# Patient Record
Sex: FEMALE | Race: WHITE | NOT HISPANIC OR LATINO | URBAN - METROPOLITAN AREA
[De-identification: names, ages, dates, MRNs, and addresses within clinical notes are randomized per-mention and may not be internally consistent; named-entity substitution may affect disease eponyms.]

---

## 2023-11-22 PROCEDURE — 96374 THER/PROPH/DIAG INJ IV PUSH: CPT

## 2023-11-22 PROCEDURE — 99285 EMERGENCY DEPT VISIT HI MDM: CPT | Performed by: EMERGENCY MEDICINE

## 2023-11-23 ENCOUNTER — HOSPITAL ENCOUNTER (INPATIENT)
Facility: HOSPITAL | Age: 78
LOS: 9 days | Discharge: HOME | DRG: 054 | End: 2023-12-02
Attending: EMERGENCY MEDICINE | Admitting: INTERNAL MEDICINE
Payer: COMMERCIAL

## 2023-11-23 ENCOUNTER — APPOINTMENT (OUTPATIENT)
Dept: RADIOLOGY | Facility: HOSPITAL | Age: 78
DRG: 054 | End: 2023-11-23
Payer: COMMERCIAL

## 2023-11-23 DIAGNOSIS — J42 CHRONIC BRONCHITIS, UNSPECIFIED CHRONIC BRONCHITIS TYPE (MULTI): ICD-10-CM

## 2023-11-23 DIAGNOSIS — J44.9 CHRONIC OBSTRUCTIVE PULMONARY DISEASE, UNSPECIFIED COPD TYPE (MULTI): ICD-10-CM

## 2023-11-23 DIAGNOSIS — R13.12 OROPHARYNGEAL DYSPHAGIA: Primary | ICD-10-CM

## 2023-11-23 DIAGNOSIS — K21.9 GASTROESOPHAGEAL REFLUX DISEASE, UNSPECIFIED WHETHER ESOPHAGITIS PRESENT: ICD-10-CM

## 2023-11-23 DIAGNOSIS — C79.9 METASTATIC ADENOCARCINOMA (MULTI): ICD-10-CM

## 2023-11-23 DIAGNOSIS — C79.31 METASTATIC CANCER TO BRAIN (MULTI): ICD-10-CM

## 2023-11-23 LAB
ALBUMIN SERPL BCP-MCNC: 3.9 G/DL (ref 3.4–5)
ALP SERPL-CCNC: 64 U/L (ref 33–136)
ALT SERPL W P-5'-P-CCNC: 19 U/L (ref 7–45)
ANION GAP BLDV CALCULATED.4IONS-SCNC: 11 MMOL/L (ref 10–25)
ANION GAP SERPL CALC-SCNC: 14 MMOL/L (ref 10–20)
AST SERPL W P-5'-P-CCNC: 19 U/L (ref 9–39)
BASE EXCESS BLDV CALC-SCNC: 3.4 MMOL/L (ref -2–3)
BASOPHILS # BLD AUTO: 0.01 X10*3/UL (ref 0–0.1)
BASOPHILS NFR BLD AUTO: 0.2 %
BILIRUB SERPL-MCNC: 0.5 MG/DL (ref 0–1.2)
BODY TEMPERATURE: 37 DEGREES CELSIUS
BUN SERPL-MCNC: 20 MG/DL (ref 6–23)
CA-I BLDV-SCNC: 1.19 MMOL/L (ref 1.1–1.33)
CALCIUM SERPL-MCNC: 9.5 MG/DL (ref 8.6–10.6)
CHLORIDE BLDV-SCNC: 103 MMOL/L (ref 98–107)
CHLORIDE SERPL-SCNC: 105 MMOL/L (ref 98–107)
CO2 SERPL-SCNC: 30 MMOL/L (ref 21–32)
CREAT SERPL-MCNC: 0.55 MG/DL (ref 0.5–1.05)
EOSINOPHIL # BLD AUTO: 0 X10*3/UL (ref 0–0.4)
EOSINOPHIL NFR BLD AUTO: 0 %
ERYTHROCYTE [DISTWIDTH] IN BLOOD BY AUTOMATED COUNT: 11.4 % (ref 11.5–14.5)
GFR SERPL CREATININE-BSD FRML MDRD: >90 ML/MIN/1.73M*2
GLUCOSE BLDV-MCNC: 133 MG/DL (ref 74–99)
GLUCOSE SERPL-MCNC: 127 MG/DL (ref 74–99)
HCO3 BLDV-SCNC: 29.6 MMOL/L (ref 22–26)
HCT VFR BLD AUTO: 38.5 % (ref 36–46)
HCT VFR BLD EST: 41 % (ref 36–46)
HGB BLD-MCNC: 12.9 G/DL (ref 12–16)
HGB BLDV-MCNC: 13.7 G/DL (ref 12–16)
IMM GRANULOCYTES # BLD AUTO: 0.02 X10*3/UL (ref 0–0.5)
IMM GRANULOCYTES NFR BLD AUTO: 0.5 % (ref 0–0.9)
INHALED O2 CONCENTRATION: 32 %
LACTATE BLDV-SCNC: 0.8 MMOL/L (ref 0.4–2)
LYMPHOCYTES # BLD AUTO: 0.38 X10*3/UL (ref 0.8–3)
LYMPHOCYTES NFR BLD AUTO: 9.2 %
MCH RBC QN AUTO: 30.7 PG (ref 26–34)
MCHC RBC AUTO-ENTMCNC: 33.5 G/DL (ref 32–36)
MCV RBC AUTO: 92 FL (ref 80–100)
MONOCYTES # BLD AUTO: 0.04 X10*3/UL (ref 0.05–0.8)
MONOCYTES NFR BLD AUTO: 1 %
NEUTROPHILS # BLD AUTO: 3.68 X10*3/UL (ref 1.6–5.5)
NEUTROPHILS NFR BLD AUTO: 89.1 %
NRBC BLD-RTO: 0 /100 WBCS (ref 0–0)
OXYHGB MFR BLDV: 95.1 % (ref 45–75)
PCO2 BLDV: 50 MM HG (ref 41–51)
PH BLDV: 7.38 PH (ref 7.33–7.43)
PLATELET # BLD AUTO: 181 X10*3/UL (ref 150–450)
PO2 BLDV: 83 MM HG (ref 35–45)
POTASSIUM BLDV-SCNC: 3.7 MMOL/L (ref 3.5–5.3)
POTASSIUM SERPL-SCNC: 3.7 MMOL/L (ref 3.5–5.3)
PROT SERPL-MCNC: 6.7 G/DL (ref 6.4–8.2)
RBC # BLD AUTO: 4.2 X10*6/UL (ref 4–5.2)
SAO2 % BLDV: 98 % (ref 45–75)
SODIUM BLDV-SCNC: 140 MMOL/L (ref 136–145)
SODIUM SERPL-SCNC: 145 MMOL/L (ref 136–145)
WBC # BLD AUTO: 4.1 X10*3/UL (ref 4.4–11.3)

## 2023-11-23 PROCEDURE — 72158 MRI LUMBAR SPINE W/O & W/DYE: CPT

## 2023-11-23 PROCEDURE — 72131 CT LUMBAR SPINE W/O DYE: CPT | Performed by: RADIOLOGY

## 2023-11-23 PROCEDURE — A9575 INJ GADOTERATE MEGLUMI 0.1ML: HCPCS | Performed by: EMERGENCY MEDICINE

## 2023-11-23 PROCEDURE — 2500000004 HC RX 250 GENERAL PHARMACY W/ HCPCS (ALT 636 FOR OP/ED): Mod: SE

## 2023-11-23 PROCEDURE — 72158 MRI LUMBAR SPINE W/O & W/DYE: CPT | Performed by: RADIOLOGY

## 2023-11-23 PROCEDURE — 85025 COMPLETE CBC W/AUTO DIFF WBC: CPT

## 2023-11-23 PROCEDURE — 72156 MRI NECK SPINE W/O & W/DYE: CPT

## 2023-11-23 PROCEDURE — 72125 CT NECK SPINE W/O DYE: CPT

## 2023-11-23 PROCEDURE — 99222 1ST HOSP IP/OBS MODERATE 55: CPT

## 2023-11-23 PROCEDURE — 72128 CT CHEST SPINE W/O DYE: CPT | Performed by: RADIOLOGY

## 2023-11-23 PROCEDURE — 82947 ASSAY GLUCOSE BLOOD QUANT: CPT

## 2023-11-23 PROCEDURE — 71260 CT THORAX DX C+: CPT

## 2023-11-23 PROCEDURE — 71260 CT THORAX DX C+: CPT | Performed by: STUDENT IN AN ORGANIZED HEALTH CARE EDUCATION/TRAINING PROGRAM

## 2023-11-23 PROCEDURE — 72141 MRI NECK SPINE W/O DYE: CPT

## 2023-11-23 PROCEDURE — 2550000001 HC RX 255 CONTRASTS: Performed by: EMERGENCY MEDICINE

## 2023-11-23 PROCEDURE — 1170000001 HC PRIVATE ONCOLOGY ROOM DAILY

## 2023-11-23 PROCEDURE — 36415 COLL VENOUS BLD VENIPUNCTURE: CPT

## 2023-11-23 PROCEDURE — 70553 MRI BRAIN STEM W/O & W/DYE: CPT

## 2023-11-23 PROCEDURE — 72125 CT NECK SPINE W/O DYE: CPT | Performed by: RADIOLOGY

## 2023-11-23 PROCEDURE — 72148 MRI LUMBAR SPINE W/O DYE: CPT

## 2023-11-23 PROCEDURE — 72156 MRI NECK SPINE W/O & W/DYE: CPT | Performed by: RADIOLOGY

## 2023-11-23 PROCEDURE — 72157 MRI CHEST SPINE W/O & W/DYE: CPT

## 2023-11-23 PROCEDURE — 72146 MRI CHEST SPINE W/O DYE: CPT

## 2023-11-23 PROCEDURE — 70551 MRI BRAIN STEM W/O DYE: CPT | Mod: 52

## 2023-11-23 PROCEDURE — 74177 CT ABD & PELVIS W/CONTRAST: CPT | Performed by: STUDENT IN AN ORGANIZED HEALTH CARE EDUCATION/TRAINING PROGRAM

## 2023-11-23 PROCEDURE — 72131 CT LUMBAR SPINE W/O DYE: CPT | Mod: RSC

## 2023-11-23 PROCEDURE — 2500000001 HC RX 250 WO HCPCS SELF ADMINISTERED DRUGS (ALT 637 FOR MEDICARE OP)

## 2023-11-23 PROCEDURE — 2550000001 HC RX 255 CONTRASTS: Mod: SE | Performed by: EMERGENCY MEDICINE

## 2023-11-23 PROCEDURE — 72157 MRI CHEST SPINE W/O & W/DYE: CPT | Performed by: RADIOLOGY

## 2023-11-23 PROCEDURE — 72128 CT CHEST SPINE W/O DYE: CPT | Mod: RSC

## 2023-11-23 PROCEDURE — 2500000004 HC RX 250 GENERAL PHARMACY W/ HCPCS (ALT 636 FOR OP/ED)

## 2023-11-23 RX ORDER — LORAZEPAM 1 MG/1
1 TABLET ORAL DAILY PRN
Status: DISCONTINUED | OUTPATIENT
Start: 2023-11-23 | End: 2023-12-02 | Stop reason: HOSPADM

## 2023-11-23 RX ORDER — ENOXAPARIN SODIUM 100 MG/ML
40 INJECTION SUBCUTANEOUS EVERY 24 HOURS
Status: DISCONTINUED | OUTPATIENT
Start: 2023-11-23 | End: 2023-11-28

## 2023-11-23 RX ORDER — PANTOPRAZOLE SODIUM 40 MG/1
40 TABLET, DELAYED RELEASE ORAL EVERY 12 HOURS
Status: DISCONTINUED | OUTPATIENT
Start: 2023-11-23 | End: 2023-11-24

## 2023-11-23 RX ORDER — GABAPENTIN 300 MG/1
300 CAPSULE ORAL 3 TIMES DAILY
Status: ON HOLD | COMMUNITY
End: 2023-11-23 | Stop reason: ALTCHOICE

## 2023-11-23 RX ORDER — LEVOTHYROXINE SODIUM 137 UG/1
137 TABLET ORAL
COMMUNITY
Start: 2023-11-08

## 2023-11-23 RX ORDER — TIZANIDINE 4 MG/1
4 TABLET ORAL EVERY 6 HOURS PRN
Status: DISCONTINUED | OUTPATIENT
Start: 2023-11-23 | End: 2023-11-23

## 2023-11-23 RX ORDER — DOCUSATE SODIUM 100 MG/1
100 CAPSULE, LIQUID FILLED ORAL AS NEEDED
COMMUNITY
Start: 2021-11-30

## 2023-11-23 RX ORDER — AMLODIPINE BESYLATE 5 MG/1
5 TABLET ORAL DAILY
COMMUNITY
Start: 2021-07-14

## 2023-11-23 RX ORDER — NAPROXEN SODIUM 220 MG/1
81 TABLET, FILM COATED ORAL DAILY
COMMUNITY
Start: 2021-11-30

## 2023-11-23 RX ORDER — OXYBUTYNIN CHLORIDE 5 MG/1
5 TABLET ORAL 2 TIMES DAILY
Status: DISCONTINUED | OUTPATIENT
Start: 2023-11-23 | End: 2023-12-02 | Stop reason: HOSPADM

## 2023-11-23 RX ORDER — AMLODIPINE BESYLATE 5 MG/1
5 TABLET ORAL DAILY
Status: DISCONTINUED | OUTPATIENT
Start: 2023-11-23 | End: 2023-11-27

## 2023-11-23 RX ORDER — LORAZEPAM 2 MG/ML
0.5 INJECTION INTRAMUSCULAR DAILY PRN
Status: DISCONTINUED | OUTPATIENT
Start: 2023-11-23 | End: 2023-11-29

## 2023-11-23 RX ORDER — ALBUTEROL SULFATE 0.83 MG/ML
2.5 SOLUTION RESPIRATORY (INHALATION) EVERY 6 HOURS PRN
Status: DISCONTINUED | OUTPATIENT
Start: 2023-11-23 | End: 2023-12-02 | Stop reason: HOSPADM

## 2023-11-23 RX ORDER — FESOTERODINE FUMARATE 4 MG/1
4 TABLET, FILM COATED, EXTENDED RELEASE ORAL DAILY
COMMUNITY
Start: 2020-09-09

## 2023-11-23 RX ORDER — LEVOTHYROXINE SODIUM 137 UG/1
137 TABLET ORAL
Status: DISCONTINUED | OUTPATIENT
Start: 2023-11-24 | End: 2023-11-27

## 2023-11-23 RX ORDER — ERGOCALCIFEROL (VITAMIN D2) 50 MCG
50 CAPSULE ORAL DAILY
Status: ON HOLD | COMMUNITY
Start: 2022-12-23 | End: 2023-11-23 | Stop reason: ALTCHOICE

## 2023-11-23 RX ORDER — LOSARTAN POTASSIUM 50 MG/1
50 TABLET ORAL DAILY
Status: DISCONTINUED | OUTPATIENT
Start: 2023-11-23 | End: 2023-11-27

## 2023-11-23 RX ORDER — HYDROCODONE BITARTRATE AND ACETAMINOPHEN 10; 325 MG/1; MG/1
1 TABLET ORAL EVERY 4 HOURS PRN
COMMUNITY
Start: 2023-11-07

## 2023-11-23 RX ORDER — IPRATROPIUM BROMIDE 21 UG/1
2 SPRAY, METERED NASAL 3 TIMES DAILY PRN
Status: DISCONTINUED | OUTPATIENT
Start: 2023-11-23 | End: 2023-12-02 | Stop reason: HOSPADM

## 2023-11-23 RX ORDER — DOCUSATE SODIUM 100 MG/1
100 CAPSULE, LIQUID FILLED ORAL AS NEEDED
Status: DISCONTINUED | OUTPATIENT
Start: 2023-11-23 | End: 2023-12-02 | Stop reason: HOSPADM

## 2023-11-23 RX ORDER — CYCLOBENZAPRINE HCL 10 MG
10 TABLET ORAL 3 TIMES DAILY PRN
Status: DISCONTINUED | OUTPATIENT
Start: 2023-11-23 | End: 2023-12-02 | Stop reason: HOSPADM

## 2023-11-23 RX ORDER — TIZANIDINE 4 MG/1
4 TABLET ORAL EVERY 6 HOURS PRN
Status: ON HOLD | COMMUNITY
Start: 2021-07-20 | End: 2023-11-23 | Stop reason: ALTCHOICE

## 2023-11-23 RX ORDER — LORAZEPAM 2 MG/ML
1 INJECTION INTRAMUSCULAR ONCE
Status: COMPLETED | OUTPATIENT
Start: 2023-11-23 | End: 2023-11-23

## 2023-11-23 RX ORDER — LEVETIRACETAM 5 MG/ML
500 INJECTION INTRAVASCULAR EVERY 12 HOURS
Status: DISCONTINUED | OUTPATIENT
Start: 2023-11-23 | End: 2023-12-01

## 2023-11-23 RX ORDER — ALBUTEROL SULFATE 90 UG/1
2 AEROSOL, METERED RESPIRATORY (INHALATION) EVERY 6 HOURS PRN
COMMUNITY

## 2023-11-23 RX ORDER — DEXAMETHASONE 4 MG/1
4 TABLET ORAL EVERY 6 HOURS
Status: DISCONTINUED | OUTPATIENT
Start: 2023-11-23 | End: 2023-11-23

## 2023-11-23 RX ORDER — GABAPENTIN 300 MG/1
300 CAPSULE ORAL 3 TIMES DAILY
Status: DISCONTINUED | OUTPATIENT
Start: 2023-11-23 | End: 2023-11-26

## 2023-11-23 RX ORDER — PANTOPRAZOLE SODIUM 40 MG/1
40 TABLET, DELAYED RELEASE ORAL EVERY 12 HOURS PRN
COMMUNITY
Start: 2022-12-23

## 2023-11-23 RX ORDER — POLYETHYLENE GLYCOL 3350 17 G/17G
17 POWDER, FOR SOLUTION ORAL DAILY PRN
Status: DISCONTINUED | OUTPATIENT
Start: 2023-11-23 | End: 2023-11-23

## 2023-11-23 RX ORDER — NAPROXEN SODIUM 220 MG/1
81 TABLET, FILM COATED ORAL DAILY
Status: DISCONTINUED | OUTPATIENT
Start: 2023-11-23 | End: 2023-12-02 | Stop reason: HOSPADM

## 2023-11-23 RX ORDER — ATORVASTATIN CALCIUM 10 MG/1
10 TABLET, FILM COATED ORAL NIGHTLY
Status: DISCONTINUED | OUTPATIENT
Start: 2023-11-23 | End: 2023-12-02 | Stop reason: HOSPADM

## 2023-11-23 RX ORDER — POLYETHYLENE GLYCOL 3350 17 G/17G
17 POWDER, FOR SOLUTION ORAL DAILY
Status: DISCONTINUED | OUTPATIENT
Start: 2023-11-23 | End: 2023-11-23

## 2023-11-23 RX ORDER — DEXAMETHASONE SODIUM PHOSPHATE 4 MG/ML
4 INJECTION, SOLUTION INTRA-ARTICULAR; INTRALESIONAL; INTRAMUSCULAR; INTRAVENOUS; SOFT TISSUE EVERY 6 HOURS
Status: DISCONTINUED | OUTPATIENT
Start: 2023-11-23 | End: 2023-12-01

## 2023-11-23 RX ORDER — LEVETIRACETAM 500 MG/1
500 TABLET ORAL 2 TIMES DAILY
Status: DISCONTINUED | OUTPATIENT
Start: 2023-11-23 | End: 2023-11-23

## 2023-11-23 RX ORDER — IPRATROPIUM BROMIDE 21 UG/1
2 SPRAY, METERED NASAL 3 TIMES DAILY PRN
COMMUNITY
Start: 2023-08-14

## 2023-11-23 RX ORDER — CYCLOBENZAPRINE HCL 10 MG
10 TABLET ORAL 3 TIMES DAILY PRN
Status: ON HOLD | COMMUNITY
End: 2023-11-23 | Stop reason: ALTCHOICE

## 2023-11-23 RX ORDER — LORAZEPAM 0.5 MG/1
2 TABLET ORAL ONCE
Status: COMPLETED | OUTPATIENT
Start: 2023-11-23 | End: 2023-11-23

## 2023-11-23 RX ORDER — ONDANSETRON HYDROCHLORIDE 8 MG/1
8 TABLET, FILM COATED ORAL EVERY 8 HOURS PRN
COMMUNITY

## 2023-11-23 RX ORDER — IRBESARTAN 150 MG/1
150 TABLET ORAL DAILY
COMMUNITY
Start: 2021-07-19

## 2023-11-23 RX ORDER — ALBUTEROL SULFATE 0.83 MG/ML
2.5 SOLUTION RESPIRATORY (INHALATION) EVERY 6 HOURS PRN
Qty: 75 ML | Refills: 11
Start: 2023-11-23 | End: 2024-11-22

## 2023-11-23 RX ORDER — HYDROCODONE BITARTRATE AND ACETAMINOPHEN 5; 325 MG/1; MG/1
2 TABLET ORAL EVERY 4 HOURS PRN
Status: DISCONTINUED | OUTPATIENT
Start: 2023-11-23 | End: 2023-12-02 | Stop reason: HOSPADM

## 2023-11-23 RX ORDER — LORAZEPAM 1 MG/1
1 TABLET ORAL DAILY PRN
COMMUNITY
Start: 2009-08-17 | End: 2024-05-05

## 2023-11-23 RX ORDER — CHOLECALCIFEROL (VITAMIN D3) 25 MCG
50 TABLET ORAL DAILY
Status: DISCONTINUED | OUTPATIENT
Start: 2023-11-23 | End: 2023-12-02 | Stop reason: HOSPADM

## 2023-11-23 RX ORDER — GADOTERATE MEGLUMINE 376.9 MG/ML
12 INJECTION INTRAVENOUS
Status: COMPLETED | OUTPATIENT
Start: 2023-11-23 | End: 2023-11-23

## 2023-11-23 RX ORDER — CHOLECALCIFEROL (VITAMIN D3) 25 MCG
2000 TABLET ORAL DAILY
COMMUNITY

## 2023-11-23 RX ORDER — ATORVASTATIN CALCIUM 10 MG/1
10 TABLET, FILM COATED ORAL DAILY
COMMUNITY
Start: 2021-07-19

## 2023-11-23 RX ADMIN — DEXAMETHASONE 4 MG: 2 TABLET ORAL at 12:21

## 2023-11-23 RX ADMIN — DEXAMETHASONE SODIUM PHOSPHATE 4 MG: 4 INJECTION, SOLUTION INTRAMUSCULAR; INTRAVENOUS at 17:20

## 2023-11-23 RX ADMIN — IOHEXOL 90 ML: 350 INJECTION, SOLUTION INTRAVENOUS at 07:58

## 2023-11-23 RX ADMIN — LEVETIRACETAM 500 MG: 5 INJECTION INTRAVENOUS at 21:54

## 2023-11-23 RX ADMIN — LORAZEPAM 1 MG: 2 INJECTION INTRAMUSCULAR; INTRAVENOUS at 03:20

## 2023-11-23 RX ADMIN — LORAZEPAM 2 MG: 0.5 TABLET ORAL at 12:53

## 2023-11-23 RX ADMIN — GADOTERATE MEGLUMINE 12 ML: 376.9 INJECTION INTRAVENOUS at 14:56

## 2023-11-23 RX ADMIN — DEXAMETHASONE SODIUM PHOSPHATE 4 MG: 4 INJECTION, SOLUTION INTRAMUSCULAR; INTRAVENOUS at 22:04

## 2023-11-23 RX ADMIN — LEVETIRACETAM 500 MG: 500 TABLET, FILM COATED ORAL at 12:21

## 2023-11-23 SDOH — SOCIAL STABILITY: SOCIAL INSECURITY: WERE YOU ABLE TO COMPLETE ALL THE BEHAVIORAL HEALTH SCREENINGS?: NO

## 2023-11-23 ASSESSMENT — COGNITIVE AND FUNCTIONAL STATUS - GENERAL
MOVING TO AND FROM BED TO CHAIR: TOTAL
DAILY ACTIVITIY SCORE: 6
PERSONAL GROOMING: TOTAL
HELP NEEDED FOR BATHING: TOTAL
DRESSING REGULAR LOWER BODY CLOTHING: TOTAL
WALKING IN HOSPITAL ROOM: TOTAL
TOILETING: TOTAL
MOVING FROM LYING ON BACK TO SITTING ON SIDE OF FLAT BED WITH BEDRAILS: TOTAL
EATING MEALS: TOTAL
PATIENT BASELINE BEDBOUND: NO
CLIMB 3 TO 5 STEPS WITH RAILING: TOTAL
TURNING FROM BACK TO SIDE WHILE IN FLAT BAD: TOTAL
DRESSING REGULAR UPPER BODY CLOTHING: TOTAL
STANDING UP FROM CHAIR USING ARMS: TOTAL
MOBILITY SCORE: 6

## 2023-11-23 ASSESSMENT — LIFESTYLE VARIABLES
HOW OFTEN DO YOU HAVE 6 OR MORE DRINKS ON ONE OCCASION: NEVER
EVER FELT BAD OR GUILTY ABOUT YOUR DRINKING: NO
HOW MANY STANDARD DRINKS CONTAINING ALCOHOL DO YOU HAVE ON A TYPICAL DAY: PATIENT DOES NOT DRINK
HAVE PEOPLE ANNOYED YOU BY CRITICIZING YOUR DRINKING: NO
AUDIT-C TOTAL SCORE: 0
HOW OFTEN DO YOU HAVE A DRINK CONTAINING ALCOHOL: NEVER
REASON UNABLE TO ASSESS: NO
SKIP TO QUESTIONS 9-10: 1
EVER HAD A DRINK FIRST THING IN THE MORNING TO STEADY YOUR NERVES TO GET RID OF A HANGOVER: NO
HAVE YOU EVER FELT YOU SHOULD CUT DOWN ON YOUR DRINKING: NO
AUDIT-C TOTAL SCORE: 0

## 2023-11-23 ASSESSMENT — ENCOUNTER SYMPTOMS
WEAKNESS: 1
ABDOMINAL DISTENTION: 0
FATIGUE: 0
NAUSEA: 0
ACTIVITY CHANGE: 1
FEVER: 0
CONFUSION: 1
CHEST TIGHTNESS: 0
SHORTNESS OF BREATH: 0
DIARRHEA: 0
COUGH: 0
FACIAL SWELLING: 0
DIFFICULTY URINATING: 0
CONFUSION: 0
ARTHRALGIAS: 0
CHILLS: 0
COLOR CHANGE: 0
ABDOMINAL PAIN: 0
HEADACHES: 0

## 2023-11-23 ASSESSMENT — PAIN SCALES - GENERAL
PAINLEVEL_OUTOF10: 0 - NO PAIN
PAINLEVEL_OUTOF10: 0 - NO PAIN
PAINLEVEL_OUTOF10: 8
PAINLEVEL_OUTOF10: 0 - NO PAIN

## 2023-11-23 ASSESSMENT — ACTIVITIES OF DAILY LIVING (ADL)
ADEQUATE_TO_COMPLETE_ADL: YES
JUDGMENT_ADEQUATE_SAFELY_COMPLETE_DAILY_ACTIVITIES: NO
PATIENT'S MEMORY ADEQUATE TO SAFELY COMPLETE DAILY ACTIVITIES?: NO
BATHING: NEEDS ASSISTANCE
TOILETING: NEEDS ASSISTANCE
HEARING - LEFT EAR: FUNCTIONAL
WALKS IN HOME: NEEDS ASSISTANCE
HEARING - RIGHT EAR: FUNCTIONAL
FEEDING YOURSELF: NEEDS ASSISTANCE
DRESSING YOURSELF: NEEDS ASSISTANCE
GROOMING: NEEDS ASSISTANCE
ADEQUATE_TO_COMPLETE_ADL: YES

## 2023-11-23 ASSESSMENT — COLUMBIA-SUICIDE SEVERITY RATING SCALE - C-SSRS
2. HAVE YOU ACTUALLY HAD ANY THOUGHTS OF KILLING YOURSELF?: NO
1. IN THE PAST MONTH, HAVE YOU WISHED YOU WERE DEAD OR WISHED YOU COULD GO TO SLEEP AND NOT WAKE UP?: NO
6. HAVE YOU EVER DONE ANYTHING, STARTED TO DO ANYTHING, OR PREPARED TO DO ANYTHING TO END YOUR LIFE?: NO

## 2023-11-23 ASSESSMENT — PAIN DESCRIPTION - LOCATION: LOCATION: BACK

## 2023-11-23 ASSESSMENT — PAIN - FUNCTIONAL ASSESSMENT
PAIN_FUNCTIONAL_ASSESSMENT: 0-10

## 2023-11-23 ASSESSMENT — PAIN DESCRIPTION - PAIN TYPE: TYPE: CHRONIC PAIN

## 2023-11-23 NOTE — PROGRESS NOTES
Patient was handed off to me from the previous team. For full history, physical, and prior ED course, please see previous provider note prior to patient handoff. This is an addendum to the record.    HPI/prior hospital course:   In brief, patient is a 78-year-old female with past medical history of lung adenocarcinoma who presented with bilateral weakness, gait difficulties and speech difficulties for 10 days.  Outside imaging concerning for multiple brain metastases with vasogenic edema.  Patient transferred for neurosurgical evaluation.  Patient handed off pending results of MRIs of brain and C/T/L-spine.  Plan at time of handoff was admission to medicine with oncology consult if no neurosurgical intervention required.    Hospital Course/MDM:  MRI brain's with confirmed evidence of metastatic disease in the brain no evidence of spinal cord metastases.  Patient unable to tolerate MRI with contrast due to anxiety.  Benito scan with contrast ordered with plan for spot doses of benzodiazepines to facilitate scan but pending at time of admission.  Case discussed with neurosurgery who recommended dexamethasone 4 mg every 6, Keppra 500 mg twice daily and medicine admission irrespective of results of MRI with contrast.  Case was discussed with medicine and patient admitted to medicine for facilitation of further workup.  Neurosurgery felt strongly that patient needed inpatient monitoring of neurological symptoms of months and needed expedited care that could only be facilitated with admission.    Disposition:  Admitted to medicine    Patient seen and discussed with Dr. Destinee Montoya MD, PhD  Emergency Medicine PGY2

## 2023-11-23 NOTE — SIGNIFICANT EVENT
Transfer from [facility].  78-year-old female with history of lung adenocarcinoma presenting with weakness and gait instability.  CT head found multiple frontal lobe masses with edema, given Dex.  Accepted by neurosurgery for ED to ED transfer for further evaluation.

## 2023-11-23 NOTE — CONSULTS
Consults    Date of Service:  11/23/2023 Attending Provider:  Jonathan Rodriguez MD     Reason for Consultation:  Valorie Montelongo is being seen today for a consult requested by Jonathan Rodriguez MD for brain mets.      Assessment/Plan   Assessment:  77 y/o F w h/o HTN HLD NSTEMI 2/2 pneumothorax (11/25/21), lung adenocarinoma s/p L VATS, RAYRAY and LLL wedge resection (11/18/21), COPD (on 2L), oral cancer s/p resection and rads, GERD, papillary thyroid cancer s/p thyroidectomy 7/2021, hypothyroidism OAB, chronic pain syndrome, anxiety p/w weakness and gait instability, CTH LF mass, 11/23 MRI neuroaxis EF 1.5x1.5cm, LF 1x1cm, and 2x2cm lesions, R parietal 1x1cm lesion    Plan:  Recommend med onc admission  Please obtain volumetric MRI brain with contrast  Recommend discussion at tumor board  Recommend dex 4q6 and keppra 500BID  Please document RCRI, prognosis   Due to cardiorespiratory status, complex medical/oncologic history, and multiple lesions <3cm recommend gamma knife and no acute neurosurgical intervention.     Plan was discussed with chief resident and attending Dr. Cleveland, Dr. Olivares. Plan not finalized until note signed by attending    Subjective   History of Present Illness:  77 y/o F w h/o HTN HLD NSTEMI 2/2 pneumothorax (11/25/21), lung adenocarinoma s/p L VATS, RAYRAY and LLL wedge resection (11/18/21), COPD (on 2L), oral cancer s/p resection and rads, GERD, papillary thyroid cancer s/p thyroidectomy 7/2021, hypothyroidism OAB, chronic pain syndrome, anxiety p/w weakness and gait instability, CTH LF mass, 11/23 MRI neuroaxis EF 1.5x1.5cm, LF 1x1cm, and 2x2cm lesions, R parietal 1x1cm lesion.    Review of Systems   Constitutional:  Negative for fever.   HENT:  Negative for facial swelling.    Eyes:  Negative for visual disturbance.   Respiratory:  Negative for cough.    Cardiovascular:  Negative for leg swelling.   Gastrointestinal:  Negative for abdominal distention.   Genitourinary:  Negative for difficulty  urinating.   Musculoskeletal:  Positive for gait problem.   Neurological:  Negative for headaches.   Psychiatric/Behavioral:  Positive for confusion.         Objective   Vitals:  Vitals:    11/23/23 0034   BP: 155/86   Pulse: 85   Resp: 20   Temp: 36.9 °C (98.5 °F)   SpO2: 98%       Exam:  Constitutional: No acute distress  Resp: breathing comfortably  Cardio: well perfused  GI: nondistended  MSK: full range of motion  Neuro: Awake, Ox3,  R D4, B/T 5, HG/IO 4, R drift, RLE 5/5 clonus  L 5/5, no clonus  Psych: appropriate  Skin: no obvious lesions    Medical History  No past medical history on file.    Surgical History  Past Surgical History:   Procedure Laterality Date    CT HEAD ANGIO W AND WO IV CONTRAST  4/12/2019    CT HEAD ANGIO W AND WO IV CONTRAST 4/12/2019 [facility]    OTHER SURGICAL HISTORY  07/21/2021    Hysterectomy    OTHER SURGICAL HISTORY  11/02/2021    Cholecystectomy    OTHER SURGICAL HISTORY  11/02/2021    Cataract surgery    OTHER SURGICAL HISTORY  12/03/2021    Lung surgery    OTHER SURGICAL HISTORY  12/09/2021    Pleural biopsy    OTHER SURGICAL HISTORY  12/09/2021    Lung wedge resection    OTHER SURGICAL HISTORY  09/28/2021    Thyroidectomy total    OTHER SURGICAL HISTORY  09/29/2021    Percutaneous endoscopic gastrostomy tube removal    OTHER SURGICAL HISTORY  09/29/2021    Percutaneous endoscopic gastrostomy tube insertion        Medications  No current outpatient medications     Diagnostic Results:    Lab Results   Component Value Date    WBC 10.5 11/30/2021    HGB 9.8 (L) 11/30/2021    HCT 30.5 (L) 11/30/2021    MCV 96 11/30/2021     11/30/2021     Lab Results   Component Value Date    CREATININE 0.79 11/30/2021    BUN 10 11/30/2021     11/30/2021    K 3.8 11/30/2021     11/30/2021    CO2 26 11/30/2021     Lab Results   Component Value Date    INR 1.1 11/26/2021    INR 1.1 11/20/2021    INR 1.0 11/19/2021    PROTIME 12.8 11/26/2021    PROTIME 13.0 11/20/2021     PROTIME 12.1 11/19/2021     Miguelito Dozier MD

## 2023-11-23 NOTE — ED TRIAGE NOTES
Presented to [facility] ED from home for new onset B/L LE weakness. CT showed 2.5 CM mass. Transferred to [facility] for neuro eval

## 2023-11-23 NOTE — PROGRESS NOTES
Pharmacy Medication History Review    Valorie Montelongo is a 78 y.o. female admitted for Metastatic adenocarcinoma (CMS/HCC). Pharmacy reviewed the patient's fsykc-hb-rbbfiofil medications and allergies for accuracy.    The list below reflects the updated PTA list. Comments regarding how patient may be taking medications differently can be found in the Admit Orders Activity  Prior to Admission Medications   Prescriptions Last Dose Informant Patient Reported? Taking?   HYDROcodone-acetaminophen (Norco)  mg tablet   Yes Yes   Sig: Take 1 tablet by mouth every 4 hours if needed for moderate pain (4 - 6).   LORazepam (Ativan) 1 mg tablet   Yes Yes   Sig: Take 1 tablet (1 mg) by mouth once daily as needed for anxiety.   albuterol 90 mcg/actuation inhaler   Yes Yes   Sig: Inhale 2 puffs every 6 hours if needed for wheezing or shortness of breath.   amLODIPine (Norvasc) 5 mg tablet not taking  Yes Yes   Sig: Take 1 tablet (5 mg) by mouth once daily.   aspirin 81 mg chewable tablet not taking  Yes Yes   Sig: Chew 1 tablet (81 mg) once daily.   atorvastatin (Lipitor) 10 mg tablet   Yes Yes   Sig: Take 1 tablet (10 mg) by mouth once daily.   cholecalciferol (Vitamin D-3) 25 MCG (1000 UT) tablet   Yes Yes   Sig: Take 2 tablets (2,000 Units) by mouth once daily.   docusate sodium (Colace) 100 mg capsule   Yes Yes   Sig: Take 1 capsule (100 mg) by mouth if needed for constipation.   fesoterodine (Toviaz) 4 mg tablet extended release 24 hr   Yes Yes   Sig: Take 1 tablet (4 mg) by mouth once daily.   ipratropium (Atrovent) 21 mcg (0.03 %) nasal spray   Yes Yes   Sig: Administer 2 sprays into each nostril 3 times a day as needed for rhinitis.   irbesartan (Avapro) 150 mg tablet   Yes Yes   Sig: Take 1 tablet (150 mg) by mouth once daily.   levothyroxine (Synthroid, Levoxyl) 137 mcg tablet   Yes Yes   Sig: Take 1 tablet (137 mcg) by mouth once daily in the morning. Take before meals.   ondansetron (Zofran) 8 mg tablet   Yes Yes    Sig: Take 1 tablet (8 mg) by mouth every 8 hours if needed for nausea or vomiting.   pantoprazole (Protonix) 40 mg EC tablet   Yes Yes   Sig: Take 1 tablet (40 mg) by mouth every 12 hours if needed (acid reflux).   vit A/vit C/vit E/zinc/copper (ICAPS AREDS ORAL)   Yes Yes   Sig: Take 1 capsule by mouth once daily.      Facility-Administered Medications: None        The list below reflects the updated allergy list. Please review each documented allergy for additional clarification and justification.  Allergies  Reviewed by Felicia Fields PharmD on 11/23/2023        Severity Reactions Comments    Fluoxetine Not Specified Insomnia     Levofloxacin Low Itching, Rash             Patient declines M2B at discharge. Pharmacy has been updated to [facility].    Sources used: Pharmacy dispense history, OARRs, patient interview (unable to arouse- called spouse, José Manuel, who had medications available), and [facility] note from 11/8    Below are additional concerns with the patient's PTA list.  -spouse mentions that pain medications have been on backorder therefore hasn't had for a while. Last dispensed 10/18 for 25 day supply  -Takes Atrovent nasal spray as needed  -Was told to stop taking ibuprofen 800 mg but still has prescription at home    Felicia Fields PharmD, [facility]  Transitions of Care Pharmacist  [facility]  Please reach out via Secure Chat for questions, or if no response call [employer] or vocera [facility]

## 2023-11-23 NOTE — ED PROVIDER NOTES
HPI   Chief Complaint   Patient presents with    Extremity Weakness       Patient is a 78-year-old female with extensive history of lung adenocarcinoma presenting as a transfer from outside hospital to be evaluated by neurosurgery.  Patient joanne Grayson presented for evaluation of weakness, gait difficulty and intermittent speech difficulties x 10 days.  Patient had imaging done at outside hospital, including a CT of the brain which demonstrates multiple masses with vasogenic edema.  Neurosurgery was consulted who recommended transfer for intervention and management.  Patient arrived hemodynamically stable.  Received IV Decadron prior to transport.  Patient has an NIH of 0 here.  Denies fever, chills, chest pain or shortness of breath, nausea, vomiting or diarrhea.  No abdominal pain.  Reports that she is not currently on treatment for lung cancer and has not been on treatment for several months.                          [place] Coma Scale Score: 14                  Patient History   History reviewed. No pertinent past medical history.  Past Surgical History:   Procedure Laterality Date    CT HEAD ANGIO W AND WO IV CONTRAST  4/12/2019    CT HEAD ANGIO W AND WO IV CONTRAST 4/12/2019 [facility]    OTHER SURGICAL HISTORY  07/21/2021    Hysterectomy    OTHER SURGICAL HISTORY  11/02/2021    Cholecystectomy    OTHER SURGICAL HISTORY  11/02/2021    Cataract surgery    OTHER SURGICAL HISTORY  12/03/2021    Lung surgery    OTHER SURGICAL HISTORY  12/09/2021    Pleural biopsy    OTHER SURGICAL HISTORY  12/09/2021    Lung wedge resection    OTHER SURGICAL HISTORY  09/28/2021    Thyroidectomy total    OTHER SURGICAL HISTORY  09/29/2021    Percutaneous endoscopic gastrostomy tube removal    OTHER SURGICAL HISTORY  09/29/2021    Percutaneous endoscopic gastrostomy tube insertion     No family history on file.  Social History     Tobacco Use    Smoking status: Not on file    Smokeless tobacco: Not on file   Substance  Use Topics    Alcohol use: Not on file    Drug use: Not on file       Physical Exam   ED Triage Vitals [11/23/23 0034]   Temp Heart Rate Resp BP   36.9 °C (98.5 °F) 85 20 155/86      SpO2 Temp Source Heart Rate Source Patient Position   98 % Oral Monitor Lying      BP Location FiO2 (%)     Left arm --       Physical Exam  Vitals and nursing note reviewed.   Constitutional:       General: She is not in acute distress.     Appearance: Normal appearance. She is not toxic-appearing.   HENT:      Head: Normocephalic.      Mouth/Throat:      Mouth: Mucous membranes are moist.   Eyes:      General: No visual field deficit.     Conjunctiva/sclera: Conjunctivae normal.      Pupils: Pupils are equal, round, and reactive to light.   Cardiovascular:      Rate and Rhythm: Normal rate and regular rhythm.      Pulses:           Radial pulses are 2+ on the right side and 2+ on the left side.   Pulmonary:      Effort: Pulmonary effort is normal. No respiratory distress.      Breath sounds: Normal breath sounds.   Abdominal:      General: Abdomen is flat.      Palpations: Abdomen is soft.      Tenderness: There is no abdominal tenderness. There is no guarding or rebound.   Musculoskeletal:      Cervical back: Normal range of motion and neck supple.      Right lower leg: No edema.      Left lower leg: No edema.   Skin:     General: Skin is warm.   Neurological:      Mental Status: She is alert and oriented to person, place, and time.      GCS: GCS eye subscore is 4. GCS verbal subscore is 5. GCS motor subscore is 6.      Cranial Nerves: Dysarthria present. No cranial nerve deficit or facial asymmetry.      Sensory: No sensory deficit.      Motor: Weakness and tremor present.      Coordination: Romberg sign negative. Finger-Nose-Finger Test normal. Impaired rapid alternating movements.   Psychiatric:         Mood and Affect: Mood normal.         ED Course & MDM   ED Course as of 11/26/23 1604   Thu Nov 23, 2023   1031 MR brain w and wo  IV contrast [RG]   1031 MR brain wo IV contrast [RG]      ED Course User Index  [RG] Gino Felipe MD         Diagnoses as of 11/26/23 1604   Metastatic adenocarcinoma (CMS/HCC)       Medical Decision Making  78-year-old female presenting to the emergency department with new onset weakness to be evaluated by neurosurgery.  On physical exam, patient is hemodynamically stable and in no acute distress.  Neurosurgery consulted.  Patient has an NIH of 0 on my evaluation.  Neurosurgery recommended additional imaging including CTs of the spine in addition to MRI of the head, CT and L-spine.  Given patient has concerning signs for new mets to the brain and possibly spinal cord, patient warrants admission to  medical oncology to discuss initiating treatment with neurosurgery following.  At time of signout, patient was pending MRI results and final disposition.  Please refer to incoming resident note for further hospital course.  Patient signed out in stable condition.        Procedure  Procedures     Eliza Fermin DO  Resident  11/26/23 1602

## 2023-11-23 NOTE — CONSULTS
Speech-Language Pathology                 Therapy Communication Note    Patient Name: Valorie Montelongo  MRN: [id number]  Today's Date: 11/23/2023     Discipline: Speech Language Pathology    Missed Visit Reason:  Pt in the ED at the time of the order. Pt in process of being transported for procedure and then to pt room.    Missed Time: Attempt    Comment: SLP will re-attempt as soon as able.

## 2023-11-23 NOTE — H&P
History Of Present Illness  Valorie Montelongo is a 79 y/o F w h/o metastatic lung cancer (s/p VATS RAYRAY and LLL resection at  11/18/21 but now follows with oncology at [facility]), h/o papillary thyroid cancer (s/p thyroidectomy 7/2021), oral cancer (s/p resection and XRT), HTN, HLD, NSTEMI 2/2 pneumothorax (11/25/21), COPD (on 2L), GERD, and anxiety who presents as a transfer from [facility] for neurosurgery evaluation as CT head at OSH showed new metastatic brain lesions. Interview conducted with patient as well as  at bed side. Per  patient first noticed a neuro cognitive decline about 10 days prior to admission. He noticed a change in her gait as well as her mixing up words as well as writing. He conducted her outpatient PCP as well as oncologist who recommended ED evaluation due to Thanksgiving holiday. Patient denies any recent falls or trauma to the head. She also denies any headaches, blurry vision, diplopia, changes to her senses, F/C, shortness of breath, chest pain, abdominal pain, N/V/D.      ED course:   T-36.9  HR- 75  RR- 30  BP-127/73  SPO2- 99% on RA     Labs: Glucose- 127, Na-145, WBC-4.1, Hgb-12.9, plat- 181     CT whole spine without IV contrast: No CT evidence of metastatic disease in the spine, degernative changes and scoliosis noted     MRI whole spine without contrast: no lesions noted within the cervical, thoracic or lumbar spine, no significant spinal canal or neural foraminal stenosis     MRI brain without IV contrast: numerus cerebral lesions scattered throughout the b/l cerebral hemispheres with significant sorrouning vasogenic edema, components of hemorrhage - however exam is somewhat limited due to non-contrast examination     MRI brain with contrast attempted however patient unable to tolerate due to anxiety. Will plan to re-attempt 11/23 PM with ativan pre-medication     Neurosurgery consulted in the ED: recommend dexamethasone 4mg q6 hours and keppra 500mg BID  and recommend gamma knife radiation and no acute surgical intervention due to complex medical / oncological history and multiple lesions <3cm     Past Medical History  metastatic lung cancer (s/p VATS RAYRAY and LLL resection at  11/18/21 but now follows with oncology at [facility]), h/o papillary thyroid cancer (s/p thyroidectomy 7/2021), oral cancer (s/p resection and XRT), HTN, HLD, NSTEMI 2/2 pneumothorax (11/25/21), COPD (on 2L), GERD, and anxiety     Surgical History  She has a past surgical history that includes Other surgical history (07/21/2021); Other surgical history (11/02/2021); Other surgical history (11/02/2021); Other surgical history (12/03/2021); Other surgical history (12/09/2021); Other surgical history (12/09/2021); Other surgical history (09/28/2021); Other surgical history (09/29/2021); Other surgical history (09/29/2021); and CT angio head w and wo IV contrast (4/12/2019).    Oncology History    No history exists.     metastatic lung cancer (s/p VATS RAYRAY and LLL resection at  11/18/21 but now follows with oncology at [facility]), h/o papillary thyroid cancer (s/p thyroidectomy 7/2021), oral cancer (s/p resection and XRT)    Patient follows with [facility], primary oncologist Dr. David Mathias -- emailed on admission about findings and for further oncologic history and records as community records via EPIC do not provide details     Social History  Former smoker, quite 6/2021   Denies ETOH or illicit drug use now or ever     Allergies  Fluoxetine and Levofloxacin    Review of Systems   Constitutional:  Positive for activity change. Negative for chills and fatigue.   HENT:  Negative for congestion.    Respiratory:  Negative for cough, chest tightness and shortness of breath.    Cardiovascular:  Negative for chest pain.   Gastrointestinal:  Negative for abdominal pain, diarrhea and nausea.   Musculoskeletal:  Negative for arthralgias.   Skin:  Negative for color change.   Neurological:  Positive for weakness.         Gait changes   Psychiatric/Behavioral:  Negative for confusion.         Physical Exam     Last Recorded Vitals  Blood pressure 154/73, pulse 64, temperature 36.9 °C (98.5 °F), temperature source Axillary, resp. rate 23, SpO2 99 %.    Relevant Results        Scheduled medications  amLODIPine, 5 mg, oral, Daily  aspirin, 81 mg, oral, Daily  atorvastatin, 10 mg, oral, Nightly  cholecalciferol, 50 mcg, oral, Daily  dexAMETHasone, 4 mg, oral, q6h  [Held by provider] enoxaparin, 40 mg, subcutaneous, q24h  gabapentin, 300 mg, oral, TID  levETIRAcetam, 500 mg, oral, BID  [START ON 11/24/2023] levothyroxine, 137 mcg, oral, Daily before breakfast  losartan, 50 mg, oral, Daily  oxybutynin, 5 mg, oral, BID  pantoprazole, 40 mg, oral, q12h  polyethylene glycol, 17 g, oral, Daily      CT chest abdomen pelvis w IV contrast    Result Date: 11/23/2023  Interpreted By:  Lance Jose,  and Jesse Lucas STUDY: CT CHEST ABDOMEN PELVIS W IV CONTRAST;  11/23/2023 8:02 am   INDICATION: Signs/Symptoms:concern for mets with new acute weakness. History of papillary thyroid cancer involving trachea status post debulking surgery 7/2021 with positive margins and biopsy confirmed adenocarcinoma of the left lung in 2021 status post left upper lobe wedge resection November 2021.   COMPARISON: PET-CT dated 11/16/2021, CT chest 11/19/2021, MRI brain 11/23/2023   ACCESSION NUMBER(S): [id number]   ORDERING CLINICIAN: DASIA GALARZA   TECHNIQUE: Contiguous axial images of the chest, abdomen, and pelvis were obtained after the intravenous administration of 90 mL Omnipaque 350 contrast. Coronal and sagittal reformatted images were reconstructed from the axial data.   FINDINGS: CT CHEST:   MEDIASTINUM AND LYMPH NODES: Newly enlarged AP window lymph node measures 1.1 cm in short axis diameter concerning for metastatic involvement. There are also right interlobar lymph nodes measuring 1.1 cm x 0.9 cm better newly increased in size from 11/19/2021  concerning for metastatic involvement. No pneumomediastinum.  There is a prominent left cervical lymph node measuring 0.7 cm in short axis diameter.   VESSELS: Normal caliber aorta without dissection. Moderate aortic atherosclerosis.   HEART: Normal size.  Mild coronary artery calcifications. No significant pericardial effusion.   LUNG, AIRWAYS, PLEURA: There is debris within the subglottic trachea and large amount of layering debris in the left mainstem bronchus extending into the left upper lobe bronchi and proximal left lower lobe bronchus. Redemonstration of advanced emphysema. Postsurgical changes of left upper and lower lobe wedge resection are again noted. There several dozen bilateral pulmonary nodules measuring up to 1.6 cm in the left upper lobe. Some of these are new from 11/16/2021 and 08/03/2021 such as a 0.5 cm nodule in the left lower lobe (axial image 166/342, series 205) and 0.4 cm nodule in the medial right lower lobe (axial image 173/342, series 205). However, most of these nodules were present on 11/16/2021 and intervally larger, such as a 1.3 cm left upper lobe nodule (axial image 83/342, series 205), 1.6 cm nodule in the left upper lobe (axial image 103/342, series 205) 1 cm and 0.7 cm nodule in the right lower lobe (axial image 172,179/342, series 205); other pre-existing larger nodules are annotated on series 205. No consolidation, pulmonary edema, pleural effusion or pneumothorax. Within the apical segment of the right upper lobe is the 0.7 cm ground-glass nodule unchanged from 11/19/2021 likely representing a low grade adenocarcinoma variant such as atypical adenomatous hyperplasia or adenocarcinoma in situ.   CHEST WALL SOFT TISSUES: No discernible abnormality.   OSSEOUS STRUCTURES: No acute osseous abnormality. No suspicious osseous lesions. There is a severe dextroconvex thoracic and levoconvex upper lumbar scoliosis.   OTHER: There is a 2.8 cm x 2.5 cm x 1.5 cm enhancing infiltrative  mass in the surgical bed of the left thyroid lobe that results in gabriella, transmural tracheal wall invasion and intraluminal extension causing mild narrowing of the trachea. There is also mass effect upon the adjacent esophagus with loss of fat plane, concerning for esophageal wall involvement.     CT ABDOMEN/PELVIS:   ABDOMINAL WALL: No significant abnormality.   LIVER: No significant parenchymal abnormality.   BILE DUCTS: Prominent intrahepatic and extrahepatic bile ducts are likely secondary to post-cholecystectomy status.   GALLBLADDER: Gallbladder is surgically absent.   PANCREAS: No significant abnormality.   SPLEEN: No significant abnormality.   ADRENALS: There is stable nodular thickening of the (left > right) adrenal glands.   KIDNEYS, URETERS, BLADDER: The left kidney is moderately atrophic with lobulated contour due to multifocal scarring. There is a 1.5 cm staghorn calculus in the left renal pelvis. Homogeneously hypodense lesions in the left kidney measuring up to 2.3 cm in the superior pole are favored to represent simple cysts.   REPRODUCTIVE ORGANS: The uterus is surgically absent. The left ovary is asymmetrically larger compared to the right which is similar to 11/16/2021.   VESSELS: There are moderate atherosclerotic changes of the abdominal aorta and its branches.   RETROPERITONEUM/LYMPH NODES: No enlarged lymph nodes. No acute retroperitoneal abnormality.   BOWEL/MESENTERY/PERITONEUM: No inflammatory bowel wall thickening or dilatation. The appendix is not definitively visualized. However, there is no pericecal fluid or stranding.   No ascites, free air, or fluid collection.     MUSCULOSKELETAL: No acute osseous abnormality. Marked S-shaped scoliotic curvature of the thoracolumbar spine. Multilevel degenerative changes of the thoracolumbar spine.       1. Interval increased soft tissue thickening in the left thyroid surgical bed (i.e., a 2.8 cm x 2.5 cm x 1.5 cm enhancing lesion with transmural  tracheal wall invasion causing mild narrowing of the tracheal lumen), probable involvement of the cervical esophageal wall, and adjacent prominent left cervical lymph node. Findings are consistent with locoregional progression of known thyroid cancer. This is most likely de-differentiated thyroid carcinoma given its intense metabolic activity on PET CT 11/16/2021. 2. Dozens of bilateral pulmonary nodules, a few which are new from 11/16/2021 (annotated on series 205); however, the majority were previously present (11/16/2021, 08/03/2021) and have increased in size (annotated on series 205). Differentiation between metastatic lung adenocarcinoma and metastatic thyroid carcinoma cannot be made on anatomic imaging alone, and both malignancies demonstrates similar intense hypermetabolic activity on previous PET CT, in which it was suggested the thyroid cancer was likely de-differentiated, making iodine scan less sensitive for detection. Therefore, a biopsy of both a pre-existing but larger pulmonary nodule and a new pulmonary nodule could be considered to evaluate for dual-source pulmonary metastases. 3. New enlargement of an aortopulmonary window lymph node and right interlobar lymph nodes suspicious for metastatic disease. 4. Probable low-grade adenocarcinoma variant in the right lung apex (0.7 cm), such as adenocarcinoma in situ or atypical adenomatous hyperplasia, stable in size from prior study. 5. Mucous debris in the trachea, left main stem bronchus and left upper and lower lobe bronchi. 6. Similar moderately atrophic left kidney with extensive scarring and interval enlargement of 1.5 cm staghorn calculus in the left renal pelvis.       I personally reviewed the images/study and I agree with the findings as stated above by resident physician, Lance Molina MD. This study was interpreted at [facility], [place], [place].   MACRO: None.   Signed by: Lance Jose 11/23/2023 10:15  AM Dictation workstation:   [id number]    CT cervical spine wo IV contrast    Result Date: 11/23/2023  Interpreted By:  Jeanine Vargas and Benza Andrew STUDY: CT CERVICAL SPINE WO IV CONTRAST; CT THORACIC SPINE WO IV CONTRAST; CT LUMBAR SPINE WO IV CONTRAST;  11/23/2023 8:02 am   INDICATION: Signs/Symptoms:concern for mets with new acute weakness.   COMPARISON: MRI cervical spine, thoracic spine, and lumbar spine dated 11/23/2023   ACCESSION NUMBER(S): [id number]; [id number]; [id number]   ORDERING CLINICIAN: DASIA GALARZA   TECHNIQUE: Axial noncontrast images of the cervical, thoracic and lumbar spine with coronal and sagittal reconstructed images.   FINDINGS: CT CERVICAL SPINE:   PREVERTEBRAL SOFT TISSUES: Within normal limits.   CRANIOCERVICAL JUNCTION: Intact.   ALIGNMENT: No traumatic malalignment or traumatic facet widening.   VERTEBRAE: No acute fracture. Vertebral body heights are maintained.   SPINAL CANAL/INTERVERTEBRAL DISCS: No high-grade spinal canal stenosis. Varying degrees of degenerative disc disease, notable for moderate disc height loss at C5-C6.   NEURAL FORAMINA: Multilevel uncovertebral joint and facet arthropathy notably contribute to moderate bilateral neural foraminal stenosis at C5-C6 and mild left neural foraminal stenosis at C4-C5.   OTHER: None.     CT THORACIC SPINE:   PARASPINAL SOFT TISSUES: No significant abnormality.   ALIGNMENT: There is marked S shaped curvature of the thoracolumbar spine. No traumatic malalignment or traumatic facet widening.   VERTEBRAE: No acute fracture. Vertebral body heights are maintained.   SPINAL CANAL/INTERVERTEBRAL DISCS: No high-grade spinal canal stenosis. Varying degrees of degenerative disc disease, most significant in the midthoracic spine.   VISUALIZED CHEST: Several pulmonary nodules are visualized. Please see concurrently obtained and separately dictated CT chest abdomen and pelvis report for further characterization.     CT LUMBAR  SPINE:   PARASPINAL SOFT TISSUES: No significant abnormality.   ALIGNMENT: There is marked S shaped curvature of the thoracolumbar spine. No traumatic malalignment or traumatic facet widening.   VERTEBRAE: No acute fracture. Vertebral body heights are maintained.   SPINAL CANAL/INTERVERTEBRAL DISCS: No high-grade spinal canal stenosis. Varying degrees of degenerative disc disease, most significant at L1-L2, L2-L3, and L3-L4.   NEURAL FORAMINA:  Varying degrees of neural foraminal stenosis, most significant at L2-L3, L3-L4, and L4-L5.   VISUALIZED ABDOMEN: Please see concurrently obtained and separately dictated CT chest abdomen and pelvis report for further characterization.       1. No CT evidence of metastatic disease in the spine. 2. Several pulmonary nodules visualized. Please see concurrently obtained and separately dictated CT chest abdomen and pelvis report for further characterization. 3. Degenerative changes and scoliosis of the spine as described above.     I personally reviewed the images/study and I agree with the findings as stated above by resident physician, Lance Molina MD. This study was interpreted at [place], [place].   MACRO: None   Signed by: Jeanine Vargas 11/23/2023 8:37 AM Dictation workstation:   [id number]    CT thoracic spine wo IV contrast    Result Date: 11/23/2023  Interpreted By:  Jeanine Vargas and Benza Andrew STUDY: CT CERVICAL SPINE WO IV CONTRAST; CT THORACIC SPINE WO IV CONTRAST; CT LUMBAR SPINE WO IV CONTRAST;  11/23/2023 8:02 am   INDICATION: Signs/Symptoms:concern for mets with new acute weakness.   COMPARISON: MRI cervical spine, thoracic spine, and lumbar spine dated 11/23/2023   ACCESSION NUMBER(S): [id number]; [id number]; [id number]   ORDERING CLINICIAN: DASIA GALARZA   TECHNIQUE: Axial noncontrast images of the cervical, thoracic and lumbar spine with coronal and sagittal reconstructed images.   FINDINGS: CT CERVICAL  SPINE:   PREVERTEBRAL SOFT TISSUES: Within normal limits.   CRANIOCERVICAL JUNCTION: Intact.   ALIGNMENT: No traumatic malalignment or traumatic facet widening.   VERTEBRAE: No acute fracture. Vertebral body heights are maintained.   SPINAL CANAL/INTERVERTEBRAL DISCS: No high-grade spinal canal stenosis. Varying degrees of degenerative disc disease, notable for moderate disc height loss at C5-C6.   NEURAL FORAMINA: Multilevel uncovertebral joint and facet arthropathy notably contribute to moderate bilateral neural foraminal stenosis at C5-C6 and mild left neural foraminal stenosis at C4-C5.   OTHER: None.     CT THORACIC SPINE:   PARASPINAL SOFT TISSUES: No significant abnormality.   ALIGNMENT: There is marked S shaped curvature of the thoracolumbar spine. No traumatic malalignment or traumatic facet widening.   VERTEBRAE: No acute fracture. Vertebral body heights are maintained.   SPINAL CANAL/INTERVERTEBRAL DISCS: No high-grade spinal canal stenosis. Varying degrees of degenerative disc disease, most significant in the midthoracic spine.   VISUALIZED CHEST: Several pulmonary nodules are visualized. Please see concurrently obtained and separately dictated CT chest abdomen and pelvis report for further characterization.     CT LUMBAR SPINE:   PARASPINAL SOFT TISSUES: No significant abnormality.   ALIGNMENT: There is marked S shaped curvature of the thoracolumbar spine. No traumatic malalignment or traumatic facet widening.   VERTEBRAE: No acute fracture. Vertebral body heights are maintained.   SPINAL CANAL/INTERVERTEBRAL DISCS: No high-grade spinal canal stenosis. Varying degrees of degenerative disc disease, most significant at L1-L2, L2-L3, and L3-L4.   NEURAL FORAMINA:  Varying degrees of neural foraminal stenosis, most significant at L2-L3, L3-L4, and L4-L5.   VISUALIZED ABDOMEN: Please see concurrently obtained and separately dictated CT chest abdomen and pelvis report for further characterization.       1. No  CT evidence of metastatic disease in the spine. 2. Several pulmonary nodules visualized. Please see concurrently obtained and separately dictated CT chest abdomen and pelvis report for further characterization. 3. Degenerative changes and scoliosis of the spine as described above.     I personally reviewed the images/study and I agree with the findings as stated above by resident physician, Lance Molina MD. This study was interpreted at [facility], [place], [place].   MACRO: None   Signed by: Jeanine Vargas 11/23/2023 8:37 AM Dictation workstation:   [id number]    CT lumbar spine wo IV contrast    Result Date: 11/23/2023  Interpreted By:  Jeanine Vargas,  and Jesse Lucas STUDY: CT CERVICAL SPINE WO IV CONTRAST; CT THORACIC SPINE WO IV CONTRAST; CT LUMBAR SPINE WO IV CONTRAST;  11/23/2023 8:02 am   INDICATION: Signs/Symptoms:concern for mets with new acute weakness.   COMPARISON: MRI cervical spine, thoracic spine, and lumbar spine dated 11/23/2023   ACCESSION NUMBER(S): [id number]; [id number]; [id number]   ORDERING CLINICIAN: DASIA GALARZA   TECHNIQUE: Axial noncontrast images of the cervical, thoracic and lumbar spine with coronal and sagittal reconstructed images.   FINDINGS: CT CERVICAL SPINE:   PREVERTEBRAL SOFT TISSUES: Within normal limits.   CRANIOCERVICAL JUNCTION: Intact.   ALIGNMENT: No traumatic malalignment or traumatic facet widening.   VERTEBRAE: No acute fracture. Vertebral body heights are maintained.   SPINAL CANAL/INTERVERTEBRAL DISCS: No high-grade spinal canal stenosis. Varying degrees of degenerative disc disease, notable for moderate disc height loss at C5-C6.   NEURAL FORAMINA: Multilevel uncovertebral joint and facet arthropathy notably contribute to moderate bilateral neural foraminal stenosis at C5-C6 and mild left neural foraminal stenosis at C4-C5.   OTHER: None.     CT THORACIC SPINE:   PARASPINAL SOFT TISSUES: No significant abnormality.    ALIGNMENT: There is marked S shaped curvature of the thoracolumbar spine. No traumatic malalignment or traumatic facet widening.   VERTEBRAE: No acute fracture. Vertebral body heights are maintained.   SPINAL CANAL/INTERVERTEBRAL DISCS: No high-grade spinal canal stenosis. Varying degrees of degenerative disc disease, most significant in the midthoracic spine.   VISUALIZED CHEST: Several pulmonary nodules are visualized. Please see concurrently obtained and separately dictated CT chest abdomen and pelvis report for further characterization.     CT LUMBAR SPINE:   PARASPINAL SOFT TISSUES: No significant abnormality.   ALIGNMENT: There is marked S shaped curvature of the thoracolumbar spine. No traumatic malalignment or traumatic facet widening.   VERTEBRAE: No acute fracture. Vertebral body heights are maintained.   SPINAL CANAL/INTERVERTEBRAL DISCS: No high-grade spinal canal stenosis. Varying degrees of degenerative disc disease, most significant at L1-L2, L2-L3, and L3-L4.   NEURAL FORAMINA:  Varying degrees of neural foraminal stenosis, most significant at L2-L3, L3-L4, and L4-L5.   VISUALIZED ABDOMEN: Please see concurrently obtained and separately dictated CT chest abdomen and pelvis report for further characterization.       1. No CT evidence of metastatic disease in the spine. 2. Several pulmonary nodules visualized. Please see concurrently obtained and separately dictated CT chest abdomen and pelvis report for further characterization. 3. Degenerative changes and scoliosis of the spine as described above.     I personally reviewed the images/study and I agree with the findings as stated above by resident physician, Lance Molina MD. This study was interpreted at [place], [place].   MACRO: None   Signed by: Jeanine Vargas 11/23/2023 8:37 AM Dictation workstation:   [id number]    MR lumbar spine wo IV contrast    Result Date: 11/23/2023  Interpreted By:  Franco  Gregorio Sun STUDY: MR BRAIN WO IV CONTRAST; MR CERVICAL SPINE WO IV CONTRAST; MR LUMBAR SPINE WO IV CONTRAST; MR THORACIC SPINE WO IV CONTRAST;  11/23/2023 5:14 am   INDICATION: Signs/Symptoms:new mass with neuro changes; Signs/Symptoms:concern for mets.   Per EMR: History of lung adenocarcinoma presented as transfer from outside hospital to be evaluated by Neurosurgery. Originally presented for weakness and gait difficulty CT brain and outside hospital demonstrated mass with vasogenic edema.   COMPARISON: CT angio chest, 11/19/2021, PET-CT 11/16/2021, CT head 04/12/2019   ACCESSION NUMBER(S): [id number]; [id number]; [id number]; [id number]   ORDERING CLINICIAN: DASIA GALARZA   TECHNIQUE: Axial T2, FLAIR, DWI, gradient echo T2 and sagittal and coronal T1 weighted images of brain were acquired. No IV contrast was given. Sagittal T1, T2, STIR, axial T1 and T2 weighted images of the cervical, thoracic, and lumbar spine were acquired.   FINDINGS: Please note this examination is moderately limited due to motion artifact.   MRI BRAIN:   CSF Spaces: There is sulcal effacement and mild effacement of the anterior horn left lateral ventricle from significant vasogenic edema surrounding dominant mass lesion within the d left frontal lobe   Parenchyma: Punctate areas of diffusion restriction are noted at the gray-white junction within the right occipital lobe, in multiple areas adjacent to the central sulcus which most likely represent restricting punctate metastatic lesions. There is a rim of diffusion restriction surrounding the dominant lesion within the left frontal lobe as well. Lastly, there is a punctate focus of diffusion restriction within the right basal ganglia which also corresponds to most likely metastatic lesion. No diffusion restriction to suggest infarction.   Examination of metastatic lesions is limited without IV contrast material. However, numerous mass lesions are noted throughout the  bilateral cerebral parenchyma which appear isointense to gray matter on FLAIR imaging, and hyperintense on T2 weighted imaging. A few of the largest lesions are as follows: *Left frontal lobe dominant mass (FLAIR image 19) measures up to 2.2 x 2.2 cm with significant surrounding vasogenic edema, sulcal effacement, and foci of hemosiderin deposition likely indicating hemorrhagic component. *Superior right parietal lobe (FLAIR image 8) measures up to 1.5 x 1.1 cm with significant surrounding vasogenic edema. *Right occipital lobe (FLAIR image 16) measuring up to 1.1 x 1.0 cm *Superior left frontal lobe (FLAIR image 5) with significant surrounding vasogenic edema, sulcal effacement measuring up to 1.1 x 0.9 cm with foci of hemosiderin deposition likely indicating hemorrhagic component.   Multiple additional punctate sites of disease are noted but are limited for evaluation without contrast material. There is no evidence of herniation.   Paranasal Sinuses and Mastoids: Visualized paranasal sinuses and mastoid air cells are unremarkable.     CERVICAL SPINE:   Alignment: Within normal limits.   Vertebrae/Intervertebral Discs: The vertebral bodies demonstrate expected height. The marrow signal is within normal limits. No marrow replacing lesions are identified. There is diffuse loss of normal disc signal.   Cord: Normal in caliber and signal.   C1-C2: The cervicomedullary junction appears unremarkable. There is no central canal stenosis.   C2-C3: There is no posterior disc contour abnormality. There is mild left neural foraminal stenosis. No spinal canal stenosis.   C3-C4: There is no posterior disc contour abnormality. There is mild left neural foraminal stenosis. No spinal canal stenosis.   C4-C5: There is no posterior disc contour abnormality. There is mild left neural foraminal stenosis. No spinal canal stenosis.   C5-C6: There is mild posterior disc bulge which flattens the ventral thecal sac without significant  spinal canal stenosis. No significant neural foraminal stenosis.   C6-C7: No significant spinal canal or neural foraminal stenosis.   C7-T1: No significant spinal canal or neural foraminal stenosis.   The prevertebral and posterior paraspinous soft tissues are within normal limits.     THORACIC SPINE:   Alignment: Significant scoliotic curve is noted. Otherwise no malalignment is within normal limits.   Vertebrae/Intervertebral Discs: The vertebral body heights are intact.  Marrow signal is within normal limits. No marrow replacing lesions are identified. There is mild loss of disc height and there is diffuse loss of normal disc signal.   Cord: Normal in signal.   T1-2: There is no spinal canal or neural foraminal stenosis.   T2-3: There is no spinal canal or neural foraminal stenosis.   T3-4: There is no spinal canal or neural foraminal stenosis.   T4-5: There no spinal canal or neural foraminal stenosis.   T5-6: There is no spinal canal or neural foraminal stenosis.   T6-7: There is no spinal canal or neural foraminal stenosis.   T7-8: There is no spinal canal or neural foraminal stenosis.   T8-9: There is no spinal canal or neural foraminal stenosis.   T9-10: There is no spinal canal or neural foraminal stenosis.   T10-11: There is no spinal canal or neural foraminal stenosis.   T11-12: There is no spinal canal or neural foraminal stenosis.   Paraspinous Soft Tissues: Multiple, incompletely characterized pulmonary nodules are seen     LUMBAR SPINE:   Alignment: Levo scoliotic curve of the lumbar spine is noted.   Vertebrae/Intervertebral Discs: The vertebral bodies demonstrate expected height.The marrow signal is within normal limits. There is diffuse loss of normal intervertebral disc signal.   Conus: The conus terminates at T12.   T12-L1: Evaluation of the neural foramina are significantly limited due to scoliotic curvature. However, there is mild right neural foraminal stenosis without significant spinal canal  or left neural foraminal stenosis.   L1-2:  There is mild left neural foraminal stenosis without significant spinal canal or right neural foraminal stenosis.   L2-3: Mild posterior disc bulge abuts the ventral thecal sac without significant spinal canal stenosis. However, there is mild left neural foraminal stenosis without significant right neural foraminal stenosis.   L3-4: Posterior projecting disc bulge abuts the ventral thecal sac without significant spinal canal stenosis. There is mild ligamentum flavum hypertrophy and facet arthropathy at this level contributing to mild right and moderate left neural foraminal stenosis.   L4-5: Posterior disc bulge abuts the ventral thecal sac. There is ligamentum flavum hypertrophy at this level with facet arthropathy resulting in mild spinal canal stenosis. Additionally, there is moderate to severe neural foraminal stenosis on the left without significant neural foraminal stenosis on the right.   L5-S1: Diffuse posterior disc bulge flattens the ventral thecal sac along with ligamentum flavum hypertrophy and facet arthropathy contribute to mild spinal canal stenosis. There is mild neural foraminal stenosis on the left without significant neural foraminal stenosis on the right. There is no significant central canal or neural foraminal stenosis.   Paraspinous muscle atrophy detected.       MR BRAIN: 1. Numerous cerebral lesions scattered throughout the bilateral cerebral hemispheres with significant surrounding vasogenic edema, components of hemorrhage, and components of diffusion restriction consistent with metastatic disease. However, evaluation is somewhat limited due to noncontrast examination. Recommend repeat examination with contrast for further characterization. 2. No evidence of diffusion restriction to suggest infarction.     MRI CERVICAL, THORACIC, AND LUMBAR SPINE: 1. No marrow replacing lesions are noted within the cervical, thoracic, or lumbar spine. 2. Scoliotic  curve within the thoracolumbar spine results in multilevel discogenic degenerative change most significant at L4-L5 where degenerative change contributes to mild-to-moderate spinal canal stenosis and moderate to severe left neural foramina stenosis. Additional sites of degenerative change are described above in detail. 3. No significant spinal canal or neural foraminal stenosis noted within the cervical or thoracic spine. If persistent concern of metastatic disease to the spine, contrast-enhanced examination is advised 4. Numerous pulmonary nodules not well interrogated on this examination. Correlation any known outside imaging advised   I personally reviewed the images/study and I agree with the findings as stated above by resident physician, Dr. Derick Moon. The study was interpreted at [facility] in [facility].   Signed by: Dino Gamez 11/23/2023 6:25 AM Dictation workstation:   [id number]    MR thoracic spine wo IV contrast    Result Date: 11/23/2023  Interpreted By:  Dino Gamez and Liller Gregory STUDY: MR BRAIN WO IV CONTRAST; MR CERVICAL SPINE WO IV CONTRAST; MR LUMBAR SPINE WO IV CONTRAST; MR THORACIC SPINE WO IV CONTRAST;  11/23/2023 5:14 am   INDICATION: Signs/Symptoms:new mass with neuro changes; Signs/Symptoms:concern for mets.   Per EMR: History of lung adenocarcinoma presented as transfer from outside hospital to be evaluated by Neurosurgery. Originally presented for weakness and gait difficulty CT brain and outside hospital demonstrated mass with vasogenic edema.   COMPARISON: CT angio chest, 11/19/2021, PET-CT 11/16/2021, CT head 04/12/2019   ACCESSION NUMBER(S): [id number]; [id number]; [id number]; [id number]   ORDERING CLINICIAN: DASIA GALARZA   TECHNIQUE: Axial T2, FLAIR, DWI, gradient echo T2 and sagittal and coronal T1 weighted images of brain were acquired. No IV contrast was given. Sagittal T1, T2, STIR, axial T1 and T2 weighted  images of the cervical, thoracic, and lumbar spine were acquired.   FINDINGS: Please note this examination is moderately limited due to motion artifact.   MRI BRAIN:   CSF Spaces: There is sulcal effacement and mild effacement of the anterior horn left lateral ventricle from significant vasogenic edema surrounding dominant mass lesion within the d left frontal lobe   Parenchyma: Punctate areas of diffusion restriction are noted at the gray-white junction within the right occipital lobe, in multiple areas adjacent to the central sulcus which most likely represent restricting punctate metastatic lesions. There is a rim of diffusion restriction surrounding the dominant lesion within the left frontal lobe as well. Lastly, there is a punctate focus of diffusion restriction within the right basal ganglia which also corresponds to most likely metastatic lesion. No diffusion restriction to suggest infarction.   Examination of metastatic lesions is limited without IV contrast material. However, numerous mass lesions are noted throughout the bilateral cerebral parenchyma which appear isointense to gray matter on FLAIR imaging, and hyperintense on T2 weighted imaging. A few of the largest lesions are as follows: *Left frontal lobe dominant mass (FLAIR image 19) measures up to 2.2 x 2.2 cm with significant surrounding vasogenic edema, sulcal effacement, and foci of hemosiderin deposition likely indicating hemorrhagic component. *Superior right parietal lobe (FLAIR image 8) measures up to 1.5 x 1.1 cm with significant surrounding vasogenic edema. *Right occipital lobe (FLAIR image 16) measuring up to 1.1 x 1.0 cm *Superior left frontal lobe (FLAIR image 5) with significant surrounding vasogenic edema, sulcal effacement measuring up to 1.1 x 0.9 cm with foci of hemosiderin deposition likely indicating hemorrhagic component.   Multiple additional punctate sites of disease are noted but are limited for evaluation without contrast  material. There is no evidence of herniation.   Paranasal Sinuses and Mastoids: Visualized paranasal sinuses and mastoid air cells are unremarkable.     CERVICAL SPINE:   Alignment: Within normal limits.   Vertebrae/Intervertebral Discs: The vertebral bodies demonstrate expected height. The marrow signal is within normal limits. No marrow replacing lesions are identified. There is diffuse loss of normal disc signal.   Cord: Normal in caliber and signal.   C1-C2: The cervicomedullary junction appears unremarkable. There is no central canal stenosis.   C2-C3: There is no posterior disc contour abnormality. There is mild left neural foraminal stenosis. No spinal canal stenosis.   C3-C4: There is no posterior disc contour abnormality. There is mild left neural foraminal stenosis. No spinal canal stenosis.   C4-C5: There is no posterior disc contour abnormality. There is mild left neural foraminal stenosis. No spinal canal stenosis.   C5-C6: There is mild posterior disc bulge which flattens the ventral thecal sac without significant spinal canal stenosis. No significant neural foraminal stenosis.   C6-C7: No significant spinal canal or neural foraminal stenosis.   C7-T1: No significant spinal canal or neural foraminal stenosis.   The prevertebral and posterior paraspinous soft tissues are within normal limits.     THORACIC SPINE:   Alignment: Significant scoliotic curve is noted. Otherwise no malalignment is within normal limits.   Vertebrae/Intervertebral Discs: The vertebral body heights are intact.  Marrow signal is within normal limits. No marrow replacing lesions are identified. There is mild loss of disc height and there is diffuse loss of normal disc signal.   Cord: Normal in signal.   T1-2: There is no spinal canal or neural foraminal stenosis.   T2-3: There is no spinal canal or neural foraminal stenosis.   T3-4: There is no spinal canal or neural foraminal stenosis.   T4-5: There no spinal canal or neural  foraminal stenosis.   T5-6: There is no spinal canal or neural foraminal stenosis.   T6-7: There is no spinal canal or neural foraminal stenosis.   T7-8: There is no spinal canal or neural foraminal stenosis.   T8-9: There is no spinal canal or neural foraminal stenosis.   T9-10: There is no spinal canal or neural foraminal stenosis.   T10-11: There is no spinal canal or neural foraminal stenosis.   T11-12: There is no spinal canal or neural foraminal stenosis.   Paraspinous Soft Tissues: Multiple, incompletely characterized pulmonary nodules are seen     LUMBAR SPINE:   Alignment: Levo scoliotic curve of the lumbar spine is noted.   Vertebrae/Intervertebral Discs: The vertebral bodies demonstrate expected height.The marrow signal is within normal limits. There is diffuse loss of normal intervertebral disc signal.   Conus: The conus terminates at T12.   T12-L1: Evaluation of the neural foramina are significantly limited due to scoliotic curvature. However, there is mild right neural foraminal stenosis without significant spinal canal or left neural foraminal stenosis.   L1-2:  There is mild left neural foraminal stenosis without significant spinal canal or right neural foraminal stenosis.   L2-3: Mild posterior disc bulge abuts the ventral thecal sac without significant spinal canal stenosis. However, there is mild left neural foraminal stenosis without significant right neural foraminal stenosis.   L3-4: Posterior projecting disc bulge abuts the ventral thecal sac without significant spinal canal stenosis. There is mild ligamentum flavum hypertrophy and facet arthropathy at this level contributing to mild right and moderate left neural foraminal stenosis.   L4-5: Posterior disc bulge abuts the ventral thecal sac. There is ligamentum flavum hypertrophy at this level with facet arthropathy resulting in mild spinal canal stenosis. Additionally, there is moderate to severe neural foraminal stenosis on the left without  significant neural foraminal stenosis on the right.   L5-S1: Diffuse posterior disc bulge flattens the ventral thecal sac along with ligamentum flavum hypertrophy and facet arthropathy contribute to mild spinal canal stenosis. There is mild neural foraminal stenosis on the left without significant neural foraminal stenosis on the right. There is no significant central canal or neural foraminal stenosis.   Paraspinous muscle atrophy detected.       MR BRAIN: 1. Numerous cerebral lesions scattered throughout the bilateral cerebral hemispheres with significant surrounding vasogenic edema, components of hemorrhage, and components of diffusion restriction consistent with metastatic disease. However, evaluation is somewhat limited due to noncontrast examination. Recommend repeat examination with contrast for further characterization. 2. No evidence of diffusion restriction to suggest infarction.     MRI CERVICAL, THORACIC, AND LUMBAR SPINE: 1. No marrow replacing lesions are noted within the cervical, thoracic, or lumbar spine. 2. Scoliotic curve within the thoracolumbar spine results in multilevel discogenic degenerative change most significant at L4-L5 where degenerative change contributes to mild-to-moderate spinal canal stenosis and moderate to severe left neural foramina stenosis. Additional sites of degenerative change are described above in detail. 3. No significant spinal canal or neural foraminal stenosis noted within the cervical or thoracic spine. If persistent concern of metastatic disease to the spine, contrast-enhanced examination is advised 4. Numerous pulmonary nodules not well interrogated on this examination. Correlation any known outside imaging advised   I personally reviewed the images/study and I agree with the findings as stated above by resident physician, Dr. Derick Moon. The study was interpreted at [facility] in [facility].   Signed by: Dino Gamez  11/23/2023 6:25 AM Dictation workstation:   [id number]    MR cervical spine wo IV contrast    Result Date: 11/23/2023  Interpreted By:  Dino Gamez and Liller Gregory STUDY: MR BRAIN WO IV CONTRAST; MR CERVICAL SPINE WO IV CONTRAST; MR LUMBAR SPINE WO IV CONTRAST; MR THORACIC SPINE WO IV CONTRAST;  11/23/2023 5:14 am   INDICATION: Signs/Symptoms:new mass with neuro changes; Signs/Symptoms:concern for mets.   Per EMR: History of lung adenocarcinoma presented as transfer from outside hospital to be evaluated by Neurosurgery. Originally presented for weakness and gait difficulty CT brain and outside hospital demonstrated mass with vasogenic edema.   COMPARISON: CT angio chest, 11/19/2021, PET-CT 11/16/2021, CT head 04/12/2019   ACCESSION NUMBER(S): [id number]; [id number]; [id number]; [id number]   ORDERING CLINICIAN: DASIA GALARZA   TECHNIQUE: Axial T2, FLAIR, DWI, gradient echo T2 and sagittal and coronal T1 weighted images of brain were acquired. No IV contrast was given. Sagittal T1, T2, STIR, axial T1 and T2 weighted images of the cervical, thoracic, and lumbar spine were acquired.   FINDINGS: Please note this examination is moderately limited due to motion artifact.   MRI BRAIN:   CSF Spaces: There is sulcal effacement and mild effacement of the anterior horn left lateral ventricle from significant vasogenic edema surrounding dominant mass lesion within the d left frontal lobe   Parenchyma: Punctate areas of diffusion restriction are noted at the gray-white junction within the right occipital lobe, in multiple areas adjacent to the central sulcus which most likely represent restricting punctate metastatic lesions. There is a rim of diffusion restriction surrounding the dominant lesion within the left frontal lobe as well. Lastly, there is a punctate focus of diffusion restriction within the right basal ganglia which also corresponds to most likely metastatic lesion. No diffusion restriction to  suggest infarction.   Examination of metastatic lesions is limited without IV contrast material. However, numerous mass lesions are noted throughout the bilateral cerebral parenchyma which appear isointense to gray matter on FLAIR imaging, and hyperintense on T2 weighted imaging. A few of the largest lesions are as follows: *Left frontal lobe dominant mass (FLAIR image 19) measures up to 2.2 x 2.2 cm with significant surrounding vasogenic edema, sulcal effacement, and foci of hemosiderin deposition likely indicating hemorrhagic component. *Superior right parietal lobe (FLAIR image 8) measures up to 1.5 x 1.1 cm with significant surrounding vasogenic edema. *Right occipital lobe (FLAIR image 16) measuring up to 1.1 x 1.0 cm *Superior left frontal lobe (FLAIR image 5) with significant surrounding vasogenic edema, sulcal effacement measuring up to 1.1 x 0.9 cm with foci of hemosiderin deposition likely indicating hemorrhagic component.   Multiple additional punctate sites of disease are noted but are limited for evaluation without contrast material. There is no evidence of herniation.   Paranasal Sinuses and Mastoids: Visualized paranasal sinuses and mastoid air cells are unremarkable.     CERVICAL SPINE:   Alignment: Within normal limits.   Vertebrae/Intervertebral Discs: The vertebral bodies demonstrate expected height. The marrow signal is within normal limits. No marrow replacing lesions are identified. There is diffuse loss of normal disc signal.   Cord: Normal in caliber and signal.   C1-C2: The cervicomedullary junction appears unremarkable. There is no central canal stenosis.   C2-C3: There is no posterior disc contour abnormality. There is mild left neural foraminal stenosis. No spinal canal stenosis.   C3-C4: There is no posterior disc contour abnormality. There is mild left neural foraminal stenosis. No spinal canal stenosis.   C4-C5: There is no posterior disc contour abnormality. There is mild left neural  foraminal stenosis. No spinal canal stenosis.   C5-C6: There is mild posterior disc bulge which flattens the ventral thecal sac without significant spinal canal stenosis. No significant neural foraminal stenosis.   C6-C7: No significant spinal canal or neural foraminal stenosis.   C7-T1: No significant spinal canal or neural foraminal stenosis.   The prevertebral and posterior paraspinous soft tissues are within normal limits.     THORACIC SPINE:   Alignment: Significant scoliotic curve is noted. Otherwise no malalignment is within normal limits.   Vertebrae/Intervertebral Discs: The vertebral body heights are intact.  Marrow signal is within normal limits. No marrow replacing lesions are identified. There is mild loss of disc height and there is diffuse loss of normal disc signal.   Cord: Normal in signal.   T1-2: There is no spinal canal or neural foraminal stenosis.   T2-3: There is no spinal canal or neural foraminal stenosis.   T3-4: There is no spinal canal or neural foraminal stenosis.   T4-5: There no spinal canal or neural foraminal stenosis.   T5-6: There is no spinal canal or neural foraminal stenosis.   T6-7: There is no spinal canal or neural foraminal stenosis.   T7-8: There is no spinal canal or neural foraminal stenosis.   T8-9: There is no spinal canal or neural foraminal stenosis.   T9-10: There is no spinal canal or neural foraminal stenosis.   T10-11: There is no spinal canal or neural foraminal stenosis.   T11-12: There is no spinal canal or neural foraminal stenosis.   Paraspinous Soft Tissues: Multiple, incompletely characterized pulmonary nodules are seen     LUMBAR SPINE:   Alignment: Levo scoliotic curve of the lumbar spine is noted.   Vertebrae/Intervertebral Discs: The vertebral bodies demonstrate expected height.The marrow signal is within normal limits. There is diffuse loss of normal intervertebral disc signal.   Conus: The conus terminates at T12.   T12-L1: Evaluation of the neural  foramina are significantly limited due to scoliotic curvature. However, there is mild right neural foraminal stenosis without significant spinal canal or left neural foraminal stenosis.   L1-2:  There is mild left neural foraminal stenosis without significant spinal canal or right neural foraminal stenosis.   L2-3: Mild posterior disc bulge abuts the ventral thecal sac without significant spinal canal stenosis. However, there is mild left neural foraminal stenosis without significant right neural foraminal stenosis.   L3-4: Posterior projecting disc bulge abuts the ventral thecal sac without significant spinal canal stenosis. There is mild ligamentum flavum hypertrophy and facet arthropathy at this level contributing to mild right and moderate left neural foraminal stenosis.   L4-5: Posterior disc bulge abuts the ventral thecal sac. There is ligamentum flavum hypertrophy at this level with facet arthropathy resulting in mild spinal canal stenosis. Additionally, there is moderate to severe neural foraminal stenosis on the left without significant neural foraminal stenosis on the right.   L5-S1: Diffuse posterior disc bulge flattens the ventral thecal sac along with ligamentum flavum hypertrophy and facet arthropathy contribute to mild spinal canal stenosis. There is mild neural foraminal stenosis on the left without significant neural foraminal stenosis on the right. There is no significant central canal or neural foraminal stenosis.   Paraspinous muscle atrophy detected.       MR BRAIN: 1. Numerous cerebral lesions scattered throughout the bilateral cerebral hemispheres with significant surrounding vasogenic edema, components of hemorrhage, and components of diffusion restriction consistent with metastatic disease. However, evaluation is somewhat limited due to noncontrast examination. Recommend repeat examination with contrast for further characterization. 2. No evidence of diffusion restriction to suggest  infarction.     MRI CERVICAL, THORACIC, AND LUMBAR SPINE: 1. No marrow replacing lesions are noted within the cervical, thoracic, or lumbar spine. 2. Scoliotic curve within the thoracolumbar spine results in multilevel discogenic degenerative change most significant at L4-L5 where degenerative change contributes to mild-to-moderate spinal canal stenosis and moderate to severe left neural foramina stenosis. Additional sites of degenerative change are described above in detail. 3. No significant spinal canal or neural foraminal stenosis noted within the cervical or thoracic spine. If persistent concern of metastatic disease to the spine, contrast-enhanced examination is advised 4. Numerous pulmonary nodules not well interrogated on this examination. Correlation any known outside imaging advised   I personally reviewed the images/study and I agree with the findings as stated above by resident physician, Dr. Derick Moon. The study was interpreted at [facility] in [facility].   Signed by: Dino Gamez 11/23/2023 6:25 AM Dictation workstation:   [id number]    MR brain wo IV contrast    Result Date: 11/23/2023  Interpreted By:  Dino Gamez and Liller Gregory STUDY: MR BRAIN WO IV CONTRAST; MR CERVICAL SPINE WO IV CONTRAST; MR LUMBAR SPINE WO IV CONTRAST; MR THORACIC SPINE WO IV CONTRAST;  11/23/2023 5:14 am   INDICATION: Signs/Symptoms:new mass with neuro changes; Signs/Symptoms:concern for mets.   Per EMR: History of lung adenocarcinoma presented as transfer from outside hospital to be evaluated by Neurosurgery. Originally presented for weakness and gait difficulty CT brain and outside hospital demonstrated mass with vasogenic edema.   COMPARISON: CT angio chest, 11/19/2021, PET-CT 11/16/2021, CT head 04/12/2019   ACCESSION NUMBER(S): [id number]; [id number]; [id number]; [id number]   ORDERING CLINICIAN: DASIA GALARZA   TECHNIQUE: Axial T2, FLAIR, DWI, gradient  echo T2 and sagittal and coronal T1 weighted images of brain were acquired. No IV contrast was given. Sagittal T1, T2, STIR, axial T1 and T2 weighted images of the cervical, thoracic, and lumbar spine were acquired.   FINDINGS: Please note this examination is moderately limited due to motion artifact.   MRI BRAIN:   CSF Spaces: There is sulcal effacement and mild effacement of the anterior horn left lateral ventricle from significant vasogenic edema surrounding dominant mass lesion within the d left frontal lobe   Parenchyma: Punctate areas of diffusion restriction are noted at the gray-white junction within the right occipital lobe, in multiple areas adjacent to the central sulcus which most likely represent restricting punctate metastatic lesions. There is a rim of diffusion restriction surrounding the dominant lesion within the left frontal lobe as well. Lastly, there is a punctate focus of diffusion restriction within the right basal ganglia which also corresponds to most likely metastatic lesion. No diffusion restriction to suggest infarction.   Examination of metastatic lesions is limited without IV contrast material. However, numerous mass lesions are noted throughout the bilateral cerebral parenchyma which appear isointense to gray matter on FLAIR imaging, and hyperintense on T2 weighted imaging. A few of the largest lesions are as follows: *Left frontal lobe dominant mass (FLAIR image 19) measures up to 2.2 x 2.2 cm with significant surrounding vasogenic edema, sulcal effacement, and foci of hemosiderin deposition likely indicating hemorrhagic component. *Superior right parietal lobe (FLAIR image 8) measures up to 1.5 x 1.1 cm with significant surrounding vasogenic edema. *Right occipital lobe (FLAIR image 16) measuring up to 1.1 x 1.0 cm *Superior left frontal lobe (FLAIR image 5) with significant surrounding vasogenic edema, sulcal effacement measuring up to 1.1 x 0.9 cm with foci of hemosiderin  deposition likely indicating hemorrhagic component.   Multiple additional punctate sites of disease are noted but are limited for evaluation without contrast material. There is no evidence of herniation.   Paranasal Sinuses and Mastoids: Visualized paranasal sinuses and mastoid air cells are unremarkable.     CERVICAL SPINE:   Alignment: Within normal limits.   Vertebrae/Intervertebral Discs: The vertebral bodies demonstrate expected height. The marrow signal is within normal limits. No marrow replacing lesions are identified. There is diffuse loss of normal disc signal.   Cord: Normal in caliber and signal.   C1-C2: The cervicomedullary junction appears unremarkable. There is no central canal stenosis.   C2-C3: There is no posterior disc contour abnormality. There is mild left neural foraminal stenosis. No spinal canal stenosis.   C3-C4: There is no posterior disc contour abnormality. There is mild left neural foraminal stenosis. No spinal canal stenosis.   C4-C5: There is no posterior disc contour abnormality. There is mild left neural foraminal stenosis. No spinal canal stenosis.   C5-C6: There is mild posterior disc bulge which flattens the ventral thecal sac without significant spinal canal stenosis. No significant neural foraminal stenosis.   C6-C7: No significant spinal canal or neural foraminal stenosis.   C7-T1: No significant spinal canal or neural foraminal stenosis.   The prevertebral and posterior paraspinous soft tissues are within normal limits.     THORACIC SPINE:   Alignment: Significant scoliotic curve is noted. Otherwise no malalignment is within normal limits.   Vertebrae/Intervertebral Discs: The vertebral body heights are intact.  Marrow signal is within normal limits. No marrow replacing lesions are identified. There is mild loss of disc height and there is diffuse loss of normal disc signal.   Cord: Normal in signal.   T1-2: There is no spinal canal or neural foraminal stenosis.   T2-3: There  is no spinal canal or neural foraminal stenosis.   T3-4: There is no spinal canal or neural foraminal stenosis.   T4-5: There no spinal canal or neural foraminal stenosis.   T5-6: There is no spinal canal or neural foraminal stenosis.   T6-7: There is no spinal canal or neural foraminal stenosis.   T7-8: There is no spinal canal or neural foraminal stenosis.   T8-9: There is no spinal canal or neural foraminal stenosis.   T9-10: There is no spinal canal or neural foraminal stenosis.   T10-11: There is no spinal canal or neural foraminal stenosis.   T11-12: There is no spinal canal or neural foraminal stenosis.   Paraspinous Soft Tissues: Multiple, incompletely characterized pulmonary nodules are seen     LUMBAR SPINE:   Alignment: Levo scoliotic curve of the lumbar spine is noted.   Vertebrae/Intervertebral Discs: The vertebral bodies demonstrate expected height.The marrow signal is within normal limits. There is diffuse loss of normal intervertebral disc signal.   Conus: The conus terminates at T12.   T12-L1: Evaluation of the neural foramina are significantly limited due to scoliotic curvature. However, there is mild right neural foraminal stenosis without significant spinal canal or left neural foraminal stenosis.   L1-2:  There is mild left neural foraminal stenosis without significant spinal canal or right neural foraminal stenosis.   L2-3: Mild posterior disc bulge abuts the ventral thecal sac without significant spinal canal stenosis. However, there is mild left neural foraminal stenosis without significant right neural foraminal stenosis.   L3-4: Posterior projecting disc bulge abuts the ventral thecal sac without significant spinal canal stenosis. There is mild ligamentum flavum hypertrophy and facet arthropathy at this level contributing to mild right and moderate left neural foraminal stenosis.   L4-5: Posterior disc bulge abuts the ventral thecal sac. There is ligamentum flavum hypertrophy at this level  with facet arthropathy resulting in mild spinal canal stenosis. Additionally, there is moderate to severe neural foraminal stenosis on the left without significant neural foraminal stenosis on the right.   L5-S1: Diffuse posterior disc bulge flattens the ventral thecal sac along with ligamentum flavum hypertrophy and facet arthropathy contribute to mild spinal canal stenosis. There is mild neural foraminal stenosis on the left without significant neural foraminal stenosis on the right. There is no significant central canal or neural foraminal stenosis.   Paraspinous muscle atrophy detected.       MR BRAIN: 1. Numerous cerebral lesions scattered throughout the bilateral cerebral hemispheres with significant surrounding vasogenic edema, components of hemorrhage, and components of diffusion restriction consistent with metastatic disease. However, evaluation is somewhat limited due to noncontrast examination. Recommend repeat examination with contrast for further characterization. 2. No evidence of diffusion restriction to suggest infarction.     MRI CERVICAL, THORACIC, AND LUMBAR SPINE: 1. No marrow replacing lesions are noted within the cervical, thoracic, or lumbar spine. 2. Scoliotic curve within the thoracolumbar spine results in multilevel discogenic degenerative change most significant at L4-L5 where degenerative change contributes to mild-to-moderate spinal canal stenosis and moderate to severe left neural foramina stenosis. Additional sites of degenerative change are described above in detail. 3. No significant spinal canal or neural foraminal stenosis noted within the cervical or thoracic spine. If persistent concern of metastatic disease to the spine, contrast-enhanced examination is advised 4. Numerous pulmonary nodules not well interrogated on this examination. Correlation any known outside imaging advised   I personally reviewed the images/study and I agree with the findings as stated above by resident  physician, Dr. Derick Moon. The study was interpreted at [facility] in [facility].   Signed by: Dino Gamez 11/23/2023 6:25 AM Dictation workstation:   [id number]     Continuous medications     PRN medications  PRN medications: alteplase, cyclobenzaprine, docusate sodium, HYDROcodone-acetaminophen, ipratropium, LORazepam, oxygen, tiZANidine       Assessment/Plan   Principal Problem:    Metastatic adenocarcinoma (CMS/HCC)      Valorie Montelongo is a 79 y/o F w h/o metastatic lung cancer (s/p VATS RAYRAY and LLL resection at  11/18/21 but now follows with oncology at [facility]), h/o papillary thyroid cancer (s/p thyroidectomy 7/2021), oral cancer (s/p resection and XRT), HTN, HLD, NSTEMI (11/25/21), COPD (on 2L), GERD, and anxiety who presents as a transfer from [facility] for neurosurgery evaluation as CT head at OSH showed new metastatic brain lesions. Patient has been experiencing b/l LE weakness, gait changes and speech difficulties that started 10 days prior to admission and have progressively gotten worse. MRI brain without contrast (11/23) showed multiple brain metastases with surrounding vasogenic edema. MRI whole spine (11/23) negative for spinal metastasis. Neurosurgery consulted and following and rec no acute surgical intervention and favor gamma knife radiation. Also rec starting dexamethasone 4mg q6 hours and keppra 500mg BID as well as MRI brain with contrast. MRI brain with contrast ordered and pending, however, delayed as patient was unable to tolerate initially and will need to be pre-medicated. SLP consulted on admission, recs pending. Plan rad onc consult AM of 11/24. Patient admitted for further management.     # New brain metastases   # B/l weakness   - Patient with h/o lung cancer and dev progressive b/l LE weakness, speech and gait changes. OSH CT head imaging concerning for brain mets, patient transferred to [facility] for further  evaluation   - CT whole spine without contrast (11/23): No CT evidence of metastatic disease in the spine, degernative changes and scoliosis noted   - MRI whole spine without contrast (11/23): no lesions noted within the cervical, thoracic or lumbar spine, no significant spinal canal or neural foraminal stenosis   - MRI brain without contrast (11/23): numerus cerebral lesions scattered throughout the b/l cerebral hemispheres with significant sorrouning vasogenic edema, components of hemorrhage - however exam is somewhat limited due to non-contrast examination   - NSGY consulted and rec no acute surgical intervention and favor gamma knife radiation, also rec starting dexamethasone 4mg q6 hours and keppra 500mg BID, and obtaining MRI brain with contrast   - MRI brain with contrast ordered and pending (11/23), patient initially unable to tolerate due to anxiety so will re-attempt with ativan premedication   - start dexamethasone 4mg q6 hours per neurosurgery recommendations   - start Keppra 500mg BID per neurosurgery recommendations   - Q4 neuro checks   - SLP consulted on admission I/s/o new brain mets and occasional reported dysphagia, recs pending   - PT/OT consulted, recs pending   - Plan rad onc consult AM of 11/24     # Lung cancer   - follows with Dr. David Mathias at [facility] - emailed about admission and above radiographic findings 11/23   - s/p VATs RARYAY and LLL resection at  on 11/18/2021   - oncological history limited as community records have limited oncologic information - per patient and , has never received chemotherapy   - CT chest with contrast (11/23): increased soft tissue thickening in the left thyoid surgical bed with probable involvement of the left cervical lymph node, dozens of bilateral pulmonary nodules of which a few are new from previous CT (11/2021), new enlargement of aortopulmonary window lymph nose and right interlobar lymph nodes suspicious for metastatic disease     # H/o papillary  thyroid cancer   # Acquired hypothyroidism   - s/p s/p thyroidectomy 7/2021  - TSH level on admission pending   - Continue with home levothyroxine 137 mcg daily before breakfast     # HTN  - continue with home amlodipine 5mg daily, home Irbesartan substituted for losartan 50mg daily while inpatient     # CAD with H /o NSTEMI  - NSTEMI 11/25/2021   - continue with home atorvastatin 10mg nightly and daily ASA    # COPD  - former smoker, baseline oxygen continuous 2-3L at home   - continue with home Ipratropium 21mcg 2 sprays TID PRN shortness of breath   - continue with home albuterol 90mcg q6 hours PRN wheezing     # Chronic back pain   - CT spine and MRI without contrast (11/23): without evidence of spinal metastasis   - continue with home gabapentin 300mg TID, Norco 5-325mg q4 hours PRN moderate pain, flexeril 10mg TID     # Overreactive bladder   - Home Toviaz substituted for oxybutynin 5mg BID while inpatient     # Anxiety   - continue with home ativan PRN anxiety     # DVT prophy / misc  - hold prophy Lovenox with MRI brain without contrast suspicious components of hemorrhage   - PPI support with dexamethasone     #Dispo   - FULL CODE, confirmed on admission   - Primary oncologist: Dr. Mathias - notified of admission 11/23   - DC pending MRI brain with contrast, NSGY recs, SLP recs, PT/OT recs   - : [phone]         I spent 60 minutes in the professional and overall care of this patient.      Triny Mao PA-C

## 2023-11-24 ENCOUNTER — APPOINTMENT (OUTPATIENT)
Dept: RADIOLOGY | Facility: HOSPITAL | Age: 78
DRG: 054 | End: 2023-11-24
Payer: COMMERCIAL

## 2023-11-24 ENCOUNTER — DOCUMENTATION (OUTPATIENT)
Dept: RADIATION ONCOLOGY | Facility: HOSPITAL | Age: 78
End: 2023-11-24

## 2023-11-24 ENCOUNTER — ANESTHESIA EVENT (OUTPATIENT)
Dept: RADIOLOGY | Facility: HOSPITAL | Age: 78
DRG: 054 | End: 2023-11-24
Payer: COMMERCIAL

## 2023-11-24 ENCOUNTER — ANESTHESIA (OUTPATIENT)
Dept: RADIOLOGY | Facility: HOSPITAL | Age: 78
DRG: 054 | End: 2023-11-24
Payer: COMMERCIAL

## 2023-11-24 PROBLEM — C34.90 PULMONARY CANCER (MULTI): Status: ACTIVE | Noted: 2023-11-24

## 2023-11-24 PROBLEM — F41.9 ANXIETY: Status: ACTIVE | Noted: 2023-11-24

## 2023-11-24 PROBLEM — J44.9 CHRONIC OBSTRUCTIVE PULMONARY DISEASE (MULTI): Status: ACTIVE | Noted: 2023-11-24

## 2023-11-24 PROBLEM — Z99.81 HISTORY OF HOME OXYGEN THERAPY: Status: ACTIVE | Noted: 2023-11-24

## 2023-11-24 PROBLEM — I10 HTN (HYPERTENSION): Status: ACTIVE | Noted: 2023-11-24

## 2023-11-24 PROBLEM — I21.9 MI (MYOCARDIAL INFARCTION) (MULTI): Status: ACTIVE | Noted: 2023-11-24

## 2023-11-24 PROBLEM — E78.5 HYPERLIPIDEMIA: Status: ACTIVE | Noted: 2023-11-24

## 2023-11-24 PROBLEM — K21.9 GASTROESOPHAGEAL REFLUX DISEASE: Status: ACTIVE | Noted: 2023-11-24

## 2023-11-24 LAB
ALBUMIN SERPL BCP-MCNC: 3.6 G/DL (ref 3.4–5)
ALP SERPL-CCNC: 63 U/L (ref 33–136)
ALT SERPL W P-5'-P-CCNC: 18 U/L (ref 7–45)
ANION GAP SERPL CALC-SCNC: 14 MMOL/L (ref 10–20)
AST SERPL W P-5'-P-CCNC: 21 U/L (ref 9–39)
BASOPHILS # BLD AUTO: 0 X10*3/UL (ref 0–0.1)
BASOPHILS NFR BLD AUTO: 0 %
BILIRUB SERPL-MCNC: 0.5 MG/DL (ref 0–1.2)
BUN SERPL-MCNC: 26 MG/DL (ref 6–23)
CALCIUM SERPL-MCNC: 9 MG/DL (ref 8.6–10.6)
CHLORIDE SERPL-SCNC: 105 MMOL/L (ref 98–107)
CO2 SERPL-SCNC: 29 MMOL/L (ref 21–32)
CREAT SERPL-MCNC: 0.53 MG/DL (ref 0.5–1.05)
EOSINOPHIL # BLD AUTO: 0 X10*3/UL (ref 0–0.4)
EOSINOPHIL NFR BLD AUTO: 0 %
ERYTHROCYTE [DISTWIDTH] IN BLOOD BY AUTOMATED COUNT: 11.6 % (ref 11.5–14.5)
GFR SERPL CREATININE-BSD FRML MDRD: >90 ML/MIN/1.73M*2
GLUCOSE SERPL-MCNC: 106 MG/DL (ref 74–99)
HCT VFR BLD AUTO: 38.6 % (ref 36–46)
HGB BLD-MCNC: 12.5 G/DL (ref 12–16)
IMM GRANULOCYTES # BLD AUTO: 0.02 X10*3/UL (ref 0–0.5)
IMM GRANULOCYTES NFR BLD AUTO: 0.3 % (ref 0–0.9)
LYMPHOCYTES # BLD AUTO: 0.55 X10*3/UL (ref 0.8–3)
LYMPHOCYTES NFR BLD AUTO: 9.6 %
MCH RBC QN AUTO: 29.9 PG (ref 26–34)
MCHC RBC AUTO-ENTMCNC: 32.4 G/DL (ref 32–36)
MCV RBC AUTO: 92 FL (ref 80–100)
MONOCYTES # BLD AUTO: 0.36 X10*3/UL (ref 0.05–0.8)
MONOCYTES NFR BLD AUTO: 6.3 %
NEUTROPHILS # BLD AUTO: 4.79 X10*3/UL (ref 1.6–5.5)
NEUTROPHILS NFR BLD AUTO: 83.8 %
NRBC BLD-RTO: 0 /100 WBCS (ref 0–0)
PLATELET # BLD AUTO: 179 X10*3/UL (ref 150–450)
POTASSIUM SERPL-SCNC: 3.9 MMOL/L (ref 3.5–5.3)
PROT SERPL-MCNC: 5.4 G/DL (ref 6.4–8.2)
RBC # BLD AUTO: 4.18 X10*6/UL (ref 4–5.2)
SODIUM SERPL-SCNC: 144 MMOL/L (ref 136–145)
TSH SERPL-ACNC: 0.02 MIU/L (ref 0.44–3.98)
WBC # BLD AUTO: 5.7 X10*3/UL (ref 4.4–11.3)

## 2023-11-24 PROCEDURE — 70553 MRI BRAIN STEM W/O & W/DYE: CPT | Performed by: RADIOLOGY

## 2023-11-24 PROCEDURE — 2500000001 HC RX 250 WO HCPCS SELF ADMINISTERED DRUGS (ALT 637 FOR MEDICARE OP)

## 2023-11-24 PROCEDURE — 2550000001 HC RX 255 CONTRASTS

## 2023-11-24 PROCEDURE — 3700000002 HC GENERAL ANESTHESIA TIME - EACH INCREMENTAL 1 MINUTE

## 2023-11-24 PROCEDURE — 99100 ANES PT EXTEME AGE<1 YR&>70: CPT | Performed by: STUDENT IN AN ORGANIZED HEALTH CARE EDUCATION/TRAINING PROGRAM

## 2023-11-24 PROCEDURE — 1170000001 HC PRIVATE ONCOLOGY ROOM DAILY

## 2023-11-24 PROCEDURE — 70553 MRI BRAIN STEM W/O & W/DYE: CPT

## 2023-11-24 PROCEDURE — 84443 ASSAY THYROID STIM HORMONE: CPT | Performed by: NURSE PRACTITIONER

## 2023-11-24 PROCEDURE — 85025 COMPLETE CBC W/AUTO DIFF WBC: CPT

## 2023-11-24 PROCEDURE — 97162 PT EVAL MOD COMPLEX 30 MIN: CPT | Mod: GP

## 2023-11-24 PROCEDURE — 80053 COMPREHEN METABOLIC PANEL: CPT

## 2023-11-24 PROCEDURE — A9575 INJ GADOTERATE MEGLUMI 0.1ML: HCPCS

## 2023-11-24 PROCEDURE — 99222 1ST HOSP IP/OBS MODERATE 55: CPT | Performed by: PHYSICIAN ASSISTANT

## 2023-11-24 PROCEDURE — 2500000004 HC RX 250 GENERAL PHARMACY W/ HCPCS (ALT 636 FOR OP/ED): Performed by: ANESTHESIOLOGIST ASSISTANT

## 2023-11-24 PROCEDURE — 3700000001 HC GENERAL ANESTHESIA TIME - INITIAL BASE CHARGE

## 2023-11-24 PROCEDURE — 2500000004 HC RX 250 GENERAL PHARMACY W/ HCPCS (ALT 636 FOR OP/ED)

## 2023-11-24 PROCEDURE — 36415 COLL VENOUS BLD VENIPUNCTURE: CPT

## 2023-11-24 PROCEDURE — A70553 CHG MRI BRAIN COMBO: Performed by: ANESTHESIOLOGIST ASSISTANT

## 2023-11-24 PROCEDURE — A70553 CHG MRI BRAIN COMBO: Performed by: STUDENT IN AN ORGANIZED HEALTH CARE EDUCATION/TRAINING PROGRAM

## 2023-11-24 PROCEDURE — B030Y0Z MAGNETIC RESONANCE IMAGING (MRI) OF BRAIN USING OTHER CONTRAST, UNENHANCED AND ENHANCED: ICD-10-PCS | Performed by: PHYSICIAN ASSISTANT

## 2023-11-24 PROCEDURE — 96372 THER/PROPH/DIAG INJ SC/IM: CPT

## 2023-11-24 PROCEDURE — 92610 EVALUATE SWALLOWING FUNCTION: CPT | Mod: GN | Performed by: SPEECH-LANGUAGE PATHOLOGIST

## 2023-11-24 PROCEDURE — 97166 OT EVAL MOD COMPLEX 45 MIN: CPT | Mod: GO

## 2023-11-24 RX ORDER — PANTOPRAZOLE SODIUM 40 MG/10ML
40 INJECTION, POWDER, LYOPHILIZED, FOR SOLUTION INTRAVENOUS
Status: DISCONTINUED | OUTPATIENT
Start: 2023-11-25 | End: 2023-12-02 | Stop reason: HOSPADM

## 2023-11-24 RX ORDER — MIDAZOLAM HYDROCHLORIDE 1 MG/ML
INJECTION INTRAMUSCULAR; INTRAVENOUS
Status: DISPENSED
Start: 2023-11-24 | End: 2023-11-25

## 2023-11-24 RX ORDER — ONDANSETRON HYDROCHLORIDE 2 MG/ML
4 INJECTION, SOLUTION INTRAVENOUS ONCE AS NEEDED
OUTPATIENT
Start: 2023-11-24

## 2023-11-24 RX ORDER — ACETAMINOPHEN 325 MG/1
650 TABLET ORAL EVERY 4 HOURS PRN
OUTPATIENT
Start: 2023-11-24

## 2023-11-24 RX ORDER — HYDROMORPHONE HYDROCHLORIDE 1 MG/ML
0.2 INJECTION, SOLUTION INTRAMUSCULAR; INTRAVENOUS; SUBCUTANEOUS EVERY 5 MIN PRN
OUTPATIENT
Start: 2023-11-24

## 2023-11-24 RX ORDER — PANTOPRAZOLE SODIUM 40 MG/1
40 TABLET, DELAYED RELEASE ORAL
Status: DISCONTINUED | OUTPATIENT
Start: 2023-11-25 | End: 2023-11-24

## 2023-11-24 RX ORDER — PROPOFOL 10 MG/ML
INJECTION, EMULSION INTRAVENOUS
Status: COMPLETED
Start: 2023-11-24 | End: 2023-11-24

## 2023-11-24 RX ORDER — OXYCODONE HYDROCHLORIDE 5 MG/1
5 TABLET ORAL EVERY 4 HOURS PRN
OUTPATIENT
Start: 2023-11-24

## 2023-11-24 RX ORDER — PROPOFOL 10 MG/ML
INJECTION, EMULSION INTRAVENOUS CONTINUOUS PRN
Status: DISCONTINUED | OUTPATIENT
Start: 2023-11-24 | End: 2023-11-24

## 2023-11-24 RX ORDER — GADOTERATE MEGLUMINE 376.9 MG/ML
12 INJECTION INTRAVENOUS
Status: COMPLETED | OUTPATIENT
Start: 2023-11-24 | End: 2023-11-24

## 2023-11-24 RX ORDER — ALBUTEROL SULFATE 0.83 MG/ML
2.5 SOLUTION RESPIRATORY (INHALATION) ONCE AS NEEDED
OUTPATIENT
Start: 2023-11-24

## 2023-11-24 RX ORDER — SODIUM CHLORIDE, SODIUM LACTATE, POTASSIUM CHLORIDE, CALCIUM CHLORIDE 600; 310; 30; 20 MG/100ML; MG/100ML; MG/100ML; MG/100ML
100 INJECTION, SOLUTION INTRAVENOUS CONTINUOUS
OUTPATIENT
Start: 2023-11-24

## 2023-11-24 RX ORDER — MIDAZOLAM HYDROCHLORIDE 1 MG/ML
INJECTION, SOLUTION INTRAMUSCULAR; INTRAVENOUS AS NEEDED
Status: DISCONTINUED | OUTPATIENT
Start: 2023-11-24 | End: 2023-11-24

## 2023-11-24 RX ADMIN — ATORVASTATIN CALCIUM 10 MG: 10 TABLET, FILM COATED ORAL at 20:47

## 2023-11-24 RX ADMIN — LEVETIRACETAM 500 MG: 5 INJECTION INTRAVENOUS at 08:40

## 2023-11-24 RX ADMIN — LORAZEPAM 0.5 MG: 2 INJECTION INTRAMUSCULAR; INTRAVENOUS at 20:47

## 2023-11-24 RX ADMIN — LEVETIRACETAM 500 MG: 5 INJECTION INTRAVENOUS at 20:46

## 2023-11-24 RX ADMIN — DEXAMETHASONE SODIUM PHOSPHATE 4 MG: 4 INJECTION, SOLUTION INTRAMUSCULAR; INTRAVENOUS at 11:43

## 2023-11-24 RX ADMIN — Medication 50 MCG: at 08:18

## 2023-11-24 RX ADMIN — AMLODIPINE BESYLATE 5 MG: 5 TABLET ORAL at 08:18

## 2023-11-24 RX ADMIN — ENOXAPARIN SODIUM 40 MG: 100 INJECTION SUBCUTANEOUS at 11:45

## 2023-11-24 RX ADMIN — ASPIRIN 81 MG CHEWABLE TABLET 81 MG: 81 TABLET CHEWABLE at 08:18

## 2023-11-24 RX ADMIN — LOSARTAN POTASSIUM 50 MG: 50 TABLET, FILM COATED ORAL at 08:18

## 2023-11-24 RX ADMIN — OXYBUTYNIN CHLORIDE 5 MG: 5 TABLET ORAL at 20:47

## 2023-11-24 RX ADMIN — GABAPENTIN 300 MG: 300 CAPSULE ORAL at 20:47

## 2023-11-24 RX ADMIN — MIDAZOLAM 1 MG: 1 INJECTION INTRAMUSCULAR; INTRAVENOUS at 13:02

## 2023-11-24 RX ADMIN — OXYBUTYNIN CHLORIDE 5 MG: 5 TABLET ORAL at 08:18

## 2023-11-24 RX ADMIN — MIDAZOLAM 1 MG: 1 INJECTION INTRAMUSCULAR; INTRAVENOUS at 13:15

## 2023-11-24 RX ADMIN — GABAPENTIN 300 MG: 300 CAPSULE ORAL at 08:18

## 2023-11-24 RX ADMIN — DEXAMETHASONE SODIUM PHOSPHATE 4 MG: 4 INJECTION, SOLUTION INTRAMUSCULAR; INTRAVENOUS at 20:47

## 2023-11-24 RX ADMIN — LEVOTHYROXINE SODIUM 137 MCG: 137 TABLET ORAL at 06:00

## 2023-11-24 RX ADMIN — GADOTERATE MEGLUMINE 12 ML: 376.9 INJECTION INTRAVENOUS at 13:22

## 2023-11-24 RX ADMIN — PROPOFOL 40 MCG/KG/MIN: 10 INJECTION, EMULSION INTRAVENOUS at 13:02

## 2023-11-24 RX ADMIN — DEXAMETHASONE SODIUM PHOSPHATE 4 MG: 4 INJECTION, SOLUTION INTRAMUSCULAR; INTRAVENOUS at 06:00

## 2023-11-24 ASSESSMENT — COGNITIVE AND FUNCTIONAL STATUS - GENERAL
DRESSING REGULAR LOWER BODY CLOTHING: A LOT
HELP NEEDED FOR BATHING: A LOT
WALKING IN HOSPITAL ROOM: A LOT
TURNING FROM BACK TO SIDE WHILE IN FLAT BAD: A LOT
TOILETING: A LOT
MOVING TO AND FROM BED TO CHAIR: A LOT
EATING MEALS: A LITTLE
CLIMB 3 TO 5 STEPS WITH RAILING: TOTAL
MOVING FROM LYING ON BACK TO SITTING ON SIDE OF FLAT BED WITH BEDRAILS: A LITTLE
DRESSING REGULAR UPPER BODY CLOTHING: A LOT
MOBILITY SCORE: 12
DAILY ACTIVITIY SCORE: 13
STANDING UP FROM CHAIR USING ARMS: A LOT
PERSONAL GROOMING: A LOT

## 2023-11-24 ASSESSMENT — ACTIVITIES OF DAILY LIVING (ADL)
ADL_ASSISTANCE: NEEDS ASSISTANCE
ADL_ASSISTANCE: NEEDS ASSISTANCE
BATHING_ASSISTANCE: MODERATE

## 2023-11-24 ASSESSMENT — PAIN - FUNCTIONAL ASSESSMENT
PAIN_FUNCTIONAL_ASSESSMENT: 0-10

## 2023-11-24 ASSESSMENT — PAIN SCALES - GENERAL
PAINLEVEL_OUTOF10: 0 - NO PAIN
PAIN_LEVEL: 0
PAINLEVEL_OUTOF10: 0 - NO PAIN
PAINLEVEL_OUTOF10: 0 - NO PAIN

## 2023-11-24 NOTE — PROGRESS NOTES
Speech-Language Pathology    Inpatient Speech-Language Pathology Clinical Swallow Evaluation    Patient Name: Valorie Montelongo  MRN: [id number]  Today's Date: 11/24/2023   Time Calculation  Start Time: 1506  Stop Time: 1535  Time Calculation (min): 29 min       History:   Valorie Montelongo is a 77 y/o F w h/o metastatic lung cancer (s/p VATS RAYRAY and LLL resection at  11/18/21 but now follows with oncology at [facility]), h/o papillary thyroid cancer (s/p thyroidectomy 7/2021), oral cancer (s/p resection and XRT), HTN, HLD, NSTEMI 2/2 pneumothorax (11/25/21), COPD (on 2L), GERD, and anxiety who presents as a transfer from [facility] for neurosurgery evaluation as CT head at OSH showed new metastatic brain lesions.     Recommendations:  Solid Diet Recommendations : NPO  Liquid Diet Recommendations: NPO    - Frequent, aggressive oral care is strongly recommended to improve infection control as well as reduce dental plaque and bacteria on oropharyngeal surfaces which may increase the risk nosocomial infections, including pneumonia.  - OK for small amounts of ice chips/sips of water (one at a time, 10x/hour) for pleasure/swallow stimulation, but only after aggressive oral care to avoid colonization of bacteria within oral cavity.      Assessment:  Clinical swallow evaluation completed. Pt alert and oriented x3, however knows she's in the hospital, thinks she's at [facility]. Oral musculature functional, edentulous status. Adequate manipulation of ice chips. Initially tolerated small single sips of water, however with large consecutive sips x3 strong post prandial cough occurred. Suspect pharyngeal dysphagia therefore deferred further PO trials. Pt will need an instrumental exam prior to any PO recommendation.  Discussed results and recommendations with RN and MD.       Plan:  SLP Plan: Skilled SLP  SLP Frequency: 1x per week  Duration: 3 weeks  Discussed POC: Patient, Caregiver/family, Nursing,  Physician  Patient/Caregiver Agreeable: Yes  SLP - OK to Discharge: Yes    Goals:   Pt. will tolerate least restrictive diet without overt s/s of aspiration with 100% accuracy.        Pain:  Pain Assessment: 0-10  Pain Score: 0 - No pain       Oral/Motor Assessment:  Dentition: Edentulous  Oral Motor: Within Functional Limits      Inpatient:  Education Documentation  Extensive education provided to pt and  re: current swallow function, recommendations/results and dysphagia POC.

## 2023-11-24 NOTE — PROGRESS NOTES
Physical Therapy    Physical Therapy Evaluation    Patient Name: Valorie Montelongo  MRN: [id number]  Today's Date: 11/24/2023   In Time: 0926  Out Time: 0942    Assessment/Plan   PT Assessment  PT Assessment Results: Decreased strength, Decreased range of motion, Decreased endurance, Decreased mobility, Impaired balance, Decreased cognition, Decreased safety awareness  Rehab Prognosis: Good  End of Session Communication: Bedside nurse  Assessment Comment: Patient with fair tolerance to PT evaluation. Patient demonstrates impairments with functional mobility, strength, and endurance at this time. Patient currently not safe to return home, recommend moderate intensity of therapy at discharge.  End of Session Patient Position: Alarm on, Bed, 3 rail up ( present)  IP OR SWING BED PT PLAN  Inpatient or Swing Bed: Inpatient  PT Plan  PT Plan: Skilled PT  PT Frequency: 3 times per week  PT Discharge Recommendations: Moderate intensity level of continued care  PT - OK to Discharge: Yes      Subjective   General Visit Information:  General  Reason for Referral:  (weakness/ gait instability and  Metastatic adenocarcinoma (CMS/HCC))  Past Medical History Relevant to Rehab: 8 y.o. female with h/o HTN HLD NSTEMI 2/2 pneumothorax (11/25/21), lung adenocarinoma s/p L VATS, RAYRAY and LLL wedge resection (11/18/21), COPD (on 2L), oral cancer s/p resection and rads, GERD, papillary thyroid cancer s/p thyroidectomy 7/2021, hypothyroidism OAB, chronic pain syndrome, anxiety  Family/Caregiver Present: Yes ( present)  Co-Treatment: OT  Co-Treatment Reason: cotx done for safety d/t increased weakness/ gait instability over last 10 daays and medical dx of brain mets  Prior to Session Communication: Bedside nurse  Patient Position Received: Bed, 3 rail up, Alarm on (IVs intact, 3L NC)  Home Living:  Home Living  Type of Home: House  Lives With: Spouse  Home Adaptive Equipment: Walker rolling or standard  Home Layout: One level  Home  Access: Level entry  Prior Level of Function:  Prior Function Per Pt/Caregiver Report  Level of [place]: Needs assistance with homemaking, Needs assistance with functional transfers  Receives Help From: Family  ADL Assistance: Needs assistance  Ambulatory Assistance: Needs assistance  Prior Function Comments: Per , pt requires assistance with mobility using a rolling walker at baseline  Precautions:  Precautions  Medical Precautions: Seizure precautions, Fall precautions    Objective   Pain:  Pain Assessment  Pain Assessment: 0-10  Pain Score: 0 - No pain  Cognition:  Cognition  Overall Cognitive Status: Impaired (Pt with decreased awareness to situation, diffuclty with home set up/PLOF.  present to assist with PLOF questions)  Orientation Level:  (oriented to self and date; disoriented to situation)  Processing Speed: Delayed    General Assessments:    Strength  Strength Comments: grossly 3/5 throughout BLEs    Static Sitting Balance  Static Sitting-Level of Assistance: Minimum assistance      Functional Assessments:    Bed Mobility  Bed Mobility: Yes  Bed Mobility 1  Bed Mobility 1: Side lying left to sit  Level of Assistance 1: Moderate assistance  Bed Mobility Comments 1: verbal and manual cues for ininitiation of task  Bed Mobility 2  Bed Mobility  2: Sitting to supine  Level of Assistance 2: Moderate assistance  Bed Mobility Comments 2: verbal/manual cues for task initiation    Transfers  Transfer: Yes  Transfer 1  Transfer From 1: Stand to, Sit to  Transfer to 1: Stand, Sit  Technique 1: Sit to stand, Stand to sit  Transfer Device 1: Walker  Transfer Level of Assistance 1: Moderate assistance (x2)  Trials/Comments 1: Pt performed STS x 2 with mod assist x1 and verbal/tactile cues for task initiation and proper technique. pt reporting dizziness/weakness requiring return to sit    Ambulation/Gait Training  Ambulation/Gait Training Performed: No    Stairs  Stairs: No      Outcome  Measures:  [facility] Basic Mobility  Turning from your back to your side while in a flat bed without using bedrails: A little  Moving from lying on your back to sitting on the side of a flat bed without using bedrails: A lot  Moving to and from bed to chair (including a wheelchair): A lot  Standing up from a chair using your arms (e.g. wheelchair or bedside chair): A lot  To walk in hospital room: A lot  Climbing 3-5 steps with railing: Total  Basic Mobility - Total Score: 12    Encounter Problems       Encounter Problems (Active)       Mobility       STG - Patient will ambulate 20 ft with LRAD and mod assist       Start:  11/24/23    Expected End:  12/08/23               Transfers       STG - Transfer from bed to chair with LRAD and mod assist       Start:  11/24/23    Expected End:  12/08/23            STG - Patient will perform bed mobility with CGA       Start:  11/24/23    Expected End:  12/08/23                   Education Documentation  Mobility Training, taught by Funmilayo Kamara PT at 11/24/2023 10:20 AM.  Learner: Family, Patient  Readiness: Acceptance  Method: Explanation  Response: Verbalizes Understanding    Education Comments  No comments found.

## 2023-11-24 NOTE — PROGRESS NOTES
"Valorie Montelongo is a 78 y.o. female on day 1 of admission presenting with Metastatic adenocarcinoma (CMS/HCC).    Subjective   She is anxious for MRI today because she is nervous about the anesthesia, also is hungry and wants to know when she can eat. We discussed NPO and SLP thai. Her  José Manuel is at bedside.        Objective     Physical Exam  Vitals reviewed.   Constitutional:       Appearance: Normal appearance.   HENT:      Head: Normocephalic and atraumatic.      Nose: Nose normal.      Mouth/Throat:      Mouth: Mucous membranes are moist.      Pharynx: Oropharynx is clear.      Comments: dentures  Eyes:      Extraocular Movements: Extraocular movements intact.      Pupils: Pupils are equal, round, and reactive to light.   Cardiovascular:      Rate and Rhythm: Normal rate and regular rhythm.      Pulses: Normal pulses.      Heart sounds: Normal heart sounds.   Pulmonary:      Effort: Pulmonary effort is normal.      Breath sounds: Normal breath sounds.   Abdominal:      General: Bowel sounds are normal.      Palpations: Abdomen is soft.   Musculoskeletal:         General: Normal range of motion.   Skin:     General: Skin is warm.   Neurological:      General: No focal deficit present.      Mental Status: She is alert and oriented to person, place, and time. Mental status is at baseline.   Psychiatric:         Mood and Affect: Mood normal.         Behavior: Behavior normal.       Last Recorded Vitals  Blood pressure 178/87, pulse 74, temperature 36.9 °C (98.4 °F), temperature source Tympanic, resp. rate 22, height 1.626 m (5' 4\"), weight 62.6 kg (138 lb), SpO2 97 %.  Intake/Output last 3 Shifts:  I/O last 3 completed shifts:  In: - (0 mL/kg)   Out: 50 (0.8 mL/kg) [Urine:50 (0 mL/kg/hr)]  Weight: 62.6 kg        Assessment/Plan   Principal Problem:    Metastatic adenocarcinoma (CMS/HCC)    Valorie Montelongo is a 77 y/o F w h/o metastatic lung cancer (s/p VATS RAYRAY and LLL resection at  11/18/21 but now follows " with oncology at [facility]), h/o papillary thyroid cancer (s/p thyroidectomy 7/2021), oral cancer (s/p resection and XRT), HTN, HLD, NSTEMI (11/25/21), COPD (on 2-3L at baseline), GERD, and anxiety who presented as a transfer from [facility] for neurosurgery evaluation as CT head at OSH showed new metastatic brain lesions. Patient has been experiencing b/l LE weakness, gait changes and speech difficulties x 10 days, and have progressively gotten worse. MRI brain without contrast (11/23) showed multiple brain metastases with surrounding vasogenic edema. MRI whole spine (11/23) without evidence of spinal metastasis or cord compression. NSGY consulted on admit, rec no acute surgical intervention and rec to consult Rad Onc. Started on dexamethasone 4mg q6h + keppra 500mg BID per NSGY, also rec'd MRI brain with and without contrast which is pending 11/24 (pt requires anesthesia). SLP consulted and recs pending in setting of dysphagia x months. Rad Onc consulted 11/24 and recs pending.      # New brain metastases / bilateral weakness   - hx of metastatic lung cancer and metastatic papillary thyroid cancer; experiencing progressive b/l LE weakness, speech and gait changes  - OSH 11/22/23 CT head shows multiple masses and vasogenic edema- patient transferred to [facility] for further evaluation   - CT whole spine without contrast (11/23/23): No CT evidence of metastatic disease in the spine, degernative changes and scoliosis noted   - MRI whole spine without contrast (11/23/23): no lesions noted within the cervical, thoracic or lumbar spine, no significant spinal canal or neural foraminal stenosis   - MRI brain without contrast (11/23/23): numerus cerebral lesions scattered throughout the b/l cerebral hemispheres with significant sorrouning vasogenic edema, components of hemorrhage - however exam is somewhat limited due to non-contrast examination   - MRI brain with and without contrast under anesthesia (unable  to tolerate previous 2 attempts) 11/24 pending   - NSGY consulted  on admit - rec no acute surgical intervention and Rad Onc consult, start dex + keppra + MRI brain w/wo   - (11/23- current) continue dexamethasone 4mg q6h + PPI support per neurosurgery recs - currently IV, plan to transition to PO pending SLP eval   - (11/23- current) continue Keppra 500mg BID per neurosurgery recs- currently IV, plan to transition to PO pending SLP eval   - Q4 neuro checks   - PT/OT consulted- recs pending   - Rad Onc consulted 11/24 - waiting on MRI brain w/wo for recs      # lung adenocarcinoma    - follows with Dr. David Mathias at [facility] - emailed about admission and above radiographic findings 11/23   - s/p VATs RAYRAY and LLL resection at  on 11/18/2021   - oncological history limited as community records have limited oncologic information - per patient and , pt has never received chemotherapy   - CT chest with contrast (11/23): increased soft tissue thickening in the left thyoid surgical bed with probable involvement of the left cervical lymph node, dozens of bilateral pulmonary nodules of which a few are new from previous CT (11/2021), new enlargement of aortopulmonary window lymph nose and right interlobar lymph nodes suspicious for metastatic disease      # H/o papillary thyroid cancer / oral cancer / acquired hypothyroidism   - s/p s/p thyroidectomy 7/2021  - TSH level pending 11/24  - Continue with home levothyroxine 137 mcg daily before breakfast     # progressive dysphagia   - SLP consulted on admission I/s/o new brain mets and occasional reported dysphagia- recs pending   - available meds transitioned to IV, can transition back to PO once SLP recs complete     # HTN  - continue with home amlodipine 5mg daily, home Irbesartan substituted for losartan 50mg daily while inpatient      # CAD with H /o NSTEMI  - NSTEMI 11/25/2021   - continue with home atorvastatin 10mg nightly and daily ASA     # COPD  - former smoker,  baseline oxygen continuous 2-3L at home   - continue with home Ipratropium 21mcg 2 sprays TID PRN shortness of breath   - continue with home albuterol 90mcg q6 hours PRN wheezing      # chronic back pain   - CT spine and MRI without contrast (11/23/23): without evidence of spinal metastasis or cord compression   - continue home gabapentin 300mg TID, Norco 5-325mg q4 hours PRN moderate pain, flexeril 10mg TID      # Overreactive bladder   - Home Toviaz substituted for oxybutynin 5mg BID while inpatient      # Anxiety   - continue with home ativan PRN anxiety      # DVT prophy / misc  - hold prophy Lovenox with MRI brain without contrast suspicious components of hemorrhage  - PPI support with dexamethasone      #Dispo   - FULL CODE, confirmed on admission   - Primary oncologist: Dr. Mathias - notified of admission 11/23   - DC pending MRI brain with contrast, NSGY and Rad Onc recs, SLP recs, PT/OT recs   -  José Manuel: [phone] - updated at bedside 11/24       I spent > 60 minutes in the professional and overall care of this patient.      Georgina Copeland, APRN-CNP

## 2023-11-24 NOTE — PROGRESS NOTES
"Valorie Montelongo is a 78 y.o. female with h/o HTN HLD NSTEMI 2/2 pneumothorax (11/25/21), lung adenocarinoma s/p L VATS, RAYRAY and LLL wedge resection (11/18/21), COPD (on 2L), oral cancer s/p resection and rads, GERD, papillary thyroid cancer s/p thyroidectomy 7/2021, hypothyroidism OAB, chronic pain syndrome, anxiety p/w weakness and gait instability    Subjective   No events overnight     Objective     Physical Exam  AOx3  R 4, drift, clonus  L 5  SILT    Last Recorded Vitals  Blood pressure 169/77, pulse 80, temperature 36.3 °C (97.4 °F), temperature source Temporal, resp. rate 18, height 1.626 m (5' 4\"), weight 62.6 kg (138 lb), SpO2 97 %.  Intake/Output last 3 Shifts:  No intake/output data recorded.    Relevant Results  MR brain w and wo IV contrast 11/23/2023    Narrative  FINDINGS:  The postcontrast images are markedly motion degraded and essentially  nondiagnostic. The multiple parenchymal lesions demonstrated on the  examination from earlier on the same date are again noted but are  inadequately evaluated due to motion degradation. There is again  extensive associated vasogenic edema as well as mass effect and  midline shift, unchanged from the previous exam.    Impression  Markedly motion degraded and essentially nondiagnostic postcontrast  images of the brain.      Assessment/Plan   Principal Problem:    Metastatic adenocarcinoma (CMS/HCC)    Valorie Montelongo is a 78 y.o. female with h/o HTN HLD NSTEMI 2/2 pneumothorax (11/25/21), lung adenocarinoma s/p L VATS, RAYRAY and LLL wedge resection (11/18/21), COPD (on 2L), oral cancer s/p resection and rads, GERD, papillary thyroid cancer s/p thyroidectomy 7/2021, hypothyroidism OAB, chronic pain syndrome, anxiety p/w weakness and gait instability, CTH LF mass, 11/23 MRI neuroaxis EF 1.5x1.5cm, LF 1x1cm, and 2x2cm lesions, R parietal 1x1cm lesion     Stokes primary  Dex 4q6, PPI, SSI   Keppra 500 BID seizure prophylaxis   MRI brain w/wo with out of OR anesthesia, will " follow for imaging  Will arrange gamma knife referral and tumor board   Please document prognosis   SCDs, LVX             Miguelito Dozier MD

## 2023-11-24 NOTE — ANESTHESIA PREPROCEDURE EVALUATION
Patient: Valorie Montelongo    Principal Problem:    Metastatic adenocarcinoma (CMS/HCC)     Valorie Montelongo is a 79 y/o F w h/o metastatic lung cancer (s/p VATS RAYRAY and LLL resection at  11/18/21 but now follows with oncology at [facility]), h/o papillary thyroid cancer (s/p thyroidectomy 7/2021), oral cancer (s/p resection and XRT), HTN, HLD, NSTEMI (11/25/21), COPD (on 2-3L at baseline), GERD, and anxiety who presented as a transfer from [facility] for neurosurgery evaluation as CT head at OSH showed new metastatic brain lesions. Patient has been experiencing b/l LE weakness, gait changes and speech difficulties x 10 days, and have progressively gotten worse. MRI brain without contrast (11/23) showed multiple brain metastases with surrounding vasogenic edema. MRI whole spine (11/23) without evidence of spinal metastasis or cord compression. NSGY consulted on admit, rec no acute surgical intervention and rec to consult Rad Onc. Started on dexamethasone 4mg q6h + keppra 500mg BID per NSGY, also rec'd MRI brain with and without contrast which is pending 11/24 (pt requires anesthesia). SLP consulted and recs pending in setting of dysphagia x months. Rad Onc consulted 11/24 and recs pending.        Procedure Information       Date/Time: 11/24/23 1115    Procedure: MR BRAIN W AND WO CONTRAST    Location: [facility]            Relevant Problems   Anesthesia (within normal limits)      Cardiovascular   (+) HTN (hypertension)   (+) Hyperlipidemia   (+) MI (myocardial infarction) (CMS/HCC)      Endocrine  Thyroid ca s/p thyroidectomy      GI   (+) Gastroesophageal reflux disease      Neuro/Psych  Mets to the brain   (+) Anxiety      Pulmonary  S/p VATS and RAYRAY and LLL resection 11/18/21   (+) Chronic obstructive pulmonary disease (CMS/HCC)   (+) History of home oxygen therapy   (+) Pulmonary cancer (CMS/HCC)      Eyes, Ears, Nose, and Throat  Oral ca s/p resection and XRT       Clinical  information reviewed:   Tobacco  Allergies  Meds  Problems  Med Hx  Surg Hx   Fam Hx  Soc   Hx        NPO Detail:  Midnight  Vitals:    11/24/23 0857   BP: 178/87   Pulse: 74   Resp: 22   Temp: 36.9 °C (98.4 °F)   SpO2: 97%          Physical Exam    Airway  Mallampati: II  TM distance: >3 FB     Cardiovascular - normal exam     Dental   (+) upper dentures     Pulmonary - normal exam     Abdominal      Other findings: Upper full dentures, no teeth on the bottom          Anesthesia Plan    ASA 3     MAC     Anesthetic plan and risks discussed with patient.    Plan discussed with CAA and attending.

## 2023-11-24 NOTE — CARE PLAN
Problem: Skin  Goal: Decreased wound size/increased tissue granulation at next dressing change  Outcome: Progressing  Flowsheets (Taken 11/24/2023 1539)  Decreased wound size/increased tissue granulation at next dressing change:   Promote sleep for wound healing   Protective dressings over bony prominences  Goal: Participates in plan/prevention/treatment measures  Outcome: Progressing  Flowsheets (Taken 11/24/2023 1539)  Participates in plan/prevention/treatment measures:   Discuss with provider PT/OT consult   Elevate heels  Goal: Prevent/manage excess moisture  Outcome: Progressing  Flowsheets (Taken 11/24/2023 1539)  Prevent/manage excess moisture:   Monitor for/manage infection if present   Cleanse incontinence/protect with barrier cream  Goal: Prevent/minimize sheer/friction injuries  Outcome: Progressing  Flowsheets (Taken 11/24/2023 1539)  Prevent/minimize sheer/friction injuries:   Turn/reposition every 2 hours/use positioning/transfer devices   Use pull sheet  Goal: Promote/optimize nutrition  Outcome: Progressing  Flowsheets (Taken 11/24/2023 1539)  Promote/optimize nutrition:   Monitor/record intake including meals   Offer water/supplements/favorite foods  Goal: Promote skin healing  Outcome: Progressing  Flowsheets (Taken 11/24/2023 1539)  Promote skin healing:   Assess skin/pad under line(s)/device(s)   Protective dressings over bony prominences   Turn/reposition every 2 hours/use positioning/transfer devices     Problem: Fall/Injury  Goal: Not fall by end of shift  Outcome: Progressing  Goal: Be free from injury by end of the shift  Outcome: Progressing  Goal: Verbalize understanding of personal risk factors for fall in the hospital  Outcome: Progressing  Goal: Verbalize understanding of risk factor reduction measures to prevent injury from fall in the home  Outcome: Progressing  Goal: Use assistive devices by end of the shift  Outcome: Progressing  Goal: Pace activities to prevent fatigue by end of  the shift  Outcome: Progressing

## 2023-11-24 NOTE — PROGRESS NOTES
Occupational Therapy    Evaluation    Patient Name: Valorie Montelongo  MRN: [id number]  Today's Date: 11/24/2023  Time Calculation  Start Time: 0924  Stop Time: 0943  Time Calculation (min): 19 min    Assessment  IP OT Assessment  OT Assessment: difficulty with I/ADLs, fxnl mob, safety  Prognosis: Good  Evaluation/Treatment Tolerance: Patient limited by fatigue  Medical Staff Made Aware: Yes  End of Session Communication: Bedside nurse  End of Session Patient Position: Bed, 3 rail up, Alarm off, caregiver present  Plan:  Treatment Interventions: ADL retraining, Functional transfer training, UE strengthening/ROM, Endurance training, Equipment evaluation/education, Patient/family training, Fine motor coordination activities  OT Frequency: 3 times per week  OT Discharge Recommendations: Moderate intensity level of continued care  OT Recommended Transfer Status: Moderate assist, Assist of 2  OT - OK to Discharge: Yes    Subjective   Current Problem:  1. Metastatic adenocarcinoma (CMS/HCC)  Tumor Board Request      2. Chronic bronchitis, unspecified chronic bronchitis type (CMS/HCC)  albuterol 2.5 mg /3 mL (0.083 %) nebulizer solution        General:  General  Reason for Referral: anxiety p/w weakness and gait instability, CTH LF mass, 11/23 MRI neuroaxis EF 1.5x1.5cm, LF 1x1cm, and 2x2cm lesions, R parietal 1x1cm lesion  Past Medical History Relevant to Rehab: HTN HLD NSTEMI 2/2 pneumothorax (11/25/21), lung adenocarinoma s/p L VATS, RAYRAY and LLL wedge resection (11/18/21), COPD (on 2L), oral cancer s/p resection and rads, GERD, papillary thyroid cancer s/p thyroidectomy 7/2021, hypothyroidism OAB, chronic pain syndrome, anxiety  Family/Caregiver Present: Yes  Caregiver Feedback:  present  Co-Treatment: PT  Co-Treatment Reason: 2/2 maximize pt safety  Prior to Session Communication: Bedside nurse  Patient Position Received: Bed, 2 rail up  Precautions:  Medical Precautions: Fall precautions, Seizure precautions  Vital  Signs:     Pain:  Pain Assessment  Pain Assessment: 0-10  Pain Score: 0 - No pain    Objective   Cognition:  Overall Cognitive Status: Impaired  Arousal/Alertness: Delayed responses to stimuli  Following Commands: Follows one step commands with repetition  Safety Judgment: Decreased awareness of need for assistance  Problem Solving: Assistance required to identify errors made  Memory: Exceptions to WFL  Long-Term Memory: Impaired  Short-Term Memory: Impaired  Working Memory: Impaired  Problem Solving: Exceptions to [facility]  Complex Functional Tasks: Impaired  Simple Functional Tasks: Impaired  Verbal Reasoning Skills: Impaired  Abstract Reasoning: Exceptions to WFL  Abstract Thinking Not Consistent: Moderate  Divergent Thinking: Impaired  Safety/Judgement: Exceptions to WF  Complex Functional Tasks: Moderate  Routine Tasks: Moderate  Unable to Self-Monitor and Self-Correct Consistently: Moderate  Insight: Moderate  Task Initiation: Delayed initiation  Planning: Reduced planning skills  Organization: Moderately disorganized  Processing Speed: Delayed           Home Living:  Type of Home: House  Lives With: Spouse  Home Adaptive Equipment:  (WhW)  Home Layout: One level  Home Access: Level entry  Bathroom Shower/Tub: Tub/shower unit   Prior Function:  Level of [place]: Needs assistance with ADLs, Needs assistance with homemaking, Needs assistance with functional transfers  Receives Help From:  (spouse)  ADL Assistance: Needs assistance  Bath:  (A sponge bathing)  Homemaking Assistance: Needs assistance  Ambulatory Assistance: Needs assistance  Hand Dominance: Right  IADL History:  IADL Comments: A driving and IADLs , +dog, retired  ADL:  Eating Assistance:  (min A anticipated)  Grooming Assistance:  (mod A anticipated standing)  Bathing Assistance: Moderate  UE Dressing Assistance: Moderate  LE Dressing Assistance: Maximal  Toileting Assistance with Device:  (mod A anticipated whW)  Functional Deficit: Setup,  Steadying, Verbal cueing, Supervision/safety, Increased time to complete  Activity Tolerance:  Endurance:  (fair)  Bed Mobility/Transfers: Bed Mobility  Bed Mobility: Yes  Bed Mobility 1  Bed Mobility 1:  (sup/sit mod A)    Transfers  Transfer: Yes  Transfer 1  Transfer From 1:  (sit/stand 2x, mod A x2 WhW extended rest break required)    IADL's:   IADL Comments: A driving and IADLs , +dog, retired  Vision: Vision - Basic Assessment  Current Vision: Wears glasses only for reading  Sensation:  Light Touch: No apparent deficits  Strength:  Strength Comments: RUE shoulder flex 3-/5, distal 3+/5, LUE 3+/5       Coordination:  Movements are Fluid and Coordinated: Yes   Hand Function:  Hand Function  Gross Grasp: Functional  Extremities: RUE   RUE : Within Functional Limits and LUE   LUE: Within Functional Limits      Outcome Measures: [facility] Daily Activity  Putting on and taking off regular lower body clothing: A lot  Bathing (including washing, rinsing, drying): A lot  Putting on and taking off regular upper body clothing: A lot  Toileting, which includes using toilet, bedpan or urinal: A lot  Taking care of personal grooming such as brushing teeth: A lot  Eating Meals: A little  Daily Activity - Total Score: 13         and OT Adult Other Outcome Measures  4AT: +  Education Documentation  Body Mechanics, taught by Vale Lopez OT at 11/24/2023 10:48 AM.  Learner: Patient  Readiness: Acceptance  Method: Explanation  Response: Verbalizes Understanding, Needs Reinforcement    Precautions, taught by Vale Lopez OT at 11/24/2023 10:48 AM.  Learner: Patient  Readiness: Acceptance  Method: Explanation  Response: Verbalizes Understanding, Needs Reinforcement    ADL Training, taught by Vale Lopez OT at 11/24/2023 10:48 AM.  Learner: Patient  Readiness: Acceptance  Method: Explanation  Response: Verbalizes Understanding, Needs Reinforcement    Education Comments  No comments found.      Goals:   Encounter  Problems       Encounter Problems (Active)       ADLs       Patient will perform UB and LB bathing  with minimal assist  level of assistance and AE. (Progressing)       Start:  11/24/23    Expected End:  12/15/23            Patient with complete upper body dressing with stand by assist level of assistance donning and doffing all UE clothes with no adaptive equipment  (Progressing)       Start:  11/24/23    Expected End:  12/15/23            Patient with complete lower body dressing with minimal assist  level of assistance donning and doffing all LE clothes  with PRN adaptive equipment  (Progressing)       Start:  11/24/23    Expected End:  12/15/23            Patient will feed self with independent level of assistance and verbal cues using PRN adaptive equipment. (Progressing)       Start:  11/24/23    Expected End:  12/15/23            Patient will complete daily grooming tasks  with stand by assist level of assistance and PRN adaptive equipment. (Progressing)       Start:  11/24/23    Expected End:  12/15/23            Patient will complete toileting including hygiene clothing management/hygiene with minimal assist  level of assistance and LRD. (Progressing)       Start:  11/24/23    Expected End:  12/15/23               COGNITION/SAFETY       Patient will score WFL on standardized cognitive assessment with min cues and within reasonable time frame (Progressing)       Start:  11/24/23    Expected End:  12/15/23               EXERCISE/STRENGTHENING       Patient will complete BUE exercises for 3 sets and 10 reps in order to improve strength and activity for ADL performance.  (Progressing)       Start:  11/24/23    Expected End:  12/15/23               MOBILITY       Patient will perform Functional mobility min Household distances/Community Distances with minimal assist  level of assistance and least restrictive device in order to improve safety and functional mobility. (Progressing)       Start:  11/24/23     Expected End:  12/15/23                 TRANSFERS       Patient will perform bed mobility contact guard assist level of assistance and bed rails in order to improve safety and independence with mobility (Progressing)       Start:  11/24/23    Expected End:  12/15/23            Patient will complete functional transfers with least restrictive device with minimal assist  level of assistance. (Progressing)       Start:  11/24/23    Expected End:  12/15/23              Vale CHAMBERS, OTR/L

## 2023-11-24 NOTE — ANESTHESIA POSTPROCEDURE EVALUATION
Patient: Valorie Montelongo    Procedure Summary       Date: 11/24/23 Room / Location: [facility]    Anesthesia Start: 1300 Anesthesia Stop: 1345    Procedure: MR BRAIN W AND WO CONTRAST Diagnosis: (patient unable to tolerate 2 attempts of MRI brain with pre-medication)    Scheduled Providers:  Responsible Provider: Catalina Gomez MD MPH    Anesthesia Type: MAC ASA Status: 3            Anesthesia Type: MAC    Vitals Value Taken Time   /69 11/24/23 1345   Temp 36 11/24/23 1345   Pulse 58 11/24/23 1345   Resp 16 11/24/23 1345   SpO2 100 11/24/23 1345       Anesthesia Post Evaluation    Patient location during evaluation: PACU  Patient participation: complete - patient participated  Level of consciousness: awake  Pain score: 0  Pain management: adequate  Airway patency: patent  Cardiovascular status: acceptable  Respiratory status: acceptable  Hydration status: acceptable  Postoperative Nausea and Vomiting: none  Comments: Patient comfortable      No notable events documented.

## 2023-11-24 NOTE — CONSULTS
Radiation Oncology Inpatient Consult    Patient Name:  Valorie Montelongo  MRN:  [id number]  :  1945    Referring Provider: Alley Quiles  Primary Care Provider: Kanu De Luna MD  Care Team: Patient Care Team:  Kanu De Luna MD as PCP - General    Date of Service: 2023     SUBJECTIVE  History of Present Illness:  This is a 78-year-old female with multiple chronic comorbidities including a history of locally advanced papillary thyroid cancer, left lung adenocarcinoma, and oral cancer.  She presents to [facility] emergency department on  with a 10-day history of progressive weakness, confusion, with changes in gait and speech.  Noncontrast MRI reported findings of multiple metastatic appearing lesions.  The patient was transferred to [facility] for further management.  She has been provided with dexamethasone.  The patient has been seen by neurosurgical services, and radiation oncology has been asked to assist with potential care coordination.    Initial attempts at MRI brain with contrast were unsuccessful.  Repeat studies were obtained today with anesthesia.  Approximately 13 enhancing lesions are seen within the cerebral hemispheres, the largest measuring approximately 2.3 x 2.2 cm involving the left frontal lobe, with associated vasogenic edema and mass effect.  There is no significant midline shift.    Today, the patient continues to complain of changes in cognition, specifically her memory.  She is also experiencing right-sided weakness, primarily in her right leg.  Her significant other is at bedside and able to provide some collateral.  He endorses a gradual decline over 10 days.  Prior to this, the patient is described as being ambulatory and capable of all self-care.       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  Her treatment center was  mentioned to be in [place], [place].  They are unable to recall details regarding her treatment schedule.  She presented in 2021 with papillary thyroid cancer invading her trachea s/p resection with positive margins.  She was additionally found to have stage I adenocarcinoma of the left lung which was treated with wedge resection.  She has since been taking an oral medication for her thyroid cancer which was recently discontinued.  They also state that the patient was recently found to have a new suspicious left lung lesion, concerning for recurrent lung cancer.  She currently receives her oncologic care at [facility].      Prior Radiotherapy:  yes  Treated for salivary gland cancer over 20 years ago with surgery and adjuvant radiotherapy, the details of which are unknown.    Current Systemic Treatment:  No    Presence of Pacemaker or ICD:  No    Past Medical History:  Salivary gland cancer, thyroid cancer, lung cancer,  COPD with chronic respiratory failure, tobacco abuse, PNA w/ sepsis, PTX, HTN, anxiety, GERD, HLD, MARIBEL     Past Surgical History:     Salivary gland resection, VATs, bronchoscopy, wedge resection of LLL and RAYRAY, cataract, cholecystectomy, hysterectomy, thyroidectomy, oral surgery for carcinoma, PEG    Family History:    mother with breast cancer, brother with liver cancer    Social History:   long time smoker, recently quit, lives with her significant other of many years    Allergies:    Allergies   Allergen Reactions    Fluoxetine Insomnia    Levofloxacin Itching and Rash        Medications:    Current Facility-Administered Medications:     albuterol 2.5 mg /3 mL (0.083 %) nebulizer solution 2.5 mg, 2.5 mg, nebulization, q6h PRN, Triny Mao PA-C    alteplase (Cathflo Activase) injection 2 mg, 2 mg, intra-catheter, PRN, Triny Mao PA-C    amLODIPine (Norvasc) tablet 5 mg, 5 mg, oral, Daily, Triny Mao PA-C, 5 mg at 11/24/23 0818    aspirin chewable tablet 81 mg, 81 mg,  oral, Daily, Triny Mao PA-C, 81 mg at 11/24/23 0818    atorvastatin (Lipitor) tablet 10 mg, 10 mg, oral, Nightly, Triny Mao PA-C    cholecalciferol (Vitamin D-3) tablet 50 mcg, 50 mcg, oral, Daily, Triny Mao PA-C, 50 mcg at 11/24/23 0818    cyclobenzaprine (Flexeril) tablet 10 mg, 10 mg, oral, TID PRN, Triny Mao PA-C    dexAMETHasone (Decadron) injection 4 mg, 4 mg, intravenous, q6h, Triny Mao PA-C, 4 mg at 11/24/23 1143    docusate sodium (Colace) capsule 100 mg, 100 mg, oral, PRN, Triny Mao PA-C    [Held by provider] enoxaparin (Lovenox) syringe 40 mg, 40 mg, subcutaneous, q24h, Triny Mao PA-C, 40 mg at 11/24/23 1145    gabapentin (Neurontin) capsule 300 mg, 300 mg, oral, TID, Triny Mao PA-C, 300 mg at 11/24/23 0818    HYDROcodone-acetaminophen (Norco) 5-325 mg per tablet 2 tablet, 2 tablet, oral, q4h PRN, Triny Mao PA-C    ipratropium (Atrovent) 21 mcg (0.03 %) nasal spray 2 spray, 2 spray, Each Nostril, TID PRN, Triny Mao PA-C    levETIRAcetam in NaCl (iso-os) (Keppra)  mg, 500 mg, intravenous, q12h, Triny Mao PA-C, Stopped at 11/24/23 0855    levothyroxine (Synthroid, Levoxyl) tablet 137 mcg, 137 mcg, oral, Daily before breakfast, Triny Mao PA-C, 137 mcg at 11/24/23 0600    LORazepam (Ativan) injection 0.5 mg, 0.5 mg, intravenous, Daily PRN, Triny Mao PA-C    [Held by provider] LORazepam (Ativan) tablet 1 mg, 1 mg, oral, Daily PRN, Triny Mao PA-C    losartan (Cozaar) tablet 50 mg, 50 mg, oral, Daily, Triny Mao PA-C, 50 mg at 11/24/23 0818    midazolam (Versed) injection  - Omnicell Override Pull, , , ,     oxybutynin (Ditropan) tablet 5 mg, 5 mg, oral, BID, Triny Mao PA-C, 5 mg at 11/24/23 0818    oxygen (O2) therapy, , inhalation, Continuous PRN - O2/gases, Triny Mao PA-C    pantoprazole (ProtoNix) EC tablet 40 mg, 40 mg, oral, q12h, Triny Mao PA-C      Review of  "Systems:    Denies fevers, chills or recent illnesses, no chest pain or difficulty with breathing, she is supported with continuous supplemental oxygen in the home setting, no abdominal pain or changes in bowel or urinary function, no abnormal bleeding    Performance Status:  The Karnofsky performance scale today is 40, Disabled; requires special care and assistance (ECOG equivalent 3).        OBJECTIVE  Physical Exam:  /67   Pulse 62   Temp 36.1 °C (97 °F)   Resp 18   Ht 1.626 m (5' 4\")   Wt 62.6 kg (138 lb)   SpO2 96%   BMI 23.69 kg/m²    General: awake, alert, resting comfortably, she appears weak and frail, no acute distress  HEENT: pupils equal and round, no scleral icterus  Pulmonary: Breathing comfortably at rest with the support of supplemental oxygen via nasal cannula  Cardiac: regular rate  Abdomen: soft, nondistended, non tender to palpation   EXT: no peripheral edema, no asymmetry noted  MSK: no focal joint swelling noted  Neuro: A&O x 3, cranial nerves II through XII grossly intact, sensation intact, 3-4/5 weakness in the right lower extremity both proximally and distally  Psych: Normal affect     Laboratory Review:  T  Last BMP:  [unfilled]  Last CBC w. Diff.:  [unfilled]      Imaging:    MRI brain 11/24:  FINDINGS:  There are numerous scattered enhancing lesions of various sizes  within cerebral hemispheres bilaterally denoted by arrows on series  10 of post gadolinium axial volumetric T1 weighted MRI images. The  largest heterogeneous enhancing lesion within the left cerebral  hemisphere is identified along the inferior left frontal lobe  measuring approximately 23 mm by 22 mm in transaxial dimensions as  seen on axial post gadolinium volumetric T1 slice 87 of 256. There is  a 2nd dominant heterogeneous enhancing lesion noted along the  parasagittal posterior left frontal lobe convexity measuring  approximately 19 mm x 15 mm in transaxial dimensions as seen on axial  post " gadolinium volumetric T1 slice 34 of 256. The largest of the  enhancing lesions within the right cerebral hemisphere is identified  within the posterior right frontal lobe convexity measuring  approximately 15 mm x 13 mm in transaxial dimensions as seen on axial  post gadolinium volumetric T1 slice 48 of 256. There is significant  surrounding vasogenic edema within the cerebral hemispheres  bilaterally. There is associated mass effect with effacement and  partial effacement of surrounding sulci within the cerebral  hemispheres left greater than right. There is partial effacement of  the lateral ventricles left greater than right. There is no  significant midline shift.      The gradient echo T2 images demonstrate blooming dark signal  associated with several of the enhancing mass lesions within the  cerebral hemispheres bilaterally suggesting associated blood products  and/or dystrophic calcification/mineralization.      The diffusion-weighted images demonstrate nonspecific increased  diffusion signal associated with several of the enhancing lesions  which is nonspecific finding as increased diffusion signal can be  seen with evolving blood products, neoplasm, as well as cytotoxic  edema. Increased diffusion signal can also be seen with underlying  infection although this latter possibility is felt less likely.      The above findings are superimposed upon mild-to-moderate brain  parenchymal volume loss.      There are additional scattered nonspecific white matter changes  within the cerebral hemispheres bilaterally as well as ill-defined  increased signal on the FLAIR and T2 images overlying the brainstem  which while nonspecific, given the patient's age, likely represent  sequelae of small-vessel ischemic change.      There is mild mucosal thickening noted within scattered ethmoid air  cells.      There is opacification of a few left mastoid air cells.      IMPRESSION:  There are numerous scattered enhancing  lesions of various sizes  within cerebral hemispheres bilaterally denoted by arrows on series  10 of post gadolinium axial volumetric T1 weighted MRI images as  described above. The MRI findings are most concerning for metastatic  disease.           ASSESSMENT:  This is a 78-year-old female with multiple chronic comorbidities including a history of papillary thyroid cancer, left lung adenocarcinoma, and 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  The patient presents to to [facility] emergency department on 11/22 with a 10-day history of progressive weakness, confusion, with changes in gait and speech.  The patient was transferred to [facility] for further management.  She has been provided with dexamethasone.  The patient has been seen by neurosurgical services, and radiation oncology has been asked to assist with potential care coordination.    Initial attempts at MRI brain with contrast were unsuccessful.  Repeat studies were obtained today with anesthesia.  Approximately 13 enhancing lesions are seen within the cerebral hemispheres, the largest measuring approximately 2.3 x 2.2 cm involving the left frontal lobe, with associated vasogenic edema and mass effect.  There is no significant midline shift.    Today, the patient continues to complain of changes in cognition, specifically her memory.  She also is having some right-sided weakness, primarily in her right leg.          PLAN:  Patient was discussed with my attending physician, Dr. Jutsice.    Imaging results and indications for radiation were briefly discussed with the patient and her significant other.  We will plan to further review with neurosurgical services and our CNS radiation oncology providers week of 11/27 to determine whether the patient is a candidate for SRS versus WBRT.    -Continue to Methasone with GI prophylaxis  -To review with CNS radiation oncology colleagues to determine if the patient is a candidate for SRS versus  WBRT  -Recommend review with [facility] oncological services for restaging of lung cancer      I spent 70 minutes in the professional and overall care of this patient.

## 2023-11-25 ENCOUNTER — APPOINTMENT (OUTPATIENT)
Dept: RADIOLOGY | Facility: HOSPITAL | Age: 78
DRG: 054 | End: 2023-11-25
Payer: COMMERCIAL

## 2023-11-25 LAB
ALBUMIN SERPL BCP-MCNC: 3.5 G/DL (ref 3.4–5)
ALP SERPL-CCNC: 64 U/L (ref 33–136)
ALT SERPL W P-5'-P-CCNC: 17 U/L (ref 7–45)
ANION GAP SERPL CALC-SCNC: 12 MMOL/L (ref 10–20)
AST SERPL W P-5'-P-CCNC: 18 U/L (ref 9–39)
BILIRUB SERPL-MCNC: 0.7 MG/DL (ref 0–1.2)
BUN SERPL-MCNC: 37 MG/DL (ref 6–23)
CALCIUM SERPL-MCNC: 9.3 MG/DL (ref 8.6–10.6)
CHLORIDE SERPL-SCNC: 105 MMOL/L (ref 98–107)
CO2 SERPL-SCNC: 31 MMOL/L (ref 21–32)
CREAT SERPL-MCNC: 0.58 MG/DL (ref 0.5–1.05)
ERYTHROCYTE [DISTWIDTH] IN BLOOD BY AUTOMATED COUNT: 11.8 % (ref 11.5–14.5)
GFR SERPL CREATININE-BSD FRML MDRD: >90 ML/MIN/1.73M*2
GLUCOSE BLD MANUAL STRIP-MCNC: 123 MG/DL (ref 74–99)
GLUCOSE SERPL-MCNC: 110 MG/DL (ref 74–99)
HCT VFR BLD AUTO: 37.9 % (ref 36–46)
HGB BLD-MCNC: 12.4 G/DL (ref 12–16)
MAGNESIUM SERPL-MCNC: 2.14 MG/DL (ref 1.6–2.4)
MCH RBC QN AUTO: 31 PG (ref 26–34)
MCHC RBC AUTO-ENTMCNC: 32.7 G/DL (ref 32–36)
MCV RBC AUTO: 95 FL (ref 80–100)
NRBC BLD-RTO: 0 /100 WBCS (ref 0–0)
PLATELET # BLD AUTO: 187 X10*3/UL (ref 150–450)
POTASSIUM SERPL-SCNC: 4.3 MMOL/L (ref 3.5–5.3)
PROT SERPL-MCNC: 5.5 G/DL (ref 6.4–8.2)
RBC # BLD AUTO: 4 X10*6/UL (ref 4–5.2)
SODIUM SERPL-SCNC: 144 MMOL/L (ref 136–145)
T4 FREE SERPL-MCNC: 1.38 NG/DL (ref 0.78–1.48)
WBC # BLD AUTO: 7 X10*3/UL (ref 4.4–11.3)

## 2023-11-25 PROCEDURE — 85027 COMPLETE CBC AUTOMATED: CPT | Performed by: NURSE PRACTITIONER

## 2023-11-25 PROCEDURE — 2500000001 HC RX 250 WO HCPCS SELF ADMINISTERED DRUGS (ALT 637 FOR MEDICARE OP)

## 2023-11-25 PROCEDURE — 2500000001 HC RX 250 WO HCPCS SELF ADMINISTERED DRUGS (ALT 637 FOR MEDICARE OP): Performed by: INTERNAL MEDICINE

## 2023-11-25 PROCEDURE — 93010 ELECTROCARDIOGRAM REPORT: CPT | Performed by: INTERNAL MEDICINE

## 2023-11-25 PROCEDURE — 84439 ASSAY OF FREE THYROXINE: CPT

## 2023-11-25 PROCEDURE — 82947 ASSAY GLUCOSE BLOOD QUANT: CPT

## 2023-11-25 PROCEDURE — 2500000004 HC RX 250 GENERAL PHARMACY W/ HCPCS (ALT 636 FOR OP/ED)

## 2023-11-25 PROCEDURE — 80053 COMPREHEN METABOLIC PANEL: CPT | Performed by: NURSE PRACTITIONER

## 2023-11-25 PROCEDURE — 1170000001 HC PRIVATE ONCOLOGY ROOM DAILY

## 2023-11-25 PROCEDURE — 2500000004 HC RX 250 GENERAL PHARMACY W/ HCPCS (ALT 636 FOR OP/ED): Performed by: NURSE PRACTITIONER

## 2023-11-25 PROCEDURE — 74018 RADEX ABDOMEN 1 VIEW: CPT | Performed by: RADIOLOGY

## 2023-11-25 PROCEDURE — 83735 ASSAY OF MAGNESIUM: CPT | Performed by: NURSE PRACTITIONER

## 2023-11-25 PROCEDURE — 2500000004 HC RX 250 GENERAL PHARMACY W/ HCPCS (ALT 636 FOR OP/ED): Performed by: STUDENT IN AN ORGANIZED HEALTH CARE EDUCATION/TRAINING PROGRAM

## 2023-11-25 PROCEDURE — 99233 SBSQ HOSP IP/OBS HIGH 50: CPT | Performed by: INTERNAL MEDICINE

## 2023-11-25 PROCEDURE — 92611 MOTION FLUOROSCOPY/SWALLOW: CPT | Mod: GN

## 2023-11-25 PROCEDURE — C9113 INJ PANTOPRAZOLE SODIUM, VIA: HCPCS | Performed by: NURSE PRACTITIONER

## 2023-11-25 PROCEDURE — 36415 COLL VENOUS BLD VENIPUNCTURE: CPT | Performed by: NURSE PRACTITIONER

## 2023-11-25 PROCEDURE — 74230 X-RAY XM SWLNG FUNCJ C+: CPT | Performed by: RADIOLOGY

## 2023-11-25 PROCEDURE — 74230 X-RAY XM SWLNG FUNCJ C+: CPT

## 2023-11-25 PROCEDURE — 74018 RADEX ABDOMEN 1 VIEW: CPT

## 2023-11-25 RX ORDER — SODIUM CHLORIDE 9 MG/ML
100 INJECTION, SOLUTION INTRAVENOUS CONTINUOUS
Status: DISCONTINUED | OUTPATIENT
Start: 2023-11-25 | End: 2023-11-26

## 2023-11-25 RX ADMIN — DEXAMETHASONE SODIUM PHOSPHATE 4 MG: 4 INJECTION, SOLUTION INTRAMUSCULAR; INTRAVENOUS at 05:25

## 2023-11-25 RX ADMIN — SODIUM CHLORIDE 100 ML/HR: 9 INJECTION, SOLUTION INTRAVENOUS at 07:38

## 2023-11-25 RX ADMIN — BARIUM SULFATE 5 ML: 400 PASTE ORAL at 08:39

## 2023-11-25 RX ADMIN — LEVOTHYROXINE SODIUM 137 MCG: 137 TABLET ORAL at 06:58

## 2023-11-25 RX ADMIN — PANTOPRAZOLE SODIUM 40 MG: 40 INJECTION, POWDER, FOR SOLUTION INTRAVENOUS at 06:58

## 2023-11-25 RX ADMIN — OXYBUTYNIN CHLORIDE 5 MG: 5 TABLET ORAL at 22:11

## 2023-11-25 RX ADMIN — LEVETIRACETAM 500 MG: 5 INJECTION INTRAVENOUS at 20:21

## 2023-11-25 RX ADMIN — BARIUM SULFATE 20 ML: 400 SUSPENSION ORAL at 08:40

## 2023-11-25 RX ADMIN — ATORVASTATIN CALCIUM 10 MG: 10 TABLET, FILM COATED ORAL at 22:11

## 2023-11-25 RX ADMIN — GABAPENTIN 300 MG: 300 CAPSULE ORAL at 22:11

## 2023-11-25 RX ADMIN — DEXAMETHASONE SODIUM PHOSPHATE 4 MG: 4 INJECTION, SOLUTION INTRAMUSCULAR; INTRAVENOUS at 10:53

## 2023-11-25 RX ADMIN — LORAZEPAM 0.5 MG: 2 INJECTION INTRAMUSCULAR; INTRAVENOUS at 16:43

## 2023-11-25 RX ADMIN — DEXAMETHASONE SODIUM PHOSPHATE 4 MG: 4 INJECTION, SOLUTION INTRAMUSCULAR; INTRAVENOUS at 22:11

## 2023-11-25 RX ADMIN — LEVETIRACETAM 500 MG: 5 INJECTION INTRAVENOUS at 10:57

## 2023-11-25 RX ADMIN — CYCLOBENZAPRINE 10 MG: 10 TABLET, FILM COATED ORAL at 22:11

## 2023-11-25 RX ADMIN — DEXAMETHASONE SODIUM PHOSPHATE 4 MG: 4 INJECTION, SOLUTION INTRAMUSCULAR; INTRAVENOUS at 16:43

## 2023-11-25 RX ADMIN — HYDROCODONE BITARTRATE AND ACETAMINOPHEN 2 TABLET: 5; 325 TABLET ORAL at 22:11

## 2023-11-25 RX ADMIN — BARIUM SULFATE 15 ML: 0.81 POWDER, FOR SUSPENSION ORAL at 08:39

## 2023-11-25 ASSESSMENT — COGNITIVE AND FUNCTIONAL STATUS - GENERAL
DRESSING REGULAR LOWER BODY CLOTHING: A LOT
TURNING FROM BACK TO SIDE WHILE IN FLAT BAD: A LOT
WALKING IN HOSPITAL ROOM: A LOT
MOBILITY SCORE: 12
STANDING UP FROM CHAIR USING ARMS: A LOT
MOVING TO AND FROM BED TO CHAIR: A LOT
MOVING FROM LYING ON BACK TO SITTING ON SIDE OF FLAT BED WITH BEDRAILS: A LITTLE
EATING MEALS: A LOT
HELP NEEDED FOR BATHING: A LOT
CLIMB 3 TO 5 STEPS WITH RAILING: TOTAL
DAILY ACTIVITIY SCORE: 12
TOILETING: A LOT
PERSONAL GROOMING: A LOT
DRESSING REGULAR UPPER BODY CLOTHING: A LOT

## 2023-11-25 ASSESSMENT — PAIN SCALES - GENERAL
PAINLEVEL_OUTOF10: 7
PAINLEVEL_OUTOF10: 3
PAINLEVEL_OUTOF10: 0 - NO PAIN

## 2023-11-25 NOTE — CARE PLAN
Problem: Skin  Goal: Decreased wound size/increased tissue granulation at next dressing change  Outcome: Progressing  Goal: Participates in plan/prevention/treatment measures  Outcome: Progressing  Goal: Prevent/manage excess moisture  Outcome: Progressing  Goal: Prevent/minimize sheer/friction injuries  Outcome: Progressing  Goal: Promote/optimize nutrition  Outcome: Progressing  Goal: Promote skin healing  Outcome: Progressing     Problem: Fall/Injury  Goal: Not fall by end of shift  Outcome: Progressing  Goal: Be free from injury by end of the shift  Outcome: Progressing  Goal: Verbalize understanding of personal risk factors for fall in the hospital  Outcome: Progressing  Goal: Verbalize understanding of risk factor reduction measures to prevent injury from fall in the home  Outcome: Progressing  Goal: Use assistive devices by end of the shift  Outcome: Progressing  Goal: Pace activities to prevent fatigue by end of the shift  Outcome: Progressing   The patient's goals for the shift include Rest and comfort    The clinical goals for the shift include Maintain Safety    Over the shift, the patient did not make progress toward the following goals. Barriers to progression include safety. Recommendations to address these barriers include maintain safety precautions.     General Sunscreen Counseling: I recommended a broad spectrum sunscreen with a SPF of 30 or higher.  I explained that SPF 30 sunscreens block approximately 97 percent of the sun's harmful rays.  Sunscreens should be applied at least 15 minutes prior to expected sun exposure and then every 2 hours after that as long as sun exposure continues. If swimming or exercising sunscreen should be reapplied every 45 minutes to an hour after getting wet or sweating.  One ounce, or the equivalent of a shot glass full of sunscreen, is adequate to protect the skin not covered by a bathing suit. I also recommended a lip balm with a sunscreen as well. Sun protective clothing can be used in lieu of sunscreen but must be worn the entire time you are exposed to the sun's rays. Detail Level: Generalized

## 2023-11-25 NOTE — PROGRESS NOTES
"Valorie Montelongo is a 78 y.o. female on day 2 of admission presenting with Metastatic adenocarcinoma (CMS/HCC).    Subjective   Patient seen at bedside. States that she is feeling well today. States that she is very hungry and anxious to eat. Discussed MBSS results with patient and her  and risk of aspiration PNA. Discussed placement of feeding tube. Patient denies fever/chills, N/V/D/C, headache dizziness or vision changes.        Objective     Physical Exam  Constitutional:       Appearance: Normal appearance.   HENT:      Head: Normocephalic.      Nose: Nose normal.      Mouth/Throat:      Mouth: Mucous membranes are moist.   Eyes:      Pupils: Pupils are equal, round, and reactive to light.   Cardiovascular:      Rate and Rhythm: Normal rate and regular rhythm.   Pulmonary:      Effort: Pulmonary effort is normal.   Abdominal:      Palpations: Abdomen is soft.   Musculoskeletal:         General: Normal range of motion.      Cervical back: Normal range of motion.   Skin:     General: Skin is warm.   Neurological:      Mental Status: She is alert and oriented to person, place, and time.   Psychiatric:         Mood and Affect: Mood normal.         Behavior: Behavior normal.       Last Recorded Vitals  Blood pressure 129/66, pulse 55, temperature 35.9 °C (96.6 °F), temperature source Temporal, resp. rate 18, height 1.626 m (5' 4\"), weight 62.6 kg (138 lb), SpO2 99 %.  Intake/Output last 3 Shifts:  I/O last 3 completed shifts:  In: 100 (1.6 mL/kg) [I.V.:100 (1.6 mL/kg)]  Out: 200 (3.2 mL/kg) [Urine:200 (0.1 mL/kg/hr)]  Weight: 62.6 kg     Relevant Results                        Malnutrition Diagnosis Status: New  Malnutrition Diagnosis: Moderate malnutrition related to chronic disease or condition  As Evidenced by: moderate muscle mass loss and moderate subcutanous fat loss  I agree with the dietitian's malnutrition diagnosis.      Assessment/Plan   Principal Problem:    Metastatic adenocarcinoma " (CMS/HCC)  Active Problems:    Pulmonary cancer (CMS/HCC)    HTN (hypertension)    Hyperlipidemia    MI (myocardial infarction) (CMS/HCC)    Chronic obstructive pulmonary disease (CMS/HCC)    History of home oxygen therapy    Gastroesophageal reflux disease    Anxiety    Valorie Montelongo is a 79 y/o F w h/o metastatic lung cancer (s/p VATS RAYRAY and LLL resection at  11/18/21 but now follows with oncology at [facility]), h/o papillary thyroid cancer (s/p thyroidectomy 7/2021), oral cancer (s/p resection and XRT), HTN, HLD, NSTEMI (11/25/21), COPD (on 2-3L at baseline), GERD, and anxiety who presented as a transfer from [facility] for neurosurgery evaluation as CT head at OSH showed new metastatic brain lesions. Patient has been experiencing b/l LE weakness, gait changes and speech difficulties x 10 days, and have progressively gotten worse. MRI brain without contrast (11/23) showed multiple brain metastases with surrounding vasogenic edema. MRI whole spine (11/23) without evidence of spinal metastasis or cord compression. NSGY consulted on admit, rec no acute surgical intervention and rec to consult Rad Onc. Started on dexamethasone 4mg q6h + keppra 500mg BID per NSGY, also rec'd MRI brain with and without 11/24. SLP consulted-failed MBSS. Plan for Dobbhoff placement now and will address possible PEG at a later time. ENT consulted 11/25 d/t need for feeding tube and thyroid mass narrowing trachea. Rad Onc consulted 11/24 and recs pending.      # New brain metastases / bilateral weakness   - hx of metastatic lung cancer and metastatic papillary thyroid cancer; experiencing progressive b/l LE weakness, speech and gait changes  - OSH 11/22/23 CT head shows multiple masses and vasogenic edema- patient transferred to [facility] for further evaluation   - CT whole spine without contrast (11/23/23): No CT evidence of metastatic disease in the spine, degernative changes and scoliosis noted   - MRI whole spine  without contrast (11/23/23): no lesions noted within the cervical, thoracic or lumbar spine, no significant spinal canal or neural foraminal stenosis   - MRI brain without contrast (11/23/23): numerus cerebral lesions scattered throughout the b/l cerebral hemispheres with significant sorrouning vasogenic edema, components of hemorrhage - however exam is somewhat limited due to non-contrast examination   - MRI brain with and without contrast under anesthesia 11/24   - NSGY consulted  on admit - rec no acute surgical intervention and Rad Onc consult, start dex + keppra + MRI brain w/wo   - (11/23- current) continue dexamethasone 4mg q6h + PPI support per neurosurgery recs   - (11/23- current) continue Keppra 500mg BID per neurosurgery recs- currently IV, plan to transition to PO pending SLP eval   - Q4 neuro checks   - PT/OT consulted- recs pending   - Rad Onc consulted 11/24 - waiting on MRI brain w/wo for recs   - 11/24 oncology consulted-recs pending      # lung adenocarcinoma   - follows with Dr. David Mathias at [facility] - emailed about admission and above radiographic findings 11/23   - s/p VATs RAYRAY and LLL resection at  on 11/18/2021   - oncological history limited as community records have limited oncologic information   -Alectinib 1/2022 - 12/2022. It was stopped at that time due to what was thought to be pneumonitis. No further treatment since then  - CT chest with contrast (11/23): increased soft tissue thickening in the left thyoid surgical bed with probable involvement of the left cervical lymph node, dozens of bilateral pulmonary nodules of which a few are new from previous CT (11/2021), new enlargement of aortopulmonary window lymph nose and right interlobar lymph nodes suspicious for metastatic disease      # H/o papillary thyroid cancer / oral cancer / acquired hypothyroidism   - s/p s/p thyroidectomy 7/2021  - TSH level 0.04 11/24  - T4 pending   - Continue with home levothyroxine 137 mcg daily before  breakfast-has not received d/t aspiration      # progressive dysphagia   - SLP consulted on admission I/s/o new brain mets and occasional reported dysphagia  - MBSS 11/25 showing severe impairment  - Plan for Dobbhoff then likely transition to PEG  - 11/25 ENT consulted for Dobbhoff placement d/t thyroid mass invading trachea  - Dietician consulted for TF recs once feeding tube is placed. Recs are in note from 11/25       - available meds transitioned to IV     # HTN  - continue with home amlodipine 5mg daily, home Irbesartan substituted for losartan 50mg daily while inpatient      # CAD with H /o NSTEMI  - NSTEMI 11/25/2021   - continue with home atorvastatin 10mg nightly and daily ASA     # COPD  - former smoker, baseline oxygen continuous 2-3L at home   - continue with home Ipratropium 21mcg 2 sprays TID PRN shortness of breath   - continue with home albuterol 90mcg q6 hours PRN wheezing      # chronic back pain   - CT spine and MRI without contrast (11/23/23): without evidence of spinal metastasis or cord compression   - continue home gabapentin 300mg TID, Norco 5-325mg q4 hours PRN moderate pain, flexeril 10mg TID      # Overreactive bladder   - Home Toviaz substituted for oxybutynin 5mg BID while inpatient      # Anxiety   - continue with home ativan PRN anxiety      # DVT prophy / misc  - hold prophy Lovenox with MRI brain without contrast suspicious components of hemorrhage  - PPI support with dexamethasone      #Dispo   - FULL CODE, confirmed on admission   - Primary oncologist: Dr. Mathias - notified of admission 11/23   - DC pending NSGY and Rad Onc recs, PT/OT recs, feeding tube placement and plan   -  José Manuel: [phone] - updated at bedside 11/24           I spent 60 minutes in the professional and overall care of this patient.      Aure Whiting, APRN-CNP    Patient discussed with Dr. Wu

## 2023-11-25 NOTE — CARE PLAN
The patient's goals for the shift include Rest and comfort    The clinical goals for the shift include Maintain Safety    Over the shift, the patient did not make progress toward the following goals. Barriers to progression include npo status. Recommendations to address these barriers include swallow testing needs to be completed.

## 2023-11-25 NOTE — PROGRESS NOTES
"Valorie Montelongo is a 78 y.o. female with h/o HTN HLD NSTEMI 2/2 pneumothorax (11/25/21), lung adenocarinoma s/p L VATS, RAYRAY and LLL wedge resection (11/18/21), COPD (on 2L), oral cancer s/p resection and rads, GERD, papillary thyroid cancer s/p thyroidectomy 7/2021, hypothyroidism OAB, chronic pain syndrome, anxiety p/w weakness and gait instability    Subjective   No events overnight     Objective     Physical Exam  AOx3  R 4, drift  L 5  SILT    Last Recorded Vitals  Blood pressure 120/67, pulse 54, temperature 36.1 °C (97 °F), temperature source Temporal, resp. rate 18, height 1.626 m (5' 4\"), weight 62.6 kg (138 lb), SpO2 95 %.  Intake/Output last 3 Shifts:  I/O last 3 completed shifts:  In: - (0 mL/kg)   Out: 50 (0.8 mL/kg) [Urine:50 (0 mL/kg/hr)]  Weight: 62.6 kg     Relevant Results  MR brain w and wo IV contrast 11/23/2023    Narrative  FINDINGS:  The postcontrast images are markedly motion degraded and essentially  nondiagnostic. The multiple parenchymal lesions demonstrated on the  examination from earlier on the same date are again noted but are  inadequately evaluated due to motion degradation. There is again  extensive associated vasogenic edema as well as mass effect and  midline shift, unchanged from the previous exam.    Impression  Markedly motion degraded and essentially nondiagnostic postcontrast  images of the brain.      Assessment/Plan   Principal Problem:    Metastatic adenocarcinoma (CMS/HCC)  Active Problems:    Pulmonary cancer (CMS/HCC)    HTN (hypertension)    Hyperlipidemia    MI (myocardial infarction) (CMS/HCC)    Chronic obstructive pulmonary disease (CMS/HCC)    History of home oxygen therapy    Gastroesophageal reflux disease    Tomy Montelongo is a 78 y.o. female with h/o HTN HLD NSTEMI 2/2 pneumothorax (11/25/21), lung adenocarinoma s/p L VATS, RAYRAY and LLL wedge resection (11/18/21), COPD (on 2L), oral cancer s/p resection and rads, GERD, papillary thyroid cancer s/p " thyroidectomy 7/2021, hypothyroidism OAB, chronic pain syndrome, anxiety p/w weakness and gait instability, CTH LF mass, 11/23 MRI neuroaxis EF 1.5x1.5cm, LF 1x1cm, and 2x2cm lesions, R parietal 1x1cm lesion, 11/24 MRI brain mult mets, largest 2x2cm LF met    Stokes primary  Dex 4q6, PPI, SSI   Keppra 500 BID seizure prophylaxis   Please document prognosis   Rad onc recs - further review week of 11/27 to determine if patient is candidate for SRS v WBRT  SCDs, ok for dvt ppx    Please page Neurosurgery pager [phone]] with any questions or concerns during the day.  Progress note authored by [facility]float resident. Epic messages will have delayed responses.      Miguelito Dozier MD

## 2023-11-25 NOTE — CONSULTS
"Nutrition Initial Assessment:   Nutrition Assessment    Reason for Assessment: Tube feeding recommendations    Patient is a 78 y.o. female presenting as a transfer from [facility] for neurosurgery evaluation as CT head at OSH showed new metastatic brain lesions.     MBSS completed today showed - Poor epiglottal inversion, tongue base retraction, and hyolaryngeal elevation/excursion resulting in SILENT aspiration with all consistencies assessed and residue following the swallow with all consistencies assessed.      PMHx  metastatic lung cancer (11/18/21), papillary thyroid cancer (s/p thyroidectomy 7/2021), oral cancer, HTN, HLD, NSTEMI 2/2 pneumothorax, COPD (on 2L), GERD, and anxiety     Nutrition History:  Food and Nutrient History: Pt and spouse report that pt was eating ok at home but had recently been eating less than usual.  Was drinking 1 bottle of Ensure Original daily.  Energy Intake: Fair 50-75 %  Food Allergies/Intolerances:  None  GI Symptoms: None     Oral Problems: None PTA       Anthropometrics:  Height: 162.6 cm (5' 4\")   Weight: 62.6 kg (138 lb)   BMI (Calculated): 23.68  IBW/kg (Dietitian Calculated): 55 kg  Percent of IBW: 114 %       Weight History:   No weight history available    Weight Change %:  Pt states that usual weight was about 170#(77kg) and she weighed that as recently as the summer. If correct, 18% wt loss in ~4 months is significant but no available recorded weights to confirm.        Nutrition Focused Physical Exam Findings:    Subcutaneous Fat Loss:   Orbital Fat Pads: Mild-Moderate (slight dark circles and slight hollowing)  Buccal Fat Pads: Mild-Moderate (flat cheeks, minimal bounce)  Triceps: Mild-moderate (less than ample fat tissue)  Muscle Wasting:  Temporalis: Mild-Moderate (slight depression)  Pectoralis (Clavicular Region): Mild-Moderate (some protrusion of clavicle)  Deltoid/Trapezius: Mild-Moderate (slight protrusion of acromion process)  Quadriceps: " Mild-moderate (mild depression on inner and outer thigh)  Gastrocnemius: Mild-Moderate (not well developed muscle)  Edema:  Edema: none  Physical Findings:  Hair: Negative  Eyes: Negative  Mouth: Negative (edendulous)  Nails: Negative  Skin: Negative    Nutrition Significant Labs:  CBC Trend:   Results from last 7 days   Lab Units 11/25/23 0721 11/24/23 0647 11/23/23  0059   WBC AUTO x10*3/uL 7.0 5.7 4.1*   RBC AUTO x10*6/uL 4.00 4.18 4.20   HEMOGLOBIN g/dL 12.4 12.5 12.9   HEMATOCRIT % 37.9 38.6 38.5   MCV fL 95 92 92   PLATELETS AUTO x10*3/uL 187 179 181    , BMP Trend:   Results from last 7 days   Lab Units 11/25/23 0721 11/24/23 0647 11/23/23 0059   GLUCOSE mg/dL 110* 106* 127*   CALCIUM mg/dL 9.3 9.0 9.5   SODIUM mmol/L 144 144 145   POTASSIUM mmol/L 4.3 3.9 3.7   CO2 mmol/L 31 29 30   CHLORIDE mmol/L 105 105 105   BUN mg/dL 37* 26* 20   CREATININE mg/dL 0.58 0.53 0.55        Nutrition Specific Medications:  Vit D, Decadron, Synthroid,         Dietary Orders (From admission, onward)       Start     Ordered    11/24/23 1529  NPO Diet Except: Sips with meds; Effective now  Diet effective now        Question:  Except:  Answer:  Sips with meds    11/24/23 1529                     Estimated Needs:   Total Energy Estimated Needs (kCal): 1600 kCal  Method for Estimating Needs: MSJ = 1109 x 1.2-1.4  Total Protein Estimated Needs (g): 80 g  Method for Estimating Needs: 1.2-1.4gm/kg              Nutrition Diagnosis   Nutrition Diagnosis:  Malnutrition Diagnosis  Patient has Malnutrition Diagnosis: Yes  Diagnosis Status: New  Malnutrition Diagnosis: Moderate malnutrition related to chronic disease or condition  As Evidenced by: moderate muscle mass loss and moderate subcutanous fat loss    Nutrition Diagnosis  Patient has Nutrition Diagnosis: Yes  Diagnosis Status (1): New  Nutrition Diagnosis 1: Swallowing difficulity  Related to (1): new brain mets  As Evidenced by (1): NPO status and need for DHT placement        Nutrition Interventions/Recommendations   Nutrition Interventions and Recommendations:        Nutrition Prescription:  Individualized Nutrition Prescription Provided for : Isosource 1.5 at goal rate of 50ml/hr [id number] to provide 1650 calories, 75 gm pro and 840ml free water.        Nutrition Recommendations:    Following feeding tube placement recommend starting Isosource 1.5 at 15ml/hr x8 hours. If tolerated increase by 15ml/hr q8h to reach goal rate of 50ml/hr x22 hours/day.   Hold tube feeding 1 hour before and 1 hour after Synthroid.   Monitor RFP during initiation of tube feeding.  If lytes low, hold at current rate and replace before advancing to goal as recommended.   Free water flushes per team. Tube feeding at goal rate will provide 840ml free water.     Nutrition Education:    Discussed starting tube feeding and what to expect.        Nutrition Monitoring and Evaluation   Monitoring/Evaluation:   Food/Nutrient Related History Monitoring  Monitoring and Evaluation Plan: Enteral and parenteral nutrition intake  Criteria: tube feeding at goal rate within 48 hours, tolerating tube feeding at goal rate    Body Composition/Growth/Weight History  Monitoring and Evaluation Plan: Weight  Criteria: Stable weight    Biochemical Data, Medical Tests and Procedures  Monitoring and Evaluation Plan: Electrolyte/renal panel, Glucose/endocrine profile  Criteria: Labs WNlL    Time Spent/Follow-up Reminder:   Follow Up  Time Spent (min): 45 minutes  Last Date of Nutrition Visit: 11/25/23  Nutrition Follow-Up Needed?: Dietitian to reassess per policy

## 2023-11-25 NOTE — PROGRESS NOTES
Discharge Planning Note:        Valorie Montelnogo is a 78 y.o. female on day 2 of admission presenting with Metastatic adenocarcinoma (CMS/HCC).        Called patient room and spoke with her significant other Arthur [phone]. Confirmed all information on demographics page. Informed therapy recommended moderate level of care post discharge he reports therapy is to re evaluate on Monday. The goal of the patient and significant other is to return home with homecare if needed. Declined a list of preferences at this time wants to wait until therapy re-evaluation.     Addendum: Lives with significant other uses 02/nc @ 3l continuous. Uses DME walker at home.          Ellie Roberts RN TCC       Performed

## 2023-11-26 LAB
ALBUMIN SERPL BCP-MCNC: 3.5 G/DL (ref 3.4–5)
ALP SERPL-CCNC: 64 U/L (ref 33–136)
ALT SERPL W P-5'-P-CCNC: 19 U/L (ref 7–45)
ANION GAP SERPL CALC-SCNC: 11 MMOL/L (ref 10–20)
AST SERPL W P-5'-P-CCNC: 16 U/L (ref 9–39)
BASOPHILS # BLD AUTO: 0 X10*3/UL (ref 0–0.1)
BASOPHILS NFR BLD AUTO: 0 %
BILIRUB SERPL-MCNC: 0.6 MG/DL (ref 0–1.2)
BUN SERPL-MCNC: 40 MG/DL (ref 6–23)
CALCIUM SERPL-MCNC: 9.1 MG/DL (ref 8.6–10.6)
CHLORIDE SERPL-SCNC: 108 MMOL/L (ref 98–107)
CO2 SERPL-SCNC: 30 MMOL/L (ref 21–32)
CREAT SERPL-MCNC: 0.68 MG/DL (ref 0.5–1.05)
EOSINOPHIL # BLD AUTO: 0 X10*3/UL (ref 0–0.4)
EOSINOPHIL NFR BLD AUTO: 0 %
ERYTHROCYTE [DISTWIDTH] IN BLOOD BY AUTOMATED COUNT: 11.8 % (ref 11.5–14.5)
GFR SERPL CREATININE-BSD FRML MDRD: 90 ML/MIN/1.73M*2
GLUCOSE SERPL-MCNC: 135 MG/DL (ref 74–99)
HCT VFR BLD AUTO: 39 % (ref 36–46)
HGB BLD-MCNC: 12.7 G/DL (ref 12–16)
IMM GRANULOCYTES # BLD AUTO: 0.03 X10*3/UL (ref 0–0.5)
IMM GRANULOCYTES NFR BLD AUTO: 0.4 % (ref 0–0.9)
LYMPHOCYTES # BLD AUTO: 0.34 X10*3/UL (ref 0.8–3)
LYMPHOCYTES NFR BLD AUTO: 4.4 %
MCH RBC QN AUTO: 30.7 PG (ref 26–34)
MCHC RBC AUTO-ENTMCNC: 32.6 G/DL (ref 32–36)
MCV RBC AUTO: 94 FL (ref 80–100)
MONOCYTES # BLD AUTO: 0.38 X10*3/UL (ref 0.05–0.8)
MONOCYTES NFR BLD AUTO: 5 %
NEUTROPHILS # BLD AUTO: 6.9 X10*3/UL (ref 1.6–5.5)
NEUTROPHILS NFR BLD AUTO: 90.2 %
NRBC BLD-RTO: 0 /100 WBCS (ref 0–0)
PLATELET # BLD AUTO: 184 X10*3/UL (ref 150–450)
POTASSIUM SERPL-SCNC: 4.2 MMOL/L (ref 3.5–5.3)
PROT SERPL-MCNC: 5.2 G/DL (ref 6.4–8.2)
RBC # BLD AUTO: 4.14 X10*6/UL (ref 4–5.2)
SODIUM SERPL-SCNC: 145 MMOL/L (ref 136–145)
WBC # BLD AUTO: 7.7 X10*3/UL (ref 4.4–11.3)

## 2023-11-26 PROCEDURE — 1170000001 HC PRIVATE ONCOLOGY ROOM DAILY

## 2023-11-26 PROCEDURE — 2500000001 HC RX 250 WO HCPCS SELF ADMINISTERED DRUGS (ALT 637 FOR MEDICARE OP)

## 2023-11-26 PROCEDURE — 2500000004 HC RX 250 GENERAL PHARMACY W/ HCPCS (ALT 636 FOR OP/ED): Performed by: NURSE PRACTITIONER

## 2023-11-26 PROCEDURE — 80053 COMPREHEN METABOLIC PANEL: CPT

## 2023-11-26 PROCEDURE — C9113 INJ PANTOPRAZOLE SODIUM, VIA: HCPCS | Performed by: NURSE PRACTITIONER

## 2023-11-26 PROCEDURE — 3E0G76Z INTRODUCTION OF NUTRITIONAL SUBSTANCE INTO UPPER GI, VIA NATURAL OR ARTIFICIAL OPENING: ICD-10-PCS

## 2023-11-26 PROCEDURE — 2500000004 HC RX 250 GENERAL PHARMACY W/ HCPCS (ALT 636 FOR OP/ED): Performed by: STUDENT IN AN ORGANIZED HEALTH CARE EDUCATION/TRAINING PROGRAM

## 2023-11-26 PROCEDURE — 85025 COMPLETE CBC W/AUTO DIFF WBC: CPT

## 2023-11-26 PROCEDURE — 36415 COLL VENOUS BLD VENIPUNCTURE: CPT

## 2023-11-26 PROCEDURE — 99222 1ST HOSP IP/OBS MODERATE 55: CPT | Performed by: OTOLARYNGOLOGY

## 2023-11-26 PROCEDURE — 2500000004 HC RX 250 GENERAL PHARMACY W/ HCPCS (ALT 636 FOR OP/ED)

## 2023-11-26 PROCEDURE — 99233 SBSQ HOSP IP/OBS HIGH 50: CPT

## 2023-11-26 PROCEDURE — 94760 N-INVAS EAR/PLS OXIMETRY 1: CPT

## 2023-11-26 RX ORDER — GABAPENTIN 250 MG/5ML
300 SOLUTION ORAL EVERY 8 HOURS SCHEDULED
Status: DISCONTINUED | OUTPATIENT
Start: 2023-11-26 | End: 2023-12-02 | Stop reason: HOSPADM

## 2023-11-26 RX ADMIN — LEVETIRACETAM 500 MG: 5 INJECTION INTRAVENOUS at 08:51

## 2023-11-26 RX ADMIN — DEXAMETHASONE SODIUM PHOSPHATE 4 MG: 4 INJECTION, SOLUTION INTRAMUSCULAR; INTRAVENOUS at 11:23

## 2023-11-26 RX ADMIN — DEXAMETHASONE SODIUM PHOSPHATE 4 MG: 4 INJECTION, SOLUTION INTRAMUSCULAR; INTRAVENOUS at 16:48

## 2023-11-26 RX ADMIN — OXYBUTYNIN CHLORIDE 5 MG: 5 TABLET ORAL at 21:08

## 2023-11-26 RX ADMIN — SODIUM CHLORIDE 100 ML/HR: 9 INJECTION, SOLUTION INTRAVENOUS at 05:07

## 2023-11-26 RX ADMIN — PANTOPRAZOLE SODIUM 40 MG: 40 INJECTION, POWDER, FOR SOLUTION INTRAVENOUS at 06:32

## 2023-11-26 RX ADMIN — GABAPENTIN 300 MG: 250 SOLUTION ORAL at 14:01

## 2023-11-26 RX ADMIN — GABAPENTIN 300 MG: 250 SOLUTION ORAL at 09:02

## 2023-11-26 RX ADMIN — DEXAMETHASONE SODIUM PHOSPHATE 4 MG: 4 INJECTION, SOLUTION INTRAMUSCULAR; INTRAVENOUS at 04:51

## 2023-11-26 RX ADMIN — ATORVASTATIN CALCIUM 10 MG: 10 TABLET, FILM COATED ORAL at 21:08

## 2023-11-26 RX ADMIN — GABAPENTIN 300 MG: 250 SOLUTION ORAL at 21:08

## 2023-11-26 RX ADMIN — LEVETIRACETAM 500 MG: 5 INJECTION INTRAVENOUS at 21:08

## 2023-11-26 RX ADMIN — Medication 50 MCG: at 08:52

## 2023-11-26 RX ADMIN — ASPIRIN 81 MG CHEWABLE TABLET 81 MG: 81 TABLET CHEWABLE at 08:52

## 2023-11-26 RX ADMIN — OXYBUTYNIN CHLORIDE 5 MG: 5 TABLET ORAL at 08:52

## 2023-11-26 RX ADMIN — LEVOTHYROXINE SODIUM 137 MCG: 137 TABLET ORAL at 07:45

## 2023-11-26 RX ADMIN — CYCLOBENZAPRINE 10 MG: 10 TABLET, FILM COATED ORAL at 21:08

## 2023-11-26 RX ADMIN — DEXAMETHASONE SODIUM PHOSPHATE 4 MG: 4 INJECTION, SOLUTION INTRAMUSCULAR; INTRAVENOUS at 21:25

## 2023-11-26 RX ADMIN — LORAZEPAM 0.5 MG: 2 INJECTION INTRAMUSCULAR; INTRAVENOUS at 04:51

## 2023-11-26 RX ADMIN — AMLODIPINE BESYLATE 5 MG: 5 TABLET ORAL at 08:52

## 2023-11-26 RX ADMIN — LOSARTAN POTASSIUM 50 MG: 50 TABLET, FILM COATED ORAL at 08:52

## 2023-11-26 ASSESSMENT — COGNITIVE AND FUNCTIONAL STATUS - GENERAL
DRESSING REGULAR LOWER BODY CLOTHING: A LITTLE
WALKING IN HOSPITAL ROOM: A LOT
DRESSING REGULAR UPPER BODY CLOTHING: A LITTLE
CLIMB 3 TO 5 STEPS WITH RAILING: A LOT
STANDING UP FROM CHAIR USING ARMS: A LOT
PERSONAL GROOMING: A LOT
MOVING TO AND FROM BED TO CHAIR: A LOT
TOILETING: A LOT
DAILY ACTIVITIY SCORE: 17
HELP NEEDED FOR BATHING: A LITTLE
MOBILITY SCORE: 16

## 2023-11-26 ASSESSMENT — PAIN SCALES - GENERAL
PAINLEVEL_OUTOF10: 0 - NO PAIN
PAINLEVEL_OUTOF10: 0 - NO PAIN

## 2023-11-26 NOTE — CONSULTS
ENT DEPARTMENT CONSULTATION NOTE  Name: Valorie Montelongo  MRN: [id number]  : 1945  Consulting Team: Frank Cota  Reason for Consult: DHT placement, infiltrative metastatic thyroid cancer    History of Present Illness  The patient is a 78 y.o. female who presented to [facility] on 2023 for B/l LE weakness, gait changes and speech difficulties x 10 days, and have progressively gotten worse, found to have metastatic brain lesions, infiltrative thyroid mass into trachea and esophagus. Transfer from [facility] for neurosurgery evaluation  experiencing. MRI brain without contrast () showed multiple brain metastases with surrounding vasogenic edema. MRI whole spine () without evidence of spinal metastasis or cord compression. NSGY consulted on admit, rec no acute surgical intervention and rec to consult Rad Onc. Started on dexamethasone 4mg q6h, SLP consulted-failed MBS. ENT consulted  d/t need for feeding tube and thyroid mass narrowing trachea. Rad Onc consulted  and recs pending. Team unsure if family interested in trach.     Approximately 13 enhancing lesions are seen within the cerebral hemispheres, the largest measuring approximately 2.3 x 2.2 cm involving the left frontal lobe, with associated vasogenic edema and mass effect.  There is no significant midline shift.    Past medical history: metastatic lung cancer (s/p VATS RAYRAY and LLL resection at  23 but now follows with oncology at [facility]), h/o papillary thyroid cancer w/ tracheal invasion (s/p thyroidectomy 2021 with [place] [facility], on oral medication recently d/c'd), oral cancer (s/p resection and XRT with [place] [facility]), HTN, HLD, NSTEMI (21), COPD (on 2-3L at baseline), GERD, and anxiety    Review of Systems  14 point review of systems completed and all negative except as noted in HPI.    Past Medical History  History reviewed. No pertinent past medical history.    Past Surgical History  Past Surgical  History:   Procedure Laterality Date    CT HEAD ANGIO W AND WO IV CONTRAST  4/12/2019    CT HEAD ANGIO W AND WO IV CONTRAST 4/12/2019 [facility]    OTHER SURGICAL HISTORY  07/21/2021    Hysterectomy    OTHER SURGICAL HISTORY  11/02/2021    Cholecystectomy    OTHER SURGICAL HISTORY  11/02/2021    Cataract surgery    OTHER SURGICAL HISTORY  12/03/2021    Lung surgery    OTHER SURGICAL HISTORY  12/09/2021    Pleural biopsy    OTHER SURGICAL HISTORY  12/09/2021    Lung wedge resection    OTHER SURGICAL HISTORY  09/28/2021    Thyroidectomy total    OTHER SURGICAL HISTORY  09/29/2021    Percutaneous endoscopic gastrostomy tube removal    OTHER SURGICAL HISTORY  09/29/2021    Percutaneous endoscopic gastrostomy tube insertion       Allergies  Allergies   Allergen Reactions    Fluoxetine Insomnia    Levofloxacin Itching and Rash       Medications    Current Facility-Administered Medications:     albuterol 2.5 mg /3 mL (0.083 %) nebulizer solution 2.5 mg, 2.5 mg, nebulization, q6h PRN, Triny Mao PA-C    alteplase (Cathflo Activase) injection 2 mg, 2 mg, intra-catheter, PRN, Triny Mao PA-C    amLODIPine (Norvasc) tablet 5 mg, 5 mg, oral, Daily, Triny Mao PA-C, 5 mg at 11/26/23 0852    aspirin chewable tablet 81 mg, 81 mg, oral, Daily, Triny Mao PA-C, 81 mg at 11/26/23 0852    atorvastatin (Lipitor) tablet 10 mg, 10 mg, oral, Nightly, Triny Mao PA-C, 10 mg at 11/25/23 2211    cholecalciferol (Vitamin D-3) tablet 50 mcg, 50 mcg, oral, Daily, Triny Mao PA-C, 50 mcg at 11/26/23 0852    cyclobenzaprine (Flexeril) tablet 10 mg, 10 mg, oral, TID PRN, Triny Mao PA-C, 10 mg at 11/25/23 2211    dexAMETHasone (Decadron) injection 4 mg, 4 mg, intravenous, q6h, Triny Mao PA-C, 4 mg at 11/26/23 0451    docusate sodium (Colace) capsule 100 mg, 100 mg, oral, PRN, Triny Mao PA-C    [Held by provider] enoxaparin (Lovenox) syringe 40 mg, 40 mg, subcutaneous, q24h,  Triny Mao PA-C, 40 mg at 11/24/23 1145    gabapentin (Neurontin) solution 300 mg, 300 mg, oral, q8h GRETCHEN, PEDRO LUIS Ferrell, 300 mg at 11/26/23 0902    HYDROcodone-acetaminophen (Norco) 5-325 mg per tablet 2 tablet, 2 tablet, oral, q4h PRN, Triny Mao PA-C, 2 tablet at 11/25/23 2211    ipratropium (Atrovent) 21 mcg (0.03 %) nasal spray 2 spray, 2 spray, Each Nostril, TID PRN, Triny Mao PA-C    levETIRAcetam in NaCl (iso-os) (Keppra)  mg, 500 mg, intravenous, q12h, Triny Mao PA-C, Stopped at 11/26/23 0906    levothyroxine (Synthroid, Levoxyl) tablet 137 mcg, 137 mcg, oral, Daily before breakfast, Triny Mao PA-C, 137 mcg at 11/26/23 0745    LORazepam (Ativan) injection 0.5 mg, 0.5 mg, intravenous, Daily PRN, Triny Mao PA-C, 0.5 mg at 11/26/23 0451    [Held by provider] LORazepam (Ativan) tablet 1 mg, 1 mg, oral, Daily PRN, Triny Mao PA-C    losartan (Cozaar) tablet 50 mg, 50 mg, oral, Daily, Triny Mao PA-C, 50 mg at 11/26/23 0852    oxybutynin (Ditropan) tablet 5 mg, 5 mg, oral, BID, Triny Mao PA-C, 5 mg at 11/26/23 0852    oxygen (O2) therapy, , inhalation, Continuous PRN - O2/gases, Triny Mao PA-C    [DISCONTINUED] pantoprazole (ProtoNix) EC tablet 40 mg, 40 mg, oral, Daily before breakfast **OR** pantoprazole (ProtoNix) injection 40 mg, 40 mg, intravenous, Daily before breakfast, PEDRO LUIS Singer, 40 mg at 11/26/23 0632    Family History  No family history on file.    Social History  Social History     Socioeconomic History    Marital status: Single     Spouse name: Not on file    Number of children: Not on file    Years of education: Not on file    Highest education level: Not on file   Occupational History    Not on file   Tobacco Use    Smoking status: Not on file    Smokeless tobacco: Not on file   Substance and Sexual Activity    Alcohol use: Not on file    Drug use: Not on file    Sexual activity: Not on  file   Other Topics Concern    Not on file   Social History Narrative    Not on file     Social Determinants of Health     Financial Resource Strain: Not on file   Food Insecurity: Not on file   Transportation Needs: Not on file   Physical Activity: Not on file   Stress: Not on file   Social Connections: Not on file   Intimate Partner Violence: Not on file   Housing Stability: Not on file       Vital Signs  Vitals:    11/26/23 1039   BP: 138/84   Pulse: 63   Resp: 18   Temp: 36.3 °C (97.4 °F)   SpO2: 100%       Physical Examination  GEN: The patient appears stated age in no acute distress  VOICE: slight hypophonia  RESP: Unlabored on room air with no audible stertor or stridor  CV: Clinically well perfused   EYES: EOM grossly intact with no scleral icterus  NEURO: Alert and oriented with no focal deficits and CN II-XII symmetric and intact bilaterally, confused  HEAD: Scalp is normocephalic and atraumatic  FACE: No abrasions or lacerations, no maxillary or mandibular stepoffs  SAL: No parotid or submandibular masses or tenderness to palpation and clear saliva expressed from ducts  EARS: Normal external ears, external auditory canals, and TMs to otoscopy, normal hearing to spoken voice  NOSE: External nose appears normal, no significant septal deviation, anterior rhinoscopy limited with no active bleeding or lesions  OC: Normal lips, normal buccal mucosa, normal alveolar ridge, normal floor of mouth, normal tongue, normal hard palate, normal retromolar trigone  OP: normal pharyngeal walls, normal soft palate  NECK: Trachea midline,     Laboratory and Data  Results for orders placed or performed during the hospital encounter of 11/23/23 (from the past 24 hour(s))   CBC and Auto Differential   Result Value Ref Range    WBC 7.7 4.4 - 11.3 x10*3/uL    nRBC 0.0 0.0 - 0.0 /100 WBCs    RBC 4.14 4.00 - 5.20 x10*6/uL    Hemoglobin 12.7 12.0 - 16.0 g/dL    Hematocrit 39.0 36.0 - 46.0 %    MCV 94 80 - 100 fL    MCH 30.7 26.0 -  34.0 pg    MCHC 32.6 32.0 - 36.0 g/dL    RDW 11.8 11.5 - 14.5 %    Platelets 184 150 - 450 x10*3/uL    Neutrophils % 90.2 40.0 - 80.0 %    Immature Granulocytes %, Automated 0.4 0.0 - 0.9 %    Lymphocytes % 4.4 13.0 - 44.0 %    Monocytes % 5.0 2.0 - 10.0 %    Eosinophils % 0.0 0.0 - 6.0 %    Basophils % 0.0 0.0 - 2.0 %    Neutrophils Absolute 6.90 (H) 1.60 - 5.50 x10*3/uL    Immature Granulocytes Absolute, Automated 0.03 0.00 - 0.50 x10*3/uL    Lymphocytes Absolute 0.34 (L) 0.80 - 3.00 x10*3/uL    Monocytes Absolute 0.38 0.05 - 0.80 x10*3/uL    Eosinophils Absolute 0.00 0.00 - 0.40 x10*3/uL    Basophils Absolute 0.00 0.00 - 0.10 x10*3/uL   Comprehensive Metabolic Panel   Result Value Ref Range    Glucose 135 (H) 74 - 99 mg/dL    Sodium 145 136 - 145 mmol/L    Potassium 4.2 3.5 - 5.3 mmol/L    Chloride 108 (H) 98 - 107 mmol/L    Bicarbonate 30 21 - 32 mmol/L    Anion Gap 11 10 - 20 mmol/L    Urea Nitrogen 40 (H) 6 - 23 mg/dL    Creatinine 0.68 0.50 - 1.05 mg/dL    eGFR 90 >60 mL/min/1.73m*2    Calcium 9.1 8.6 - 10.6 mg/dL    Albumin 3.5 3.4 - 5.0 g/dL    Alkaline Phosphatase 64 33 - 136 U/L    Total Protein 5.2 (L) 6.4 - 8.2 g/dL    AST 16 9 - 39 U/L    Bilirubin, Total 0.6 0.0 - 1.2 mg/dL    ALT 19 7 - 45 U/L       PROCEDURE NOTE:  Nasopharyngolaryngoscopy    For better visualization because of the patients need for a feeding tube and infiltrative thyroid cancer, a flexible fiberoptic nasopharyngolaryngoscopy was performed. Patient was correctly identified and verbal consent obtained.  After topical anesthesia with atomized 4% Lidocaine with Afrin, the flexible scope was introduced into the left naris.    The following areas were visualized:  Nasal septum, turbinates, nasopharynx, oropharynx, hypopharynx, soft palate, base of tongue, epiglottis, and larynx.    These structures were found to be normal with the following notable findings:     -DHT placed under direct visualization  -L TVC immobile  -Significant  pooling of secretions with silent aspiration    Procedure Note: Dobhoff Tube (DHT) placement  Verbal informed consent was obtained from the patient/patient's guardian. 4% lidocaine mixed with phenylephrine was prepared and dripped into the nose. It was placed in the right and left nares. Following an appropriate amount of time to allow for adequate anesthesia, a coretrak-compatible DHT was placed into the patient's left naris. The DHT was advanced into the nasopharynx and, after flexion of the patients neck, the DHT tube was passed into the esophagus. Advancement of the tube continued to 65 cm. The tube was Bridled to secure the DHT. The patient tolerated the procedure well.     Assessment  Valorie Montelongo is a 78 y.o. female who has a complex cancer history with an invasive thyroid cancer since 2021, hx of oral cancer s/p rsct and XRT in 2021, now with 13 enhancing lesions on brain imaging concerning for brain metastases. Currently medical oncology and radiation oncology are still working on treatment planning and anticipate the patient will need a PEG given failed MBS and poor prognosis for return of swallow function. PEG unable to be placed until Monday 11/27, so ENT consulted on 11/25 for placement of DHT as family very concerned about lack of enteral nutrition. DHT placed over direct visualization. On scope exam noted to have immobile L TVC.     Recommendations  -KUB to confirm DHT  -Will follow up rad onc / med onc plan for treatment -- please engage ENT if there is a possible need and family is amenable to a tracheostomy  - Consider obtaining a CTN to better visualize the tracheal / esophageal involvement of the PTC  - Patient may benefit from a vocal cord injection given her L TVC immobility and failed MBS. Can consider doing this once treatment plan is established.   -Patient will be seen and staffed with an attending on 11/26.     Note not final until signed by an attending.     Sahil Matos MD  Dept. of  Otolaryngology - Head and Neck Surgery, PGY-2  ENT Consults: [id number]  ENT Overnight (5p-6a), and Weekends: [id number]  ENT Head and Neck Surgery Phone: [phone]  ENT Peds: [id number]  ENT Outpatient scheduling number: [phone]       Staffing update:  Patient seen and discussed with Dr. Robertson.  CT chest reviewed with Dr. Robertson, thyroid mass likely is unresectable, and this was discussed with the patient and family.  Would consider FNA of thyroid mass to assess for targetable mutations that could benefit from additional medical treatment.  Discussed with patient the importance of prioritizing brain radiation over treating the neck.  Patient will be added onto our thyroid tumor board for further discussion of treatment options.  Also discussed with patient the recommendation for vocal cord injection which may be performed here on an inpatient basis by one of our laryngologist versus by Dr. Perkins and his team if patient pursues transfer to [facility] for radiation treatment.    Tommy Lombardo MD, PGY-2  ENT: [id number]      ATTENDING ATTESTATION    I saw and evaluated the patient. I personally obtained the key and critical portions of the history and physical exam or was physically present for key and critical portions performed by the resident/fellow. I reviewed the resident/fellow's documentation and discussed the patient with the resident/fellow. I agree with the resident/fellow's medical decision making as documented in the note.    I reviewed the patient's history and performed exam.  I reviewed her imaging consistent with +brain mets (multiple) associated with associated brain edema.  Reviewed neck imaging which is consistent with recurrent neck mass although at this time I feel the most critical aspect is treatment for her brain metastasis.  She had successful nasogastric feeding tube placement.  Plan for PEG placement for more permanent enteral feedings.  Will arrange for possible VC injection which can be  scheduled by Dr. Perkins which is her local ENT.  I did reach out to Dr. Perkins and relayed the updates regarding her brain metastasis.  Otherwise agree with the plan as above.    Funmilayo Robertson MD

## 2023-11-26 NOTE — PROGRESS NOTES
"Valorie Montelongo is a 78 y.o. female on day 3 of admission presenting with Metastatic adenocarcinoma (CMS/HCC).    Subjective   Patient seen at bedside. States that she is doing well today. States that she continues to be hungry but is tolerating her TF well. States that she is not experiencing any nausea or vomiting since TF was started yesterday. Denies any fevers/chills, SOB, CP, heart palpitations, headaches dizziness or vision changes        Objective     Physical Exam  HENT:      Head: Normocephalic.      Nose: Nose normal.      Mouth/Throat:      Mouth: Mucous membranes are moist.   Eyes:      Pupils: Pupils are equal, round, and reactive to light.   Cardiovascular:      Rate and Rhythm: Normal rate and regular rhythm.   Pulmonary:      Effort: Pulmonary effort is normal.   Abdominal:      Palpations: Abdomen is soft.   Musculoskeletal:         General: Normal range of motion.      Cervical back: Normal range of motion.   Skin:     General: Skin is warm.   Neurological:      Mental Status: She is alert and oriented to person, place, and time.   Psychiatric:         Mood and Affect: Mood normal.         Behavior: Behavior normal.         Last Recorded Vitals  Blood pressure 142/68, pulse 62, temperature 35.8 °C (96.4 °F), temperature source Temporal, resp. rate 18, height 1.626 m (5' 4\"), weight 62.6 kg (138 lb), SpO2 94 %.  Intake/Output last 3 Shifts:  I/O last 3 completed shifts:  In: 1692.5 (27 mL/kg) [I.V.:1312.5 (21 mL/kg); NG/GT:180; IV Piggyback:200]  Out: 250 (4 mL/kg) [Urine:250 (0.1 mL/kg/hr)]  Weight: 62.6 kg     Relevant Results                        Malnutrition Diagnosis Status: New  Malnutrition Diagnosis: Moderate malnutrition related to chronic disease or condition  As Evidenced by: moderate muscle mass loss and moderate subcutanous fat loss  I agree with the dietitian's malnutrition diagnosis.      Assessment/Plan   Principal Problem:    Metastatic adenocarcinoma (CMS/HCC)  Active Problems:   "  Pulmonary cancer (CMS/HCC)    HTN (hypertension)    Hyperlipidemia    MI (myocardial infarction) (CMS/HCC)    Chronic obstructive pulmonary disease (CMS/HCC)    History of home oxygen therapy    Gastroesophageal reflux disease    Anxiety    Valorie Montelongo is a 79 y/o F w h/o metastatic lung cancer (s/p VATS RAYRAY and LLL resection at  11/18/21 but now follows with oncology at [facility]), h/o papillary thyroid cancer (s/p thyroidectomy 7/2021), oral cancer (s/p resection and XRT), HTN, HLD, NSTEMI (11/25/21), COPD (on 2-3L at baseline), GERD, and anxiety who presented as a transfer from [facility] for neurosurgery evaluation as CT head at OSH showed new metastatic brain lesions. Patient has been experiencing b/l LE weakness, gait changes and speech difficulties x 10 days, and have progressively gotten worse. MRI brain without contrast (11/23) showed multiple brain metastases with surrounding vasogenic edema. MRI whole spine (11/23) without evidence of spinal metastasis or cord compression. NSGY consulted on admit, rec no acute surgical intervention and rec to consult Rad Onc. Started on dexamethasone 4mg q6h + keppra 500mg BID per NSGY, also received MRI brain with and without 11/24. SLP consulted-failed MBSS. 11/25 ENT was consulted for Dobbhoff placement d/t thyroid mass narrowing trachea. Plan to address possible PEG at a later time. Rad Onc consulted 11/24-recs pending. Oncology consulted 11/24-recs pending. Plan to discuss with all the teams Monday 11/27 and come up with prognosis, and plan. Did discuss option of hospice with patient and her family if she decides she doesn't want to pursue any treatment options.      # New brain metastases / bilateral weakness   - hx of metastatic lung cancer and metastatic papillary thyroid cancer; experiencing progressive b/l LE weakness, speech and gait changes  - OSH 11/22/23 CT head shows multiple masses and vasogenic edema- patient transferred to [facility]  for further evaluation   - CT whole spine without contrast (11/23/23): No CT evidence of metastatic disease in the spine, degernative changes and scoliosis noted   - MRI whole spine without contrast (11/23/23): no lesions noted within the cervical, thoracic or lumbar spine, no significant spinal canal or neural foraminal stenosis   - MRI brain without contrast (11/23/23): numerus cerebral lesions scattered throughout the b/l cerebral hemispheres with significant sorrouning vasogenic edema, components of hemorrhage - however exam is somewhat limited due to non-contrast examination   - MRI brain with and without contrast under anesthesia 11/24   - NSGY consulted  on admit - rec no acute surgical intervention and Rad Onc consult, start dex + keppra + MRI brain w/wo   - (11/23- current) continue dexamethasone 4mg q6h + PPI support per neurosurgery recs   - (11/23- current) continue Keppra 500mg BID per neurosurgery recs- currently IV, plan to transition to PO pending SLP eval   - Q4 neuro checks   - PT/OT consulted- recs pending   - Rad Onc consulted 11/24 -plan for recs 11/27   - 11/24 oncology consulted-plan for recs 11/27      # lung adenocarcinoma   - follows with Dr. David Mathias at [facility] - emailed about admission and above radiographic findings 11/23   - s/p VATs RAYRAY and LLL resection at  on 11/18/2021   - oncological history limited as community records have limited oncologic information   -Alectinib 1/2022 - 12/2022. It was stopped at that time due to what was thought to be pneumonitis. No further treatment since then  - CT chest with contrast (11/23): increased soft tissue thickening in the left thyoid surgical bed with probable involvement of the left cervical lymph node, dozens of bilateral pulmonary nodules of which a few are new from previous CT (11/2021), new enlargement of aortopulmonary window lymph nose and right interlobar lymph nodes suspicious for metastatic disease      # H/o papillary thyroid cancer  / oral cancer / acquired hypothyroidism   - s/p s/p thyroidectomy 7/2021  - TSH level 0.04 11/24  - T4 1.38   - Continue with home levothyroxine 137 mcg daily before breakfast     # progressive dysphagia   - SLP consulted on admission I/s/o new brain mets and occasional reported dysphagia  - MBSS 11/25 showing severe impairment  - 11/25 ENT placed Dobbhoff then likely transition to PEG  - 11/25 ENT consulted for Dobbhoff placement d/t thyroid mass invading trachea  - Dietician consulted for TF recs   - 11/25 started on TF Isosource 1.5 goal 45 mL/hour        - available meds transitioned to IV     # HTN  - continue with home amlodipine 5mg daily, home Irbesartan substituted for losartan 50mg daily while inpatient      # CAD with H /o NSTEMI  - NSTEMI 11/25/2021   - continue with home atorvastatin 10mg nightly and daily ASA     # COPD  - former smoker, baseline oxygen continuous 2-3L at home   - continue with home Ipratropium 21mcg 2 sprays TID PRN shortness of breath   - continue with home albuterol 90mcg q6 hours PRN wheezing      # chronic back pain   - CT spine and MRI without contrast (11/23/23): without evidence of spinal metastasis or cord compression   - continue home gabapentin 300mg TID, Norco 5-325mg q4 hours PRN moderate pain, flexeril 10mg TID      # Overreactive bladder   - Home Toviaz substituted for oxybutynin 5mg BID while inpatient      # Anxiety   - continue with home ativan PRN anxiety      # DVT prophy / misc  - hold prophy Lovenox with MRI brain without contrast suspicious components of hemorrhage  - PPI support with dexamethasone      #Dispo   - FULL CODE, confirmed on admission   - Primary oncologist: Dr. Mathias - notified of admission 11/23   - DC pending NSGY and Rad Onc recs, PT/OT recs, feeding tube placement and plan   -  José Manuel: [phone] - updated at bedside 11/24            I spent 60 minutes in the professional and overall care of this patient.      Aure Whiting,  APRN-CNP

## 2023-11-26 NOTE — CARE PLAN
Problem: Skin  Goal: Decreased wound size/increased tissue granulation at next dressing change  Outcome: Progressing  Goal: Participates in plan/prevention/treatment measures  Outcome: Progressing  Goal: Prevent/manage excess moisture  Outcome: Progressing  Goal: Prevent/minimize sheer/friction injuries  Outcome: Progressing  Goal: Promote/optimize nutrition  Outcome: Progressing  Goal: Promote skin healing  Outcome: Progressing     Problem: Fall/Injury  Goal: Not fall by end of shift  Outcome: Progressing  Goal: Be free from injury by end of the shift  Outcome: Progressing  Goal: Verbalize understanding of personal risk factors for fall in the hospital  Outcome: Progressing  Goal: Verbalize understanding of risk factor reduction measures to prevent injury from fall in the home  Outcome: Progressing  Goal: Use assistive devices by end of the shift  Outcome: Progressing  Goal: Pace activities to prevent fatigue by end of the shift  Outcome: Progressing     Problem: Diet Management  Goal: Maintain stable weight  Outcome: Progressing   The patient's goals for the shift include Rest and comfort    The clinical goals for the shift include maintain safety    Over the shift, the patient did not make progress toward the following goals. Barriers to progression include nutrition. Recommendations to address these barriers include MD cara.

## 2023-11-27 LAB
ALBUMIN SERPL BCP-MCNC: 3.4 G/DL (ref 3.4–5)
ALP SERPL-CCNC: 59 U/L (ref 33–136)
ALT SERPL W P-5'-P-CCNC: 15 U/L (ref 7–45)
ANION GAP SERPL CALC-SCNC: 10 MMOL/L (ref 10–20)
AST SERPL W P-5'-P-CCNC: 12 U/L (ref 9–39)
BASOPHILS # BLD AUTO: 0.01 X10*3/UL (ref 0–0.1)
BASOPHILS NFR BLD AUTO: 0.1 %
BILIRUB SERPL-MCNC: 0.4 MG/DL (ref 0–1.2)
BUN SERPL-MCNC: 37 MG/DL (ref 6–23)
CALCIUM SERPL-MCNC: 8.8 MG/DL (ref 8.6–10.6)
CHLORIDE SERPL-SCNC: 107 MMOL/L (ref 98–107)
CO2 SERPL-SCNC: 31 MMOL/L (ref 21–32)
CREAT SERPL-MCNC: 0.49 MG/DL (ref 0.5–1.05)
EOSINOPHIL # BLD AUTO: 0 X10*3/UL (ref 0–0.4)
EOSINOPHIL NFR BLD AUTO: 0 %
ERYTHROCYTE [DISTWIDTH] IN BLOOD BY AUTOMATED COUNT: 11.9 % (ref 11.5–14.5)
GFR SERPL CREATININE-BSD FRML MDRD: >90 ML/MIN/1.73M*2
GLUCOSE SERPL-MCNC: 92 MG/DL (ref 74–99)
HCT VFR BLD AUTO: 38.4 % (ref 36–46)
HGB BLD-MCNC: 12.4 G/DL (ref 12–16)
IMM GRANULOCYTES # BLD AUTO: 0.04 X10*3/UL (ref 0–0.5)
IMM GRANULOCYTES NFR BLD AUTO: 0.3 % (ref 0–0.9)
LYMPHOCYTES # BLD AUTO: 0.36 X10*3/UL (ref 0.8–3)
LYMPHOCYTES NFR BLD AUTO: 3.1 %
MCH RBC QN AUTO: 30.5 PG (ref 26–34)
MCHC RBC AUTO-ENTMCNC: 32.3 G/DL (ref 32–36)
MCV RBC AUTO: 95 FL (ref 80–100)
MONOCYTES # BLD AUTO: 0.81 X10*3/UL (ref 0.05–0.8)
MONOCYTES NFR BLD AUTO: 6.9 %
NEUTROPHILS # BLD AUTO: 10.49 X10*3/UL (ref 1.6–5.5)
NEUTROPHILS NFR BLD AUTO: 89.6 %
NRBC BLD-RTO: 0.2 /100 WBCS (ref 0–0)
PLATELET # BLD AUTO: 176 X10*3/UL (ref 150–450)
POTASSIUM SERPL-SCNC: 4.2 MMOL/L (ref 3.5–5.3)
PROT SERPL-MCNC: 5.5 G/DL (ref 6.4–8.2)
RBC # BLD AUTO: 4.06 X10*6/UL (ref 4–5.2)
SODIUM SERPL-SCNC: 144 MMOL/L (ref 136–145)
WBC # BLD AUTO: 11.7 X10*3/UL (ref 4.4–11.3)

## 2023-11-27 PROCEDURE — C9113 INJ PANTOPRAZOLE SODIUM, VIA: HCPCS | Performed by: NURSE PRACTITIONER

## 2023-11-27 PROCEDURE — 2500000004 HC RX 250 GENERAL PHARMACY W/ HCPCS (ALT 636 FOR OP/ED): Performed by: NURSE PRACTITIONER

## 2023-11-27 PROCEDURE — 36415 COLL VENOUS BLD VENIPUNCTURE: CPT

## 2023-11-27 PROCEDURE — 99223 1ST HOSP IP/OBS HIGH 75: CPT | Performed by: STUDENT IN AN ORGANIZED HEALTH CARE EDUCATION/TRAINING PROGRAM

## 2023-11-27 PROCEDURE — 2500000001 HC RX 250 WO HCPCS SELF ADMINISTERED DRUGS (ALT 637 FOR MEDICARE OP)

## 2023-11-27 PROCEDURE — 99232 SBSQ HOSP IP/OBS MODERATE 35: CPT | Performed by: NEUROLOGICAL SURGERY

## 2023-11-27 PROCEDURE — 97530 THERAPEUTIC ACTIVITIES: CPT | Mod: GP,CQ

## 2023-11-27 PROCEDURE — 99233 SBSQ HOSP IP/OBS HIGH 50: CPT | Performed by: PHYSICIAN ASSISTANT

## 2023-11-27 PROCEDURE — 99232 SBSQ HOSP IP/OBS MODERATE 35: CPT | Performed by: PHYSICIAN ASSISTANT

## 2023-11-27 PROCEDURE — 85025 COMPLETE CBC W/AUTO DIFF WBC: CPT

## 2023-11-27 PROCEDURE — 97116 GAIT TRAINING THERAPY: CPT | Mod: GP,CQ

## 2023-11-27 PROCEDURE — 2500000004 HC RX 250 GENERAL PHARMACY W/ HCPCS (ALT 636 FOR OP/ED)

## 2023-11-27 PROCEDURE — 97110 THERAPEUTIC EXERCISES: CPT | Mod: GP,CQ

## 2023-11-27 PROCEDURE — 1170000001 HC PRIVATE ONCOLOGY ROOM DAILY

## 2023-11-27 PROCEDURE — 80053 COMPREHEN METABOLIC PANEL: CPT

## 2023-11-27 RX ORDER — LOSARTAN POTASSIUM 50 MG/1
50 TABLET ORAL DAILY
Status: DISCONTINUED | OUTPATIENT
Start: 2023-11-28 | End: 2023-12-02 | Stop reason: HOSPADM

## 2023-11-27 RX ORDER — LEVOTHYROXINE SODIUM 137 UG/1
137 TABLET ORAL
Status: DISCONTINUED | OUTPATIENT
Start: 2023-11-28 | End: 2023-12-02 | Stop reason: HOSPADM

## 2023-11-27 RX ORDER — AMLODIPINE BESYLATE 5 MG/1
5 TABLET ORAL DAILY
Status: DISCONTINUED | OUTPATIENT
Start: 2023-11-28 | End: 2023-12-02 | Stop reason: HOSPADM

## 2023-11-27 RX ADMIN — DEXAMETHASONE SODIUM PHOSPHATE 4 MG: 4 INJECTION, SOLUTION INTRAMUSCULAR; INTRAVENOUS at 21:14

## 2023-11-27 RX ADMIN — DEXAMETHASONE SODIUM PHOSPHATE 4 MG: 4 INJECTION, SOLUTION INTRAMUSCULAR; INTRAVENOUS at 11:16

## 2023-11-27 RX ADMIN — GABAPENTIN 300 MG: 250 SOLUTION ORAL at 21:14

## 2023-11-27 RX ADMIN — ATORVASTATIN CALCIUM 10 MG: 10 TABLET, FILM COATED ORAL at 21:14

## 2023-11-27 RX ADMIN — OXYBUTYNIN CHLORIDE 5 MG: 5 TABLET ORAL at 21:14

## 2023-11-27 RX ADMIN — LEVOTHYROXINE SODIUM 137 MCG: 137 TABLET ORAL at 07:23

## 2023-11-27 RX ADMIN — LORAZEPAM 0.5 MG: 2 INJECTION INTRAMUSCULAR; INTRAVENOUS at 14:38

## 2023-11-27 RX ADMIN — DEXAMETHASONE SODIUM PHOSPHATE 4 MG: 4 INJECTION, SOLUTION INTRAMUSCULAR; INTRAVENOUS at 16:22

## 2023-11-27 RX ADMIN — PANTOPRAZOLE SODIUM 40 MG: 40 INJECTION, POWDER, FOR SOLUTION INTRAVENOUS at 05:20

## 2023-11-27 RX ADMIN — DEXAMETHASONE SODIUM PHOSPHATE 4 MG: 4 INJECTION, SOLUTION INTRAMUSCULAR; INTRAVENOUS at 05:20

## 2023-11-27 RX ADMIN — CYCLOBENZAPRINE 10 MG: 10 TABLET, FILM COATED ORAL at 21:14

## 2023-11-27 RX ADMIN — LEVETIRACETAM 500 MG: 5 INJECTION INTRAVENOUS at 21:14

## 2023-11-27 RX ADMIN — LEVETIRACETAM 500 MG: 5 INJECTION INTRAVENOUS at 11:16

## 2023-11-27 RX ADMIN — GABAPENTIN 300 MG: 250 SOLUTION ORAL at 05:20

## 2023-11-27 ASSESSMENT — COGNITIVE AND FUNCTIONAL STATUS - GENERAL
CLIMB 3 TO 5 STEPS WITH RAILING: TOTAL
TURNING FROM BACK TO SIDE WHILE IN FLAT BAD: A LOT
MOVING FROM LYING ON BACK TO SITTING ON SIDE OF FLAT BED WITH BEDRAILS: A LOT
STANDING UP FROM CHAIR USING ARMS: A LOT
MOVING TO AND FROM BED TO CHAIR: A LOT
WALKING IN HOSPITAL ROOM: A LOT
MOBILITY SCORE: 11

## 2023-11-27 ASSESSMENT — PAIN SCALES - GENERAL
PAINLEVEL_OUTOF10: 0 - NO PAIN
PAINLEVEL_OUTOF10: 0 - NO PAIN

## 2023-11-27 ASSESSMENT — PAIN - FUNCTIONAL ASSESSMENT: PAIN_FUNCTIONAL_ASSESSMENT: 0-10

## 2023-11-27 NOTE — CONSULTS
Reason For Consult  Hx of metastatic NSCLC and papillary thyroid carcinoma, presenting with brain metastases     History Of Present Illness  Valorie Montelongo is a 78 y.o. female presenting with new brain metastases.     Mrs. Montelongo is a 79yo F with PMH of metastatic NSCLC (adeno) with ALK fusion, metastatic papillary thyroid carcinoma, distant salivary gland cancer. She presented with ~2 weeks of new neurologic symptoms found to have several new brain metastases for which she was transferred to [facility] [place] for neurosurgical evaluation. Oncology is consulted for treatment recommendations.     Onc history:  Salivary gland cancer ~20 years ago s/p resection and XRT, no chemotherapy  Papillary thyroid cancer s/p thyroidectomy 7/2021. Denies any other treatment other than levothyroxine.   NSCLC: underwent left VATS, RAYRAY/LLL wedge resection (Dr. Mcarthur) 11/18/2021. Pathology showed pleural based lung adenocarcinoma ([id number]) and metastatic papillary thyroid carcinoma. PD-L1 TPS 2% and NGS showed EML4-ALK fusion mutation. She was started on alectinib ~1/2022. This was discontinued ~12/2022 after being hospitalized with pneumonitis vs bilateral pneumonia. She has been off treatment since undergoing active surveillance.     Patient and family at bedside. They report patient was in her usual state of health up until about 3 weeks ago. Developed sudden onset weakness requiring using a walker for the first time. Eventually presented to OSH and found to have new brain mets. They corroborated onc history as detailed above. Reports she has been having ongoing dysphagia for about a year.        Past Medical History  She has no past medical history on file.    Surgical History  She has a past surgical history that includes Other surgical history (07/21/2021); Other surgical history (11/02/2021); Other surgical history (11/02/2021); Other surgical history (12/03/2021); Other surgical history (12/09/2021); Other surgical history (12/09/2021);  "Other surgical history (09/28/2021); Other surgical history (09/29/2021); Other surgical history (09/29/2021); and CT angio head w and wo IV contrast (4/12/2019).     Social History  She has no history on file for tobacco use, alcohol use, and drug use.  Former smoker, quite 6/2021   Denies ETOH or illicit drug use now or ever     Family History  No family history on file.     Allergies  Fluoxetine and Levofloxacin    Review of Systems  Negative other than per HPI.      Physical Exam  General: awake, alert, conversant, appears stated age  HEENT: pupils equal and round, no scleral icterus or conjunctivitis, dobhoff in place with TF running  Skin: no suspect lesions or rashes noted on visible skin  Chest: ctab, normal respiratory effort  Cardiac: regular rate and rhythm, normal s1, s2, no M/R/G, no JVD  Abdomen: soft, ND, NT, no involuntary guarding  EXT: no peripheral edema, no asymmetry noted  MSK: no focal joint swelling noted  Neuro: AOx4  Psych: coherent thought process, appropriate mood and affect      Last Recorded Vitals  Blood pressure 150/74, pulse 55, temperature 36.5 °C (97.7 °F), temperature source Temporal, resp. rate 16, height 1.626 m (5' 4\"), weight 62.6 kg (138 lb), SpO2 100 %.    Relevant Results  Results for orders placed or performed during the hospital encounter of 11/23/23 (from the past 24 hour(s))   CBC and Auto Differential   Result Value Ref Range    WBC 11.7 (H) 4.4 - 11.3 x10*3/uL    nRBC 0.2 (H) 0.0 - 0.0 /100 WBCs    RBC 4.06 4.00 - 5.20 x10*6/uL    Hemoglobin 12.4 12.0 - 16.0 g/dL    Hematocrit 38.4 36.0 - 46.0 %    MCV 95 80 - 100 fL    MCH 30.5 26.0 - 34.0 pg    MCHC 32.3 32.0 - 36.0 g/dL    RDW 11.9 11.5 - 14.5 %    Platelets 176 150 - 450 x10*3/uL    Neutrophils % 89.6 40.0 - 80.0 %    Immature Granulocytes %, Automated 0.3 0.0 - 0.9 %    Lymphocytes % 3.1 13.0 - 44.0 %    Monocytes % 6.9 2.0 - 10.0 %    Eosinophils % 0.0 0.0 - 6.0 %    Basophils % 0.1 0.0 - 2.0 %    Neutrophils " Absolute 10.49 (H) 1.60 - 5.50 x10*3/uL    Immature Granulocytes Absolute, Automated 0.04 0.00 - 0.50 x10*3/uL    Lymphocytes Absolute 0.36 (L) 0.80 - 3.00 x10*3/uL    Monocytes Absolute 0.81 (H) 0.05 - 0.80 x10*3/uL    Eosinophils Absolute 0.00 0.00 - 0.40 x10*3/uL    Basophils Absolute 0.01 0.00 - 0.10 x10*3/uL   Comprehensive Metabolic Panel   Result Value Ref Range    Glucose 92 74 - 99 mg/dL    Sodium 144 136 - 145 mmol/L    Potassium 4.2 3.5 - 5.3 mmol/L    Chloride 107 98 - 107 mmol/L    Bicarbonate 31 21 - 32 mmol/L    Anion Gap 10 10 - 20 mmol/L    Urea Nitrogen 37 (H) 6 - 23 mg/dL    Creatinine 0.49 (L) 0.50 - 1.05 mg/dL    eGFR >90 >60 mL/min/1.73m*2    Calcium 8.8 8.6 - 10.6 mg/dL    Albumin 3.4 3.4 - 5.0 g/dL    Alkaline Phosphatase 59 33 - 136 U/L    Total Protein 5.5 (L) 6.4 - 8.2 g/dL    AST 12 9 - 39 U/L    Bilirubin, Total 0.4 0.0 - 1.2 mg/dL    ALT 15 7 - 45 U/L         Assessment/Plan   Mrs. Montelongo is a 77yo F with PMH of metastatic NSCLC (adeno) with ALK fusion, metastatic papillary thyroid carcinoma, distant salivary gland cancer. She presented with ~2 weeks of new neurologic symptoms found to have several new brain metastases for which [place] was [place] [facility] [place]. Oncology is consulted for treatment recommendations.     Patient has been off treatment for her metastatic lung cancer for almost a year now presenting with new brain lesions. CT also shows interval soft tissue thickening in her thyroid surgical bed and increases in pulmonary nodules (size and number), previously biopsy proven to consist of both lung adeno and metastatic papillary thyroid carcinoma. Given her significant changes since last biopsy, would recommend obtaining tissue.     Discussed these recommendations with the patient and her family (/daughter). They are appreciative of our explanation and our recommendations but would strongly prefer to have any other care closer to home at [facility] [place] if  possible. They state the patient was transferred for neurosurgical referral with currently no procedures planned.     Recommendations:  - ideally would obtain brain biopsy; if patient/family decline can repeat lung biopsy (bronch or IR)  - also recommend thyroid surgical bed biopsy to evaluate for de-differentiation and to send NGS  - would recommend liquid NGS; can do this here if patient is not transferred soon   - if lung adeno genomics still show ALK fusion, would recommend lorlatinib or re-challenging with alectinib  - appreciate rad onc input   - appreciate primary team arranging transfer if medically appropriate per patient/family wishes and [place] [facility] [place]    Thank you for the consult. Please page [phone] with any questions. Patient seen and discussed with Dr. Leo.     Roscoe Ayala MD  PGY-4 hem-onc fellow

## 2023-11-27 NOTE — PROGRESS NOTES
"Physical Therapy    Physical Therapy Treatment    Patient Name: Valorie Montelongo  MRN: [id number]  Today's Date: 11/27/2023  Time Calculation  Start Time: 0836  Stop Time: 0924  Time Calculation (min): 48 min       Assessment/Plan   PT Assessment  PT Assessment Results: Decreased strength, Decreased endurance, Impaired balance, Decreased mobility, Decreased cognition  Strengths: Support of Caregivers  End of Session Communication: Bedside nurse  Assessment Comment: Pt. tolerated treatment well today  End of Session Patient Position:  (Supine in bed however set pt. into chair position)  PT Plan  Inpatient/Swing Bed or Outpatient: Inpatient  PT Plan  PT Plan: Skilled PT  PT Frequency: 3 times per week  PT Discharge Recommendations: Moderate intensity level of continued care  PT - OK to Discharge: Yes      General Visit Information:   PT  Visit  PT Received On: 11/27/23  General  Past Medical History Relevant to Rehab: 78 y.o. female with h/o HTN HLD NSTEMI 2/2 pneumothorax (11/25/21), lung adenocarinoma s/p L VATS, RAYRAY and LLL wedge resection (11/18/21), COPD (on 2L), oral cancer s/p resection and rads, GERD, papillary thyroid cancer s/p thyroidectomy 7/2021, hypothyroidism OAB, chronic pain syndrome, anxiety  Family/Caregiver Present: Yes  Caregiver Feedback:  present  Prior to Session Communication: Bedside nurse  Patient Position Received: Bed, 3 rail up, Alarm on  General Comment: Initially pt. stated 'Im going for a test\" and then states \"I'm leaving today\" RN is not aware of either but stated that pt. is confused at times. (brain mets) Pt. willing to participate however. Pt supine in bed but leans left- dependent adjustment however pt. reverts to same position.    Subjective   Precautions:  Precautions  Medical Precautions: Seizure precautions, Fall precautions  Vital Signs:  Vital Signs  Heart Rate: 54  Heart Rate Source: Monitor  SpO2: 100 %  Patient Position: Lying    Objective   Pain:  Pain " Assessment  Pain Assessment: 0-10  Pain Score: 0 - No pain  Cognition:  Cognition  Overall Cognitive Status:  (Pt able to state Name, place, Month and year correctly.)  Postural Control:  Postural Control  Trunk Control: leans lefft.  Extremity/Trunk Assessments:                    Activity Tolerance:     Treatments:  Therapeutic Exercise  Therapeutic Exercise Performed:  (Supine bilatt le ex AP'S, QS, SAQ'S, HS, AND ABD X 15 REPS. Right le weaker than Lefft le.)    Bed Mobility  Bed Mobility: Yes  Bed Mobility 1  Bed Mobility 1: Rolling right, Side lying right to sit  Level of Assistance 1: Moderate assistance  Bed Mobility Comments 1: verbal and manual assist required. .  Bed Mobility 2  Bed Mobility  2: Sitting to supine  Level of Assistance 2:  (Mod assist of 2)    Ambulation/Gait Training  Ambulation/Gait Training Performed: Yes  Ambulation/Gait Training 1  Surface 1: Level tile (First attempt pt. took 6 side steps using a wh. walker and min/mod assist as pt. tends to flex forward - cues given to correct- Pt. wanting to walk forward however gave pt. a rest break to switch 02 over to mobile- Second attempt pt. amb. 5' forward)  Device 1:  (Second attempt pt. took approx. 5 steps forward using a wh. walker and mod assist- Pt. struggling to advance right le therefore lagging behind - assisted by weight shifting pt. to left however pt. continued to struggle -)  Gait Support Devices:  (Pt. struggled further to step backwards - requiring extra time and assist to move pt.'s feet.)  Transfers  Transfer: Yes  Transfer 1  Transfer From 1: Sit to, Stand to  Transfer to 1: Stand, Sit  Transfer Level of Assistance 1: Moderate assistance    Outcome Measures:  [facility] Basic Mobility  Turning from your back to your side while in a flat bed without using bedrails: A lot  Moving from lying on your back to sitting on the side of a flat bed without using bedrails: A lot  Moving to and from bed to chair (including a wheelchair): A  lot  Standing up from a chair using your arms (e.g. wheelchair or bedside chair): A lot  To walk in hospital room: A lot  Climbing 3-5 steps with railing: Total  Basic Mobility - Total Score: 11    Education Documentation  Body Mechanics, taught by Lisha Hines PTA at 11/27/2023  9:51 AM.  Learner: Patient  Readiness: Acceptance  Method: Explanation, Demonstration  Response: Needs Reinforcement    Home Exercise Program, taught by Lisha Hines PTA at 11/27/2023  9:51 AM.  Learner: Patient  Readiness: Acceptance  Method: Explanation, Demonstration  Response: Needs Reinforcement    Mobility Training, taught by Lisha Hines PTA at 11/27/2023  9:51 AM.  Learner: Patient  Readiness: Acceptance  Method: Explanation, Demonstration  Response: Needs Reinforcement    Education Comments  No comments found.        OP EDUCATION:       Encounter Problems       Encounter Problems (Active)       Mobility       STG - Patient will ambulate 20 ft with LRAD and mod assist (Progressing)       Start:  11/24/23    Expected End:  12/08/23               Transfers       STG - Transfer from bed to chair with LRAD and mod assist (Progressing)       Start:  11/24/23    Expected End:  12/08/23            STG - Patient will perform bed mobility with CGA (Progressing)       Start:  11/24/23    Expected End:  12/08/23

## 2023-11-27 NOTE — CARE PLAN
The patient's goals for the shift include Rest and comfort    The clinical goals for the shift include patient will remain free from injury this shift

## 2023-11-27 NOTE — PROGRESS NOTES
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  The patient presents to to [facility] emergency department on 11/22 with a 10-day history of progressive weakness, confusion, with changes in gait and speech.  The patient was transferred to [facility] for further management.  She has been provided with dexamethasone.  The patient has been seen by neurosurgical services, and radiation oncology has been asked to assist with potential care coordination.     Initial attempts at MRI brain with contrast were unsuccessful.  Repeat studies were obtained today with anesthesia.  Approximately 13 enhancing lesions are seen within the cerebral hemispheres, the largest measuring approximately 2.3 x 2.2 cm involving the left frontal lobe, with associated vasogenic edema and mass effect.        Subjective   Today, patient reports feeling overall better with dexamethasone.  She continues to complain of some weakness in her right lower extremity.  She is additionally supported with a Dobbhoff feeding tube for dysphagia.  She is accompanied by her significant other and daughter.  They are currently requesting to have the the patient transferred back to [facility] for further care.       Objective     MRI brain 11/23/23  FINDINGS:  There are numerous scattered enhancing lesions of various sizes  within cerebral hemispheres bilaterally denoted by arrows on series  10 of post gadolinium axial volumetric T1 weighted MRI images. The  largest heterogeneous enhancing lesion within the left cerebral  hemisphere is identified along the inferior left frontal lobe  measuring approximately 23 mm by 22 mm in transaxial dimensions as  seen on axial post gadolinium volumetric T1 slice 87 of 256. There is  a 2nd dominant heterogeneous enhancing lesion noted along the  parasagittal posterior left  frontal lobe convexity measuring  approximately 19 mm x 15 mm in transaxial dimensions as seen on axial  post gadolinium volumetric T1 slice 34 of 256. The largest of the  enhancing lesions within the right cerebral hemisphere is identified  within the posterior right frontal lobe convexity measuring  approximately 15 mm x 13 mm in transaxial dimensions as seen on axial  post gadolinium volumetric T1 slice 48 of 256. There is significant  surrounding vasogenic edema within the cerebral hemispheres  bilaterally. There is associated mass effect with effacement and  partial effacement of surrounding sulci within the cerebral  hemispheres left greater than right. There is partial effacement of  the lateral ventricles left greater than right. There is no  significant midline shift.      The gradient echo T2 images demonstrate blooming dark signal  associated with several of the enhancing mass lesions within the  cerebral hemispheres bilaterally suggesting associated blood products  and/or dystrophic calcification/mineralization.      The diffusion-weighted images demonstrate nonspecific increased  diffusion signal associated with several of the enhancing lesions  which is nonspecific finding as increased diffusion signal can be  seen with evolving blood products, neoplasm, as well as cytotoxic  edema. Increased diffusion signal can also be seen with underlying  infection although this latter possibility is felt less likely.      The above findings are superimposed upon mild-to-moderate brain  parenchymal volume loss.      There are additional scattered nonspecific white matter changes  within the cerebral hemispheres bilaterally as well as ill-defined  increased signal on the FLAIR and T2 images overlying the brainstem  which while nonspecific, given the patient's age, likely represent  sequelae of small-vessel ischemic change.      There is mild mucosal thickening noted within scattered ethmoid air  cells.      There is  opacification of a few left mastoid air cells.      IMPRESSION:  There are numerous scattered enhancing lesions of various sizes  within cerebral hemispheres bilaterally denoted by arrows on series  10 of post gadolinium axial volumetric T1 weighted MRI images as  described above. The MRI findings are most concerning for metastatic  disease.      Physical Exam  General: awake, alert, resting comfortably, she appears weak and frail, no acute distress  HEENT: pupils equal and round, no scleral icterus  Pulmonary: Breathing comfortably at rest with the support of supplemental oxygen via nasal cannula  Cardiac: regular rate  Abdomen: soft, nondistended, non tender to palpation   EXT: no peripheral edema, no asymmetry noted  MSK: no focal joint swelling noted  Neuro: A&O x 3, cranial nerves II through XII grossly intact, sensation intact, 3-4/5 weakness in the right lower extremity both proximally and distally  Psych: Normal affect     Lab Results   Component Value Date    WBC 11.7 (H) 11/27/2023    HGB 12.4 11/27/2023    HCT 38.4 11/27/2023    MCV 95 11/27/2023     11/27/2023     Lab Results   Component Value Date    GLUCOSE 92 11/27/2023    CALCIUM 8.8 11/27/2023     11/27/2023    K 4.2 11/27/2023    CO2 31 11/27/2023     11/27/2023    BUN 37 (H) 11/27/2023    CREATININE 0.49 (L) 11/27/2023                Assessment/Plan   This is a 78-year-old female with multiple chronic comorbidities including a history of papillary thyroid cancer, left lung adenocarcinoma, and 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  The patient presents to to [facility] emergency department on 11/22 with a 10-day history of progressive weakness, confusion, with changes in gait and speech.     Initial attempts at MRI brain with contrast were unsuccessful.  Repeat studies were obtained today with anesthesia.  Approximately 13 enhancing lesions are seen within the cerebral hemispheres, the largest measuring  approximately 2.3 x 2.2 cm involving the left frontal lobe, with associated vasogenic edema and mass effect.      The patient's symptoms have improved with steroids.  She continues to exhibit some weakness in her right lower extremity.  She is supported with Dobbhoff feeding tube for dysphagia.  Patient and her family are questioning for transfer back to [facility] for further management, as she has not required neurosurgical interventions.      The patient was assessed with my attending physician, Dr. Garcia.    MRI results were discussed with the patient, and her family.  She has been recommended for hippocampal avoidance whole brain radiotherapy, 30Gy in 10 fractions.  This can be considered in follow-up at [facility].  We recommend continuing dexamethasone with GI prophylaxis.           Naveed Bhatt PA-C      Attending Attestation:  I performed a history and physical examination of the patient, reviewed the pertinent imaging, and discussed the management with the Advanced Practice Provider. I reviewed the Advanced Practice Provider's note and I agree with the history, physical exam and medical decision making as documented with the following additions/ exceptions/ observations:     78yoF c hx of thyroid cancer and L lung adeno now with progressive confusion/weakness and 11/24/23 MRI (requiring anesthesia) revealing 13 brain masses; origin currently unclear.  Neurosurgery has recommended nonoperative management. AAO x 3.  R pronator drift.  GONZALEZ x 4 to command.   Given her volume of intracranial disease, we would recommend whole brain radiation therapy (WBRT) over stereotactic radiosurgery should she demonstrate tissue diagnosis of malignancy.  As she does not have disease located close to the hippocampal avoidance zone associated with hippocampal sparing WBRT, she is a candidate for hippocampal-sparing WBRT to 30 Gy in 10 fractions.  Should she not be amenable to hippocampal-sparing WBRT,  we would recommend traditional WBRT.  The benefits and risks of WBRT including hair loss, headaches, and memory loss were explained to the patient. She can undergo hippocampal-sparing WBRT at our [place] facility or back at [facility], which is closest to her residence. Thank you very much for this consult.     Mitch Garcia III, M.D.  Director of Spine Oncology   of Radiation Oncology and Neurological Surgery  [employer]

## 2023-11-27 NOTE — CARE PLAN
Problem: Skin  Goal: Decreased wound size/increased tissue granulation at next dressing change  Outcome: Progressing  Goal: Participates in plan/prevention/treatment measures  Outcome: Progressing  Goal: Prevent/manage excess moisture  Outcome: Progressing  Goal: Prevent/minimize sheer/friction injuries  Outcome: Progressing  Goal: Promote/optimize nutrition  Outcome: Progressing  Goal: Promote skin healing  Outcome: Progressing     Problem: Fall/Injury  Goal: Not fall by end of shift  Outcome: Progressing  Goal: Be free from injury by end of the shift  Outcome: Progressing  Goal: Verbalize understanding of personal risk factors for fall in the hospital  Outcome: Progressing  Goal: Verbalize understanding of risk factor reduction measures to prevent injury from fall in the home  Outcome: Progressing  Goal: Use assistive devices by end of the shift  Outcome: Progressing  Goal: Pace activities to prevent fatigue by end of the shift  Outcome: Progressing     Problem: Diet Management  Goal: Maintain stable weight  Outcome: Progressing   The patient's goals for the shift include Rest and comfort    The clinical goals for the shift include Maintain Safety, Maintain Tube Feedings    Over the shift, the patient did not make progress toward the following goals. Barriers to progression include nutrition. Recommendations to address these barriers include MD cara.

## 2023-11-27 NOTE — PROGRESS NOTES
Speech-Language Pathology                 Therapy Communication Note    Patient Name: Valorie Montelongo  MRN: [id number]  Today's Date: 11/27/2023     Discipline: Speech Language Pathology    Missed Visit Reason:  Pt/family speaking w/ Radiation Oncology PA at this time.  Family still working to determine overall goals of care, but wish to transition pt's care back to [facility].  Pt remains NPO per results of MBSS completed 11/25/23.  Had DHT for nutrition at this time.  Speech Therapy will continue to follow.      11/27/23 at 6:05 PM - Yecenia Christian, SLP

## 2023-11-27 NOTE — PROGRESS NOTES
"Valorie Montelongo is a 78 y.o. female on day 4 of admission presenting with Metastatic adenocarcinoma (CMS/HCC).    Subjective   Patient seen at bedside, A+OX3, States that she is doing well today. Per  and daughter at bedside, patient mental status and strength has significant improved since start of steroids. We discussed PEG tube placement and rad/onc consult and patient and family would like her to be transferred back to [facility]. Denies any fevers/chills, SOB, CP, heart palpitations, headaches dizziness or vision changes        Objective     Physical Exam  HENT:      Head: Normocephalic.      Nose: Nose normal.      Mouth/Throat:      Mouth: Mucous membranes are moist.   Eyes:      Pupils: Pupils are equal, round, and reactive to light.   Cardiovascular:      Rate and Rhythm: Normal rate and regular rhythm.   Pulmonary:      Effort: Pulmonary effort is normal.   Abdominal:      Palpations: Abdomen is soft.   Musculoskeletal:         General: No swelling. Normal range of motion.      Cervical back: Normal range of motion.      Comments: Decreased strength b/l lower ext   Skin:     General: Skin is warm.   Neurological:      Mental Status: She is alert and oriented to person, place, and time.      Comments: Cranial nerves intact, sensation intact   Psychiatric:         Mood and Affect: Mood normal.         Behavior: Behavior normal.         Last Recorded Vitals  Blood pressure 146/70, pulse 57, temperature 36.4 °C (97.5 °F), temperature source Temporal, resp. rate 16, height 1.626 m (5' 4\"), weight 62.6 kg (138 lb), SpO2 95 %.  Intake/Output last 3 Shifts:  I/O last 3 completed shifts:  In: 2127.5 (34 mL/kg) [I.V.:12.5 (0.2 mL/kg); NG/GT:1915; IV Piggyback:200]  Out: 2000 (32 mL/kg) [Urine:2000 (0.9 mL/kg/hr)]  Weight: 62.6 kg     Relevant Results      Malnutrition Diagnosis Status: New  Malnutrition Diagnosis: Moderate malnutrition related to chronic disease or condition  As Evidenced by: moderate muscle " mass loss and moderate subcutanous fat loss  I agree with the dietitian's malnutrition diagnosis.      Assessment/Plan   Principal Problem:    Metastatic adenocarcinoma (CMS/HCC)  Active Problems:    Pulmonary cancer (CMS/HCC)    HTN (hypertension)    Hyperlipidemia    MI (myocardial infarction) (CMS/HCC)    Chronic obstructive pulmonary disease (CMS/HCC)    History of home oxygen therapy    Gastroesophageal reflux disease    Anxiety    Valorie Montelongo is a 77 y/o F w h/o metastatic lung cancer (s/p VATS RAYRAY and LLL resection at  11/18/21 but now follows with oncology at [facility]), h/o papillary thyroid cancer (s/p thyroidectomy 7/2021), oral cancer (s/p resection and XRT), HTN, HLD, NSTEMI (11/25/21), COPD (on 2-3L at baseline), GERD, and anxiety who presented as a transfer from [facility] for neurosurgery evaluation as CT head at OSH showed new metastatic brain lesions. Patient has been experiencing b/l LE weakness, gait changes and speech difficulties x 10 days, and have progressively gotten worse. MRI brain without contrast (11/23) showed multiple brain metastases with surrounding vasogenic edema. MRI whole spine (11/23) without evidence of spinal metastasis or cord compression. NSGY consulted on admit, rec no acute surgical intervention and rec to consult Rad Onc. Started on dexamethasone 4mg q6h + keppra 500mg BID per NSGY, also received MRI brain with and without 11/24. SLP consulted-failed MBSS. 11/25 ENT was consulted for Dobbhoff placement d/t thyroid mass narrowing trachea. Plan to address possible PEG at a later time. Rad Onc consulted 11/24-recs pending. Oncology consulted 11/24-recs pending. Plan to discuss with all the teams Monday 11/27 and come up with prognosis, and plan. Did discuss option of hospice with patient and her family if she decides she doesn't want to pursue any treatment options.      # New brain metastases / bilateral weakness   - hx of metastatic lung cancer and  metastatic papillary thyroid cancer; experiencing progressive b/l LE weakness, speech and gait changes  - OSH 11/22/23 CT head shows multiple masses and vasogenic edema- patient transferred to [facility] for further evaluation   - CT whole spine without contrast (11/23/23): No CT evidence of metastatic disease in the spine, degernative changes and scoliosis noted   - MRI whole spine without contrast (11/23/23): no lesions noted within the cervical, thoracic or lumbar spine, no significant spinal canal or neural foraminal stenosis   - MRI brain without contrast (11/23/23): numerus cerebral lesions scattered throughout the b/l cerebral hemispheres with significant sorrouning vasogenic edema, components of hemorrhage - however exam is somewhat limited due to non-contrast examination   - MRI brain with and without contrast under anesthesia 11/24   - NSGY consulted  on admit - rec no acute surgical intervention and Rad Onc consult, start dex + keppra + MRI brain w/wo   - (11/23- current) continue dexamethasone 4mg q6h + PPI support per neurosurgery recs   - (11/23- current) continue Keppra 500mg BID per neurosurgery recs- currently IV, plan to transition to PO pending SLP eval   - Q4 neuro checks   - PT/OT consulted, rec SNF   - Rad Onc consulted  - Oncology consulted      # Lung adenocarcinoma   - follows with Dr. David Mathias at [facility]   - s/p VATs RAYRAY and LLL resection at  on 11/18/2021   - oncological history limited as community records have limited oncologic information   -Alectinib 1/2022 - 12/2022. It was stopped at that time due to what was thought to be pneumonitis. No further treatment since then  - CT chest with contrast (11/23): increased soft tissue thickening in the left thyoid surgical bed with probable involvement of the left cervical lymph node, dozens of bilateral pulmonary nodules of which a few are new from previous CT (11/2021), new enlargement of aortopulmonary window lymph nose and right interlobar  lymph nodes suspicious for metastatic disease.      # H/o papillary thyroid cancer / oral cancer / acquired hypothyroidism   - s/p s/p thyroidectomy 7/2021  - TSH level 0.04 11/24  - T4 1.38   - Continue with home levothyroxine 137 mcg daily before breakfast  - ENT consulted, Per ENT thyroid mass likely is unresectable, would consider FNA of thyroid mass to assess for targetable mutations that could benefit from additional medical treatment.  Also discussed with patient the recommendation for vocal cord injection which may be performed here on an inpatient basis by one of our laryngologist versus by Dr. Perkins and his team if patient pursues transfer to [facility] for radiation treatment.       # Progressive dysphagia   - SLP consulted on admission I/s/o new brain mets and occasional reported dysphagia  - MBSS 11/25 showing severe impairment  - 11/25 ENT placed Dobbhoff then likely transition to PEG  - 11/25 ENT consulted for Dobbhoff placement d/t thyroid mass invading trachea.   - Dietician consulted for TF recs   - 11/25 started on TF Isosource 1.5 goal 45 mL/hour        - available meds transitioned to IV  - If patient remains at , will place an order for IR PEG placement      # HTN  - continue with home amlodipine 5mg daily, home Irbesartan substituted for losartan 50mg daily while inpatient      # CAD with H /o NSTEMI  - NSTEMI 11/25/2021   - continue with home atorvastatin 10mg nightly and daily ASA     # COPD  - former smoker, baseline oxygen continuous 2-3L at home   - continue with home Ipratropium 21mcg 2 sprays TID PRN shortness of breath   - continue with home albuterol 90mcg q6 hours PRN wheezing      # Chronic back pain   - CT spine and MRI without contrast (11/23/23): without evidence of spinal metastasis or cord compression   - continue home gabapentin 300mg TID, Norco 5-325mg q4 hours PRN moderate pain, flexeril 10mg TID      # Overreactive bladder   - Home Toviaz substituted for  oxybutynin 5mg BID while inpatient      # Anxiety   - continue with home ativan PRN anxiety      # DVT prophy / misc  - hold prophy Lovenox with MRI brain without contrast suspicious components of hemorrhage  - PPI support with dexamethasone      # Dispo   - FULL CODE, confirmed on admission   - Primary oncologist: Dr. Mathias - notified of admission 11/27   - DC pending  Rad Onc recs, PT/OT recs, feeding tube placement and plan   -  José Manuel: [phone] - updated at bedside 11/27     I spent 60 minutes in the professional and overall care of this patient.    CUCO AroraC

## 2023-11-28 LAB
ALBUMIN SERPL BCP-MCNC: 3.2 G/DL (ref 3.4–5)
ALP SERPL-CCNC: 62 U/L (ref 33–136)
ALT SERPL W P-5'-P-CCNC: 17 U/L (ref 7–45)
ANION GAP SERPL CALC-SCNC: 11 MMOL/L (ref 10–20)
AST SERPL W P-5'-P-CCNC: 15 U/L (ref 9–39)
BASOPHILS # BLD AUTO: 0 X10*3/UL (ref 0–0.1)
BASOPHILS NFR BLD AUTO: 0 %
BILIRUB SERPL-MCNC: 0.5 MG/DL (ref 0–1.2)
BUN SERPL-MCNC: 31 MG/DL (ref 6–23)
CALCIUM SERPL-MCNC: 8.7 MG/DL (ref 8.6–10.6)
CHLORIDE SERPL-SCNC: 107 MMOL/L (ref 98–107)
CO2 SERPL-SCNC: 30 MMOL/L (ref 21–32)
CREAT SERPL-MCNC: 0.52 MG/DL (ref 0.5–1.05)
EOSINOPHIL # BLD AUTO: 0 X10*3/UL (ref 0–0.4)
EOSINOPHIL NFR BLD AUTO: 0 %
ERYTHROCYTE [DISTWIDTH] IN BLOOD BY AUTOMATED COUNT: 12.1 % (ref 11.5–14.5)
GFR SERPL CREATININE-BSD FRML MDRD: >90 ML/MIN/1.73M*2
GLUCOSE SERPL-MCNC: 109 MG/DL (ref 74–99)
HCT VFR BLD AUTO: 37.9 % (ref 36–46)
HGB BLD-MCNC: 12.2 G/DL (ref 12–16)
IMM GRANULOCYTES # BLD AUTO: 0.04 X10*3/UL (ref 0–0.5)
IMM GRANULOCYTES NFR BLD AUTO: 0.3 % (ref 0–0.9)
LYMPHOCYTES # BLD AUTO: 0.35 X10*3/UL (ref 0.8–3)
LYMPHOCYTES NFR BLD AUTO: 3 %
MCH RBC QN AUTO: 29.9 PG (ref 26–34)
MCHC RBC AUTO-ENTMCNC: 32.2 G/DL (ref 32–36)
MCV RBC AUTO: 93 FL (ref 80–100)
MONOCYTES # BLD AUTO: 0.62 X10*3/UL (ref 0.05–0.8)
MONOCYTES NFR BLD AUTO: 5.3 %
NEUTROPHILS # BLD AUTO: 10.64 X10*3/UL (ref 1.6–5.5)
NEUTROPHILS NFR BLD AUTO: 91.4 %
NRBC BLD-RTO: 0 /100 WBCS (ref 0–0)
PLATELET # BLD AUTO: 144 X10*3/UL (ref 150–450)
POTASSIUM SERPL-SCNC: 4.5 MMOL/L (ref 3.5–5.3)
PROT SERPL-MCNC: 4.9 G/DL (ref 6.4–8.2)
RBC # BLD AUTO: 4.08 X10*6/UL (ref 4–5.2)
SODIUM SERPL-SCNC: 143 MMOL/L (ref 136–145)
WBC # BLD AUTO: 11.7 X10*3/UL (ref 4.4–11.3)

## 2023-11-28 PROCEDURE — 1170000001 HC PRIVATE ONCOLOGY ROOM DAILY

## 2023-11-28 PROCEDURE — 85025 COMPLETE CBC W/AUTO DIFF WBC: CPT

## 2023-11-28 PROCEDURE — 99233 SBSQ HOSP IP/OBS HIGH 50: CPT | Performed by: PHYSICIAN ASSISTANT

## 2023-11-28 PROCEDURE — 80053 COMPREHEN METABOLIC PANEL: CPT

## 2023-11-28 PROCEDURE — C9113 INJ PANTOPRAZOLE SODIUM, VIA: HCPCS | Performed by: NURSE PRACTITIONER

## 2023-11-28 PROCEDURE — 2500000001 HC RX 250 WO HCPCS SELF ADMINISTERED DRUGS (ALT 637 FOR MEDICARE OP): Performed by: PHYSICIAN ASSISTANT

## 2023-11-28 PROCEDURE — 36415 COLL VENOUS BLD VENIPUNCTURE: CPT

## 2023-11-28 PROCEDURE — 92526 ORAL FUNCTION THERAPY: CPT | Mod: GN

## 2023-11-28 PROCEDURE — 2500000001 HC RX 250 WO HCPCS SELF ADMINISTERED DRUGS (ALT 637 FOR MEDICARE OP)

## 2023-11-28 PROCEDURE — 2500000004 HC RX 250 GENERAL PHARMACY W/ HCPCS (ALT 636 FOR OP/ED)

## 2023-11-28 PROCEDURE — 2500000004 HC RX 250 GENERAL PHARMACY W/ HCPCS (ALT 636 FOR OP/ED): Performed by: NURSE PRACTITIONER

## 2023-11-28 RX ADMIN — LORAZEPAM 0.5 MG: 2 INJECTION INTRAMUSCULAR; INTRAVENOUS at 17:43

## 2023-11-28 RX ADMIN — LEVOTHYROXINE SODIUM 137 MCG: 137 TABLET ORAL at 07:31

## 2023-11-28 RX ADMIN — LEVETIRACETAM 500 MG: 5 INJECTION INTRAVENOUS at 09:28

## 2023-11-28 RX ADMIN — DEXAMETHASONE SODIUM PHOSPHATE 4 MG: 4 INJECTION, SOLUTION INTRAMUSCULAR; INTRAVENOUS at 04:47

## 2023-11-28 RX ADMIN — Medication 50 MCG: at 09:27

## 2023-11-28 RX ADMIN — GABAPENTIN 300 MG: 250 SOLUTION ORAL at 15:04

## 2023-11-28 RX ADMIN — GABAPENTIN 300 MG: 250 SOLUTION ORAL at 22:49

## 2023-11-28 RX ADMIN — OXYBUTYNIN CHLORIDE 5 MG: 5 TABLET ORAL at 09:27

## 2023-11-28 RX ADMIN — DEXAMETHASONE SODIUM PHOSPHATE 4 MG: 4 INJECTION, SOLUTION INTRAMUSCULAR; INTRAVENOUS at 22:49

## 2023-11-28 RX ADMIN — OXYBUTYNIN CHLORIDE 5 MG: 5 TABLET ORAL at 20:34

## 2023-11-28 RX ADMIN — PANTOPRAZOLE SODIUM 40 MG: 40 INJECTION, POWDER, FOR SOLUTION INTRAVENOUS at 07:31

## 2023-11-28 RX ADMIN — LOSARTAN POTASSIUM 50 MG: 50 TABLET, FILM COATED ORAL at 09:27

## 2023-11-28 RX ADMIN — CYCLOBENZAPRINE 10 MG: 10 TABLET, FILM COATED ORAL at 04:47

## 2023-11-28 RX ADMIN — GABAPENTIN 300 MG: 250 SOLUTION ORAL at 07:31

## 2023-11-28 RX ADMIN — ATORVASTATIN CALCIUM 10 MG: 10 TABLET, FILM COATED ORAL at 20:34

## 2023-11-28 RX ADMIN — LEVETIRACETAM 500 MG: 5 INJECTION INTRAVENOUS at 20:38

## 2023-11-28 RX ADMIN — AMLODIPINE BESYLATE 5 MG: 5 TABLET ORAL at 09:27

## 2023-11-28 RX ADMIN — HYDROCODONE BITARTRATE AND ACETAMINOPHEN 2 TABLET: 5; 325 TABLET ORAL at 04:47

## 2023-11-28 RX ADMIN — HYDROCODONE BITARTRATE AND ACETAMINOPHEN 2 TABLET: 5; 325 TABLET ORAL at 23:38

## 2023-11-28 RX ADMIN — ASPIRIN 81 MG CHEWABLE TABLET 81 MG: 81 TABLET CHEWABLE at 09:27

## 2023-11-28 RX ADMIN — DEXAMETHASONE SODIUM PHOSPHATE 4 MG: 4 INJECTION, SOLUTION INTRAMUSCULAR; INTRAVENOUS at 17:43

## 2023-11-28 RX ADMIN — DEXAMETHASONE SODIUM PHOSPHATE 4 MG: 4 INJECTION, SOLUTION INTRAMUSCULAR; INTRAVENOUS at 11:57

## 2023-11-28 ASSESSMENT — COGNITIVE AND FUNCTIONAL STATUS - GENERAL
CLIMB 3 TO 5 STEPS WITH RAILING: TOTAL
HELP NEEDED FOR BATHING: A LITTLE
DRESSING REGULAR UPPER BODY CLOTHING: A LITTLE
WALKING IN HOSPITAL ROOM: A LOT
HELP NEEDED FOR BATHING: A LITTLE
CLIMB 3 TO 5 STEPS WITH RAILING: TOTAL
DRESSING REGULAR LOWER BODY CLOTHING: A LITTLE
PERSONAL GROOMING: A LOT
DAILY ACTIVITIY SCORE: 17
DAILY ACTIVITIY SCORE: 17
TOILETING: A LOT
MOVING TO AND FROM BED TO CHAIR: A LOT
TOILETING: A LOT
WALKING IN HOSPITAL ROOM: A LOT
DRESSING REGULAR LOWER BODY CLOTHING: A LITTLE
MOVING FROM LYING ON BACK TO SITTING ON SIDE OF FLAT BED WITH BEDRAILS: A LITTLE
STANDING UP FROM CHAIR USING ARMS: A LOT
MOVING TO AND FROM BED TO CHAIR: A LOT
STANDING UP FROM CHAIR USING ARMS: A LOT
MOVING FROM LYING ON BACK TO SITTING ON SIDE OF FLAT BED WITH BEDRAILS: A LITTLE
PERSONAL GROOMING: A LOT
DRESSING REGULAR UPPER BODY CLOTHING: A LITTLE
TURNING FROM BACK TO SIDE WHILE IN FLAT BAD: A LOT
TURNING FROM BACK TO SIDE WHILE IN FLAT BAD: A LOT
MOBILITY SCORE: 12
MOBILITY SCORE: 12

## 2023-11-28 ASSESSMENT — PAIN SCALES - GENERAL
PAINLEVEL_OUTOF10: 0 - NO PAIN
PAINLEVEL_OUTOF10: 5 - MODERATE PAIN
PAINLEVEL_OUTOF10: 0 - NO PAIN

## 2023-11-28 NOTE — PROGRESS NOTES
Speech-Language Pathology    Inpatient  Speech-Language Pathology Treatment     Patient Name: Valorie Montelongo  MRN: [id number]  Today's Date: 11/28/2023  Time Calculation  Start Time: 1520  Stop Time: 1537  Time Calculation (min): 17 min         Assessment:  Severe pharyngeal dysphagia per Modified Barium Swallow 11/25/23.  SILENT aspiration with all consistencies assessed.  Poor epiglottal inversion, tongue base retraction, and hyolaryngeal elevation/excursion.      Suspect late effect radiation dysphagia as primary cause of deficits observed.  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       Recommendations:   Solid Diet Recommendations : NPO   Liquid Diet Recommendations: NPO    Continued conversation re: goals of care related to dysphagia.  Decision will need to be made- NPO w/ an alternative means of nutrition vs continued PO intake w/ known aspiration/PNA risk.  At this time, pt and  reporting that they wish to pursue PEG placement.      OK for small amounts of ice chips (one at a time, 5x/hour) for oral comfort and to prevent swallow disuse atrophy, but only after aggressive oral care to avoid colonization of bacteria within the oral cavity.     Frequent, aggressive oral care to improve infection control, as well as to reduce dental plaque and bacteria on oropharyngeal surfaces which may increase the risk nosocomial infections, including pneumonia.       Plan:  SLP Plan: Skilled SLP warranted  SLP Frequency: 2x week  Duration: 1 weeks       Goals:  Pt will indicate understanding of dysphagia and risk for aspiration/PNA via teach back method with > 90% accuracy.  *Traditional dysphagia (e.g. laryngeal and pharyngeal strengthening exercises) therapy will likely not be of benefit given length of time since xRT       Subjective:  Pt properly identified and treated bedside.  Awake and alert, with no c/o pain.  DHT in place.   present.      Objective:  SLP had conversation at length w/ pt and   re: MBSS results, cause of dysphagia, prognosis for recovery, and risk of aspiration.  All questions answered.  Understanding indicated.        11/28/23 at 6:44 PM - Yecenia Christian, SLP

## 2023-11-28 NOTE — CARE PLAN
The patient's goals for the shift include Rest and comfort    The clinical goals for the shift include Patient will remain safe while waiting for transfer to another facility    Over the shift, the patient did not make progress toward the following goals. Barriers to progression include patient and family cognition and understanding of plan

## 2023-11-28 NOTE — CARE PLAN
Problem: Skin  Goal: Decreased wound size/increased tissue granulation at next dressing change  Outcome: Progressing  Goal: Participates in plan/prevention/treatment measures  Outcome: Progressing  Goal: Prevent/manage excess moisture  Outcome: Progressing  Goal: Prevent/minimize sheer/friction injuries  Outcome: Progressing  Goal: Promote/optimize nutrition  Outcome: Progressing  Goal: Promote skin healing  Outcome: Progressing     Problem: Fall/Injury  Goal: Not fall by end of shift  Outcome: Progressing  Goal: Be free from injury by end of the shift  Outcome: Progressing  Goal: Verbalize understanding of personal risk factors for fall in the hospital  Outcome: Progressing  Goal: Verbalize understanding of risk factor reduction measures to prevent injury from fall in the home  Outcome: Progressing  Goal: Use assistive devices by end of the shift  Outcome: Progressing  Goal: Pace activities to prevent fatigue by end of the shift  Outcome: Progressing     Problem: Diet Management  Goal: Maintain stable weight  Outcome: Progressing   The patient's goals for the shift include Rest and comfort    The clinical goals for the shift include patient will remain free from injury this shift    Over the shift, the patient did not make progress toward the following goals. Barriers to progression include nutrition. Recommendations to address these barriers include MD Lucero.

## 2023-11-28 NOTE — PROGRESS NOTES
"Valorie Montelongo is a 78 y.o. female on day 5 of admission presenting with Metastatic adenocarcinoma (CMS/HCC).    Subjective   Patient seen at bedside, A+OX3, States that she is doing well today. Had extensive discussions with the patient and her  at bedside and they will not consent for any procedure to be done here at [facility] and would like to be transferred back to [facility]. We discussed getting a PEG tube and discharge to a SNF to follow up with Dr. Mathias and rad/onc at [facility] however patient wants to be transferred back to [facility] to continue her care.  Denies any fevers/chills, SOB, CP, heart palpitations, headaches dizziness or vision changes. Mental status has continue to improve and        Objective     Physical Exam  HENT:      Head: Normocephalic.      Nose: Nose normal.      Mouth/Throat:      Mouth: Mucous membranes are moist.   Eyes:      Pupils: Pupils are equal, round, and reactive to light.   Cardiovascular:      Rate and Rhythm: Normal rate and regular rhythm.   Pulmonary:      Effort: Pulmonary effort is normal.   Abdominal:      Palpations: Abdomen is soft.   Musculoskeletal:         General: No swelling. Normal range of motion.      Cervical back: Normal range of motion.      Comments: Decreased strength b/l lower ext   Skin:     General: Skin is warm.   Neurological:      Mental Status: She is alert and oriented to person, place, and time.      Comments: Cranial nerves intact, sensation intact   Psychiatric:         Mood and Affect: Mood normal.         Behavior: Behavior normal.         Last Recorded Vitals  Blood pressure 129/68, pulse 63, temperature 36.2 °C (97.2 °F), temperature source Temporal, resp. rate 20, height 1.626 m (5' 4\"), weight 62.6 kg (138 lb), SpO2 96 %.  Intake/Output last 3 Shifts:  I/O last 3 completed shifts:  In: 2075 (33.1 mL/kg) [I.V.:100 (1.6 mL/kg); NG/GT:1975]  Out: 1075 (17.2 mL/kg) [Urine:1075 (0.5 mL/kg/hr)]  Weight: 62.6 kg     Relevant " Results    Malnutrition Diagnosis Status: New  Malnutrition Diagnosis: Moderate malnutrition related to chronic disease or condition  As Evidenced by: moderate muscle mass loss and moderate subcutanous fat loss  I agree with the dietitian's malnutrition diagnosis.    Assessment/Plan   Principal Problem:    Metastatic adenocarcinoma (CMS/HCC)  Active Problems:    Pulmonary cancer (CMS/HCC)    HTN (hypertension)    Hyperlipidemia    MI (myocardial infarction) (CMS/HCC)    Chronic obstructive pulmonary disease (CMS/HCC)    History of home oxygen therapy    Gastroesophageal reflux disease    Anxiety    Valorie Montelongo is a 79 y/o F w h/o metastatic lung cancer (s/p VATS RAYRAY and LLL resection at  11/18/21 but now follows with oncology at [facility]), h/o papillary thyroid cancer (s/p thyroidectomy 7/2021), oral cancer (s/p resection and XRT), HTN, HLD, NSTEMI (11/25/21), COPD (on 2-3L at baseline), GERD, and anxiety who presented as a transfer from [facility] for neurosurgery evaluation as CT head at OSH showed new metastatic brain lesions. Patient has been experiencing b/l LE weakness, gait changes and speech difficulties x 10 days, and have progressively gotten worse. MRI brain without contrast (11/23) showed multiple brain metastases with surrounding vasogenic edema. MRI whole spine (11/23) without evidence of spinal metastasis or cord compression. NSGY consulted on admit, rec no acute surgical intervention and rec to consult Rad Onc. Started on dexamethasone 4mg q6h + keppra 500mg BID per NSGY, also received MRI brain with and without 11/24. SLP consulted-failed MBSS. 11/25 ENT was consulted for Dobbhoff placement d/t thyroid mass narrowing trachea. Plan to address possible PEG at a later time. Rad Onc consulted 11/24-recs appreciated. Oncology consulted 11/24-recs appreciated.   Awaiting transfer back to [facility] for continued care.      # New brain metastases / bilateral weakness   - hx of  metastatic lung cancer and metastatic papillary thyroid cancer; experiencing progressive b/l LE weakness, speech and gait changes  - OSH 11/22/23 CT head shows multiple masses and vasogenic edema- patient transferred to [facility] for further evaluation   - CT whole spine without contrast (11/23/23): No CT evidence of metastatic disease in the spine, degernative changes and scoliosis noted   - MRI whole spine without contrast (11/23/23): no lesions noted within the cervical, thoracic or lumbar spine, no significant spinal canal or neural foraminal stenosis   - MRI brain without contrast (11/23/23): numerus cerebral lesions scattered throughout the b/l cerebral hemispheres with significant sorrouning vasogenic edema, components of hemorrhage - however exam is somewhat limited due to non-contrast examination   - MRI brain with and without contrast under anesthesia 11/24   - NSGY consulted  on admit - rec no acute surgical intervention and Rad Onc consult, start dex + keppra + MRI brain w/wo   - (11/23- current) continue dexamethasone 4mg q6h + PPI support per neurosurgery recs   - (11/23- current) continue Keppra 500mg BID per neurosurgery recs- currently IV, plan to transition to PO pending SLP eval   - Q4 neuro checks   - PT/OT consulted, rec SNF   - Rad Onc consulted, recs appreciated, patient can get her radiation done at [facility]   - Oncology consulted, recs appreciated     # Lung adenocarcinoma   - follows with Dr. David Mathias at [facility]   - s/p VATs RAYRAY and LLL resection at  on 11/18/2021   - oncological history limited as community records have limited oncologic information   -Alectinib 1/2022 - 12/2022. It was stopped at that time due to what was thought to be pneumonitis. No further treatment since then  - CT chest with contrast (11/23): increased soft tissue thickening in the left thyoid surgical bed with probable involvement of the left cervical lymph node, dozens of bilateral pulmonary nodules of which a  few are new from previous CT (11/2021), new enlargement of aortopulmonary window lymph nose and right interlobar lymph nodes suspicious for metastatic disease.   - Dr. David Mathias aware of patient admission     # H/o papillary thyroid cancer / oral cancer / acquired hypothyroidism   - s/p s/p thyroidectomy 7/2021  - TSH level 0.04 11/24  - T4 1.38   - Continue with home levothyroxine 137 mcg daily before breakfast  - ENT consulted, Per ENT thyroid mass likely is unresectable, would consider FNA of thyroid mass to assess for targetable mutations that could benefit from additional medical treatment.  Also discussed with patient the recommendation for vocal cord injection which may be performed here on an inpatient basis by one of our laryngologist versus by Dr. Perkins and his team if patient pursues transfer to [facility] for radiation treatment.       # Progressive dysphagia   - SLP consulted on admission I/s/o new brain mets and occasional reported dysphagia  - MBSS 11/25 showing severe impairment  - 11/25 ENT placed Dobbhoff then likely transition to PEG  - 11/25 ENT consulted for Dobbhoff placement d/t thyroid mass invading trachea.   - Dietician consulted for TF recs   - 11/25 started on TF Isosource 1.5 goal 45 mL/hour        - available meds transitioned to IV     # HTN  - continue with home amlodipine 5mg daily, home Irbesartan substituted for losartan 50mg daily while inpatient      # CAD with H /o NSTEMI  - NSTEMI 11/25/2021   - continue with home atorvastatin 10mg nightly and daily ASA     # COPD  - former smoker, baseline oxygen continuous 2-3L at home   - continue with home Ipratropium 21mcg 2 sprays TID PRN shortness of breath   - continue with home albuterol 90mcg q6 hours PRN wheezing      # Chronic back pain   - CT spine and MRI without contrast (11/23/23): without evidence of spinal metastasis or cord compression   - continue home gabapentin 300mg TID, Norco 5-325mg q4 hours PRN moderate pain,  flexeril 10mg TID      # Overreactive bladder   - Home Toviaz substituted for oxybutynin 5mg BID while inpatient      # Anxiety   - continue with home ativan PRN anxiety      # DVT prophy / misc  - hold prophy Lovenox with MRI brain without contrast suspicious components of hemorrhage  - PPI support with dexamethasone      # Dispo   - FULL CODE, confirmed on admission   - Primary oncologist: Dr. Mathias - notified of admission 11/27   - DC pending transfer back to [facility] for continued care  -  José Manuel: [phone] - updated at bedside 11/27     I spent 60 minutes in the professional and overall care of this patient.    Dayday Hess PA-C

## 2023-11-29 ENCOUNTER — TUMOR BOARD CONFERENCE (OUTPATIENT)
Dept: HEMATOLOGY/ONCOLOGY | Facility: HOSPITAL | Age: 78
End: 2023-11-29
Payer: COMMERCIAL

## 2023-11-29 LAB
ALBUMIN SERPL BCP-MCNC: 2.9 G/DL (ref 3.4–5)
ALP SERPL-CCNC: 63 U/L (ref 33–136)
ALT SERPL W P-5'-P-CCNC: 16 U/L (ref 7–45)
ANION GAP SERPL CALC-SCNC: 11 MMOL/L (ref 10–20)
AST SERPL W P-5'-P-CCNC: 13 U/L (ref 9–39)
BASOPHILS # BLD AUTO: 0 X10*3/UL (ref 0–0.1)
BASOPHILS NFR BLD AUTO: 0 %
BILIRUB SERPL-MCNC: 0.4 MG/DL (ref 0–1.2)
BUN SERPL-MCNC: 33 MG/DL (ref 6–23)
CALCIUM SERPL-MCNC: 8.5 MG/DL (ref 8.6–10.6)
CHLORIDE SERPL-SCNC: 106 MMOL/L (ref 98–107)
CO2 SERPL-SCNC: 30 MMOL/L (ref 21–32)
CREAT SERPL-MCNC: 0.63 MG/DL (ref 0.5–1.05)
EOSINOPHIL # BLD AUTO: 0 X10*3/UL (ref 0–0.4)
EOSINOPHIL NFR BLD AUTO: 0 %
ERYTHROCYTE [DISTWIDTH] IN BLOOD BY AUTOMATED COUNT: 12.1 % (ref 11.5–14.5)
GFR SERPL CREATININE-BSD FRML MDRD: >90 ML/MIN/1.73M*2
GLUCOSE SERPL-MCNC: 156 MG/DL (ref 74–99)
HCT VFR BLD AUTO: 37.2 % (ref 36–46)
HGB BLD-MCNC: 11.9 G/DL (ref 12–16)
IMM GRANULOCYTES # BLD AUTO: 0.06 X10*3/UL (ref 0–0.5)
IMM GRANULOCYTES NFR BLD AUTO: 0.6 % (ref 0–0.9)
LYMPHOCYTES # BLD AUTO: 0.25 X10*3/UL (ref 0.8–3)
LYMPHOCYTES NFR BLD AUTO: 2.4 %
MCH RBC QN AUTO: 30 PG (ref 26–34)
MCHC RBC AUTO-ENTMCNC: 32 G/DL (ref 32–36)
MCV RBC AUTO: 94 FL (ref 80–100)
MONOCYTES # BLD AUTO: 0.63 X10*3/UL (ref 0.05–0.8)
MONOCYTES NFR BLD AUTO: 6 %
NEUTROPHILS # BLD AUTO: 9.49 X10*3/UL (ref 1.6–5.5)
NEUTROPHILS NFR BLD AUTO: 91 %
NRBC BLD-RTO: 0 /100 WBCS (ref 0–0)
PLATELET # BLD AUTO: 147 X10*3/UL (ref 150–450)
POTASSIUM SERPL-SCNC: 4.7 MMOL/L (ref 3.5–5.3)
PROT SERPL-MCNC: 4.9 G/DL (ref 6.4–8.2)
RBC # BLD AUTO: 3.97 X10*6/UL (ref 4–5.2)
SODIUM SERPL-SCNC: 142 MMOL/L (ref 136–145)
WBC # BLD AUTO: 10.4 X10*3/UL (ref 4.4–11.3)

## 2023-11-29 PROCEDURE — 36415 COLL VENOUS BLD VENIPUNCTURE: CPT

## 2023-11-29 PROCEDURE — 2500000001 HC RX 250 WO HCPCS SELF ADMINISTERED DRUGS (ALT 637 FOR MEDICARE OP)

## 2023-11-29 PROCEDURE — 2500000001 HC RX 250 WO HCPCS SELF ADMINISTERED DRUGS (ALT 637 FOR MEDICARE OP): Performed by: PHYSICIAN ASSISTANT

## 2023-11-29 PROCEDURE — 97110 THERAPEUTIC EXERCISES: CPT | Mod: GP,CQ

## 2023-11-29 PROCEDURE — 2500000004 HC RX 250 GENERAL PHARMACY W/ HCPCS (ALT 636 FOR OP/ED): Performed by: NURSE PRACTITIONER

## 2023-11-29 PROCEDURE — 99233 SBSQ HOSP IP/OBS HIGH 50: CPT | Performed by: PHYSICIAN ASSISTANT

## 2023-11-29 PROCEDURE — 97116 GAIT TRAINING THERAPY: CPT | Mod: GP,CQ

## 2023-11-29 PROCEDURE — 85025 COMPLETE CBC W/AUTO DIFF WBC: CPT

## 2023-11-29 PROCEDURE — 2500000004 HC RX 250 GENERAL PHARMACY W/ HCPCS (ALT 636 FOR OP/ED)

## 2023-11-29 PROCEDURE — 80053 COMPREHEN METABOLIC PANEL: CPT

## 2023-11-29 PROCEDURE — 97530 THERAPEUTIC ACTIVITIES: CPT | Mod: GP,CQ

## 2023-11-29 PROCEDURE — C9113 INJ PANTOPRAZOLE SODIUM, VIA: HCPCS | Performed by: NURSE PRACTITIONER

## 2023-11-29 PROCEDURE — 1170000001 HC PRIVATE ONCOLOGY ROOM DAILY

## 2023-11-29 RX ADMIN — DEXAMETHASONE SODIUM PHOSPHATE 4 MG: 4 INJECTION, SOLUTION INTRAMUSCULAR; INTRAVENOUS at 06:21

## 2023-11-29 RX ADMIN — HYDROCODONE BITARTRATE AND ACETAMINOPHEN 2 TABLET: 5; 325 TABLET ORAL at 22:43

## 2023-11-29 RX ADMIN — GABAPENTIN 300 MG: 250 SOLUTION ORAL at 20:06

## 2023-11-29 RX ADMIN — DEXAMETHASONE SODIUM PHOSPHATE 4 MG: 4 INJECTION, SOLUTION INTRAMUSCULAR; INTRAVENOUS at 12:20

## 2023-11-29 RX ADMIN — LOSARTAN POTASSIUM 50 MG: 50 TABLET, FILM COATED ORAL at 10:09

## 2023-11-29 RX ADMIN — GABAPENTIN 300 MG: 250 SOLUTION ORAL at 06:22

## 2023-11-29 RX ADMIN — CYCLOBENZAPRINE 10 MG: 10 TABLET, FILM COATED ORAL at 00:57

## 2023-11-29 RX ADMIN — OXYBUTYNIN CHLORIDE 5 MG: 5 TABLET ORAL at 20:06

## 2023-11-29 RX ADMIN — DEXAMETHASONE SODIUM PHOSPHATE 4 MG: 4 INJECTION, SOLUTION INTRAMUSCULAR; INTRAVENOUS at 22:43

## 2023-11-29 RX ADMIN — LEVETIRACETAM 500 MG: 5 INJECTION INTRAVENOUS at 10:09

## 2023-11-29 RX ADMIN — Medication 50 MCG: at 10:09

## 2023-11-29 RX ADMIN — OXYBUTYNIN CHLORIDE 5 MG: 5 TABLET ORAL at 10:09

## 2023-11-29 RX ADMIN — ATORVASTATIN CALCIUM 10 MG: 10 TABLET, FILM COATED ORAL at 20:06

## 2023-11-29 RX ADMIN — DEXAMETHASONE SODIUM PHOSPHATE 4 MG: 4 INJECTION, SOLUTION INTRAMUSCULAR; INTRAVENOUS at 18:22

## 2023-11-29 RX ADMIN — ASPIRIN 81 MG CHEWABLE TABLET 81 MG: 81 TABLET CHEWABLE at 10:09

## 2023-11-29 RX ADMIN — LEVOTHYROXINE SODIUM 137 MCG: 137 TABLET ORAL at 10:09

## 2023-11-29 RX ADMIN — AMLODIPINE BESYLATE 5 MG: 5 TABLET ORAL at 10:09

## 2023-11-29 RX ADMIN — PANTOPRAZOLE SODIUM 40 MG: 40 INJECTION, POWDER, FOR SOLUTION INTRAVENOUS at 06:21

## 2023-11-29 RX ADMIN — GABAPENTIN 300 MG: 250 SOLUTION ORAL at 15:33

## 2023-11-29 RX ADMIN — LEVETIRACETAM 500 MG: 5 INJECTION INTRAVENOUS at 20:05

## 2023-11-29 ASSESSMENT — COGNITIVE AND FUNCTIONAL STATUS - GENERAL
WALKING IN HOSPITAL ROOM: A LOT
MOVING FROM LYING ON BACK TO SITTING ON SIDE OF FLAT BED WITH BEDRAILS: A LOT
TURNING FROM BACK TO SIDE WHILE IN FLAT BAD: A LOT
STANDING UP FROM CHAIR USING ARMS: A LOT
MOVING TO AND FROM BED TO CHAIR: A LOT
MOBILITY SCORE: 11
CLIMB 3 TO 5 STEPS WITH RAILING: TOTAL

## 2023-11-29 ASSESSMENT — PAIN - FUNCTIONAL ASSESSMENT
PAIN_FUNCTIONAL_ASSESSMENT: 0-10
PAIN_FUNCTIONAL_ASSESSMENT: 0-10

## 2023-11-29 ASSESSMENT — PAIN SCALES - GENERAL
PAINLEVEL_OUTOF10: 0 - NO PAIN
PAINLEVEL_OUTOF10: 7

## 2023-11-29 NOTE — PROGRESS NOTES
"Valorie Montelonog is a 78 y.o. female on day 6 of admission presenting with Metastatic adenocarcinoma (CMS/HCC).    Subjective   Patient seen at bedside, A+OX3, States that she is doing well today. Had extensive discussions with the patient and her  that she is not appropriate to go back to [facility] and that we will place a PEG for feeding and discharge her home to follow up with her oncologist and rad/onc at [facility]. Patient and her  agreed to the plan. Patient declined SNF placement.  Denies any fevers/chills, SOB, CP, heart palpitations, headaches dizziness or vision changes. Mental status has continue to improve and is at baseline.        Objective     Physical Exam  HENT:      Head: Normocephalic.      Nose: Nose normal.      Mouth/Throat:      Mouth: Mucous membranes are moist.   Eyes:      Pupils: Pupils are equal, round, and reactive to light.   Cardiovascular:      Rate and Rhythm: Normal rate and regular rhythm.   Pulmonary:      Effort: Pulmonary effort is normal.   Abdominal:      Palpations: Abdomen is soft.   Musculoskeletal:         General: No swelling. Normal range of motion.      Cervical back: Normal range of motion.      Comments: Decreased strength b/l lower ext   Skin:     General: Skin is warm.   Neurological:      Mental Status: She is alert and oriented to person, place, and time.      Comments: Cranial nerves intact, sensation intact   Psychiatric:         Mood and Affect: Mood normal.         Behavior: Behavior normal.         Last Recorded Vitals  Blood pressure 110/59, pulse 66, temperature 36.4 °C (97.5 °F), temperature source Temporal, resp. rate 18, height 1.626 m (5' 4\"), weight 62.6 kg (138 lb), SpO2 97 %.  Intake/Output last 3 Shifts:  I/O last 3 completed shifts:  In: 1740 (27.8 mL/kg) [I.V.:100 (1.6 mL/kg); NG/GT:1640]  Out: 1250 (20 mL/kg) [Urine:1250 (0.6 mL/kg/hr)]  Weight: 62.6 kg     Relevant Results    Malnutrition Diagnosis Status: New  Malnutrition " Diagnosis: Moderate malnutrition related to chronic disease or condition  As Evidenced by: moderate muscle mass loss and moderate subcutanous fat loss  I agree with the dietitian's malnutrition diagnosis.    Assessment/Plan   Principal Problem:    Metastatic adenocarcinoma (CMS/HCC)  Active Problems:    Pulmonary cancer (CMS/HCC)    HTN (hypertension)    Hyperlipidemia    MI (myocardial infarction) (CMS/HCC)    Chronic obstructive pulmonary disease (CMS/HCC)    History of home oxygen therapy    Gastroesophageal reflux disease    Anxiety    Valorie Montelongo is a 77 y/o F w h/o metastatic lung cancer (s/p VATS RAYRAY and LLL resection at  11/18/21 but now follows with oncology at [facility]), h/o papillary thyroid cancer (s/p thyroidectomy 7/2021), oral cancer (s/p resection and XRT), HTN, HLD, NSTEMI (11/25/21), COPD (on 2-3L at baseline), GERD, and anxiety who presented as a transfer from [facility] for neurosurgery evaluation as CT head at OSH showed new metastatic brain lesions. Patient has been experiencing b/l LE weakness, gait changes and speech difficulties x 10 days, and have progressively gotten worse. MRI brain without contrast (11/23) showed multiple brain metastases with surrounding vasogenic edema. MRI whole spine (11/23) without evidence of spinal metastasis or cord compression. NSGY consulted on admit, rec no acute surgical intervention and rec to consult Rad Onc. Started on dexamethasone 4mg q6h + keppra 500mg BID per NSGY, also received MRI brain with and without 11/24. SLP consulted-failed MBSS. 11/25 ENT was consulted for Dobbhoff placement d/t thyroid mass narrowing trachea. Plan to Place PEG by IR tomorrow. Rad Onc consulted 11/24-recs appreciated. Oncology consulted 11/24-recs appreciated.     # New brain metastases / bilateral weakness   - hx of metastatic lung cancer and metastatic papillary thyroid cancer; experiencing progressive b/l LE weakness, speech and gait changes  - OSH  11/22/23 CT head shows multiple masses and vasogenic edema- patient transferred to [facility] for further evaluation   - CT whole spine without contrast (11/23/23): No CT evidence of metastatic disease in the spine, degernative changes and scoliosis noted   - MRI whole spine without contrast (11/23/23): no lesions noted within the cervical, thoracic or lumbar spine, no significant spinal canal or neural foraminal stenosis   - MRI brain without contrast (11/23/23): numerus cerebral lesions scattered throughout the b/l cerebral hemispheres with significant sorrouning vasogenic edema, components of hemorrhage - however exam is somewhat limited due to non-contrast examination   - MRI brain with and without contrast under anesthesia 11/24   - NSGY consulted  on admit - rec no acute surgical intervention and Rad Onc consult, start dex + keppra + MRI brain w/wo   - (11/23- current) continue dexamethasone 4mg q6h + PPI support per neurosurgery recs   - (11/23- current) continue Keppra 500mg BID per neurosurgery recs- currently IV, plan to transition to PO pending SLP eval   - Q4 neuro checks   - PT/OT consulted, rec SNF   - Rad Onc consulted, recs appreciated, patient can get her radiation done at [facility] on discharge   - Oncology consulted, recs appreciated, will follow up with Dr. Mathias at [facility] on discharge      # Lung adenocarcinoma   - follows with Dr. David Mathias at [facility]   - s/p VATs RAYRAY and LLL resection at  on 11/18/2021   - oncological history limited as community records have limited oncologic information   -Alectinib 1/2022 - 12/2022. It was stopped at that time due to what was thought to be pneumonitis. No further treatment since then  - CT chest with contrast (11/23): increased soft tissue thickening in the left thyoid surgical bed with probable involvement of the left cervical lymph node, dozens of bilateral pulmonary nodules of which a few are new from previous CT (11/2021), new enlargement of  aortopulmonary window lymph nose and right interlobar lymph nodes suspicious for metastatic disease.   - Dr. David Mathias aware of patient admission     # H/o papillary thyroid cancer / oral cancer / acquired hypothyroidism   - s/p s/p thyroidectomy 7/2021  - TSH level 0.04 11/24  - T4 1.38   - Continue with home levothyroxine 137 mcg daily before breakfast  - ENT consulted, Per ENT thyroid mass likely is unresectable, would consider FNA of thyroid mass to assess for targetable mutations that could benefit from additional medical treatment.  Also discussed with patient the recommendation for vocal cord injection which may be performed here on an inpatient basis by one of our laryngologist versus by Dr. Perkins and his team if patient pursues transfer to [facility] for radiation treatment.       # Progressive dysphagia   - SLP consulted on admission I/s/o new brain mets and occasional reported dysphagia  - MBSS 11/25 showing severe impairment  - 11/25 ENT placed Dobbhoff then likely transition to PEG  - 11/25 ENT consulted for Dobbhoff placement d/t thyroid mass invading trachea.   - Dietician consulted for TF recs   - 11/25 started on TF Isosource 1.5 goal 45 mL/hour        - Plan IR PEG placement tomorrow      # HTN  - continue with home amlodipine 5mg daily, home Irbesartan substituted for losartan 50mg daily while inpatient      # CAD with H /o NSTEMI  - NSTEMI 11/25/2021   - continue with home atorvastatin 10mg nightly and daily ASA     # COPD  - former smoker, baseline oxygen continuous 2-3L at home   - continue with home Ipratropium 21mcg 2 sprays TID PRN shortness of breath   - continue with home albuterol 90mcg q6 hours PRN wheezing      # Chronic back pain   - CT spine and MRI without contrast (11/23/23): without evidence of spinal metastasis or cord compression   - continue home gabapentin 300mg TID, Norco 5-325mg q4 hours PRN moderate pain, flexeril 10mg TID      # Overreactive bladder   - Home  Toviaz substituted for oxybutynin 5mg BID while inpatient   - Harvey in place for urinary retention, will do TOV tomorrow      # Anxiety   - continue with home ativan PRN anxiety      # DVT prophy / misc  - hold prophy Lovenox with MRI brain without contrast suspicious components of hemorrhage  - PPI support with dexamethasone      # Dispo   - FULL CODE, confirmed on admission   - Primary oncologist: Dr. Mathias - notified of admission 11/27   -  José Manuel: [phone] - updated at bedside 11/28  - Discharge home after PEG placement to follow up with Dr. Mathias and rad/onc at [facility]      I spent 60 minutes in the professional and overall care of this patient.    CUCO AroraC

## 2023-11-29 NOTE — CARE PLAN
The patient's goals for the shift include Rest and comfort    The clinical goals for the shift include pt will not fall throughout shift    Over the shift, the patient did not make progress toward the following goals. Barriers to progression include medical devices. Recommendations to address these barriers include call light in reach.

## 2023-11-29 NOTE — PROGRESS NOTES
Physical Therapy    Physical Therapy Treatment    Patient Name: Valorie Montelongo  MRN: [id number]  Today's Date: 11/29/2023  Time Calculation  Start Time: 1019  Stop Time: 1108  Time Calculation (min): 49 min       Assessment/Plan      PT Plan  Inpatient/Swing Bed or Outpatient: Inpatient  PT Plan  PT Plan: Skilled PT  PT Frequency: 3 times per week  PT Discharge Recommendations: Moderate intensity level of continued care  PT - OK to Discharge: Yes      General Visit Information:   PT  Visit  PT Received On: 11/29/23  General  Past Medical History Relevant to Rehab: 78 y.o. female with h/o HTN HLD NSTEMI 2/2 pneumothorax (11/25/21), lung adenocarinoma s/p L VATS, RAYRAY and LLL wedge resection (11/18/21), COPD (on 2L), oral cancer s/p resection and rads, GERD, papillary thyroid cancer s/p thyroidectomy 7/2021, hypothyroidism OAB, chronic pain syndrome, anxiety  Family/Caregiver Present: Yes  Caregiver Feedback:  in room - very supportive  Prior to Session Communication: Bedside nurse  General Comment: Pt. has dobhoff however not receiving nutrition at this time. Pt. complaining of IV port hurting- RN addressed by removing at this time due to infiltrate. (Pt. willing to participate- appears distracted by own thought at times during education.)    Subjective   Precautions:  Precautions  Medical Precautions: Seizure precautions, Fall precautions  Vital Signs:  Vital Signs  SpO2: 97 % (room air)    Objective   Pain:  Pain Assessment  Pain Assessment: 0-10  Pain Score: 0 - No pain  Cognition:  Cognition  Arousal/Alertness:  (Appears lethargic but particiapted thru out.)  Attention: Exceptions to WFL  Divided Attention: Impaired  Postural Control:  Postural Control  Trunk Control: Leans left in sitting- does not self correct and has difficulty righting self with cues.  Extremity/Trunk Assessments:                    Activity Tolerance:     Treatments:  Therapeutic Exercise  Therapeutic Exercise Performed: Yes  Therapeutic  Exercise Activity 1: Supine bilat le ex AP'S, SAQ'S, HS, AND ABD X 15 REPS.    Therapeutic Activity  Therapeutic Activity Performed: Yes  Therapeutic Activity 1: Pt worked on sitting balance - pt. tends to lean left - cues to correct - Pt. requires min assist to correct and then reverts back to leaning - appearing not to be aware. ( and Pt. both giving reasons why she is leaning- scoliosis/possible CVA- Encouraged to work on sitting midline to progress.)    Bed Mobility  Bed Mobility: Yes  Bed Mobility 1  Bed Mobility 1: Side lying left to sit, Rolling left  Level of Assistance 1: Minimum assistance, Minimal verbal cues, Minimal tactile cues  Bed Mobility Comments 1: verbal and manual assist required. .  Bed Mobility 2  Bed Mobility  2: Sitting to supine  Level of Assistance 2:  (Mod assist of 2)    Ambulation/Gait Training  Ambulation/Gait Training Performed: Yes  Ambulation/Gait Training 1  Surface 1:  (Initially worked on standing upright as pt. tends to be flexed forward along with hips/knees remaining flexed. Able to position pt. however pt. reverts.)  Device 1:  (Pt. amb. 8' using a wh. walker and mod assist. Pt. having difficulty maintaining upright posture along with difficulty advancing right le - Pt. also having difficulty following instruction on sequence. Increased time spent on cueing for posture)  Gait Support Devices:  (Due to patient having difficulty remaining upright  and poor step height and length -encouraged pt. to step back to chair- Pt. having difficulty stepping back to chair.)  Assistance 1:  (Due to difficulty advancing right le - block session on lateral shifting to unweight right le - Pt. able to shift with hands flat on tray table but also flexing forward during - repeated cues on posture.)  Transfers  Transfer: Yes  Transfer 1  Transfer From 1: Sit to, Stand to  Transfer to 1: Stand, Sit  Transfer Level of Assistance 1: Moderate assistance  Trials/Comments 1: Pt. having  difficulty following instruction on hand placement and technique. Pt. required min/mod assist for boost and to steady.    Outcome Measures:  [facility] Basic Mobility  Turning from your back to your side while in a flat bed without using bedrails: A lot  Moving from lying on your back to sitting on the side of a flat bed without using bedrails: A lot  Moving to and from bed to chair (including a wheelchair): A lot  Standing up from a chair using your arms (e.g. wheelchair or bedside chair): A lot  To walk in hospital room: A lot  Climbing 3-5 steps with railing: Total  Basic Mobility - Total Score: 11    Education Documentation  Precautions, taught by Lisha Hines PTA at 11/29/2023 11:40 AM.  Learner: Patient  Readiness: Acceptance  Method: Explanation, Demonstration  Response: Needs Reinforcement    Body Mechanics, taught by Lisha Hines PTA at 11/29/2023 11:40 AM.  Learner: Patient  Readiness: Acceptance  Method: Explanation, Demonstration  Response: Needs Reinforcement    Home Exercise Program, taught by Lisha Hines PTA at 11/29/2023 11:40 AM.  Learner: Patient  Readiness: Acceptance  Method: Explanation, Demonstration  Response: Needs Reinforcement    Mobility Training, taught by Lisha Hines PTA at 11/29/2023 11:40 AM.  Learner: Patient  Readiness: Acceptance  Method: Explanation, Demonstration  Response: Needs Reinforcement    Education Comments  No comments found.        OP EDUCATION:       Encounter Problems       Encounter Problems (Active)       Mobility       STG - Patient will ambulate 20 ft with LRAD and mod assist (Progressing)       Start:  11/24/23    Expected End:  12/08/23               Transfers       STG - Transfer from bed to chair with LRAD and mod assist (Progressing)       Start:  11/24/23    Expected End:  12/08/23            STG - Patient will perform bed mobility with CGA (Progressing)       Start:  11/24/23    Expected End:  12/08/23

## 2023-11-29 NOTE — CONSULTS
[facility]   [facility] [place]  INITIAL CONSULT NOTE       Reason For Consult  PEG    SUBJECTIVE     History Of Present Illness  Valorie Montelongo is a 78 y.o. female with a h/o metastatic lung cancer (s/p VATS RAYRAY and LLL resection at  11/18/21 but now follows with oncology at [facility]), h/o papillary thyroid cancer (s/p thyroidectomy 7/2021), oral cancer (s/p resection and XRT), HTN, HLD, NSTEMI (11/25/21), COPD (on 2-3L at baseline), GERD, and anxiety who presented as a transfer from [facility] for neurosurgery evaluation after CT head at OSH showed new metastatic brain lesions. On 11/23/2023. GI is consulted for PEG placement    Patient had been experiencing b/l LE weakness, gait changes and speech difficulties x 10 days, and have progressively gotten worse. MRI brain without contrast (11/23) showed multiple brain metastases with surrounding vasogenic edema. MRI whole spine (11/23) without evidence of spinal metastasis or cord compression. NSGY consulted on admit, rec no acute surgical intervention and rec to consult Rad Onc. Started on dexamethasone 4mg q6h + keppra 500mg BID per NSGY. No neurosurgical intervention. She has new/recurrent thyroid mass on imaging. Has been seen by ENT and deemed unresectable.  Radiation oncology was consulted and is planning four outpatient radiation on discharge. Planning to have her discuss treatment options with her oncologist at [facility]. Her discharge is pending PEG tube.    Patient reports that for a few days prior to admission, she felt like there was a mass or swelling in the back of her throat that was causing her to have trouble swallowing. Prior to this she was swallowing without issue. No coughing or choking with swallowing. She says the steroids she has been started on have helped tremendously and the sensation of swallowing is 80% better. She does not feels like she has a problem swallowing anymore, it is  just the teams here that are telling her she has issues. She recalls having a PEG in 2021 prior to one of her surgeries. It was removed 3 months later and she had no issues.    SLP MBSS 11/25 showed silent aspiration with all consistencies assessed. ENT was consulted for Dobbhoff placement under videoscopic guidance. Has been tolerating tube feeds.    SLP assessment as of 11/28/2023  Severe pharyngeal dysphagia per Modified Barium Swallow 11/25/23.  SILENT aspiration with all consistencies assessed.  Poor epiglottal inversion, tongue base retraction, and hyolaryngeal elevation/excursion.       Suspect late effect radiation dysphagia as primary cause of deficits observed.  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     Review of Systems  Denies abdominal pain, N/V, fevers.       Past Medical History:  See above    Home Medications  Medications Prior to Admission   Medication Sig Dispense Refill Last Dose    amLODIPine (Norvasc) 5 mg tablet Take 1 tablet (5 mg) by mouth once daily.   not taking    aspirin 81 mg chewable tablet Chew 1 tablet (81 mg) once daily.   not taking    atorvastatin (Lipitor) 10 mg tablet Take 1 tablet (10 mg) by mouth once daily.       docusate sodium (Colace) 100 mg capsule Take 1 capsule (100 mg) by mouth if needed for constipation.       fesoterodine (Toviaz) 4 mg tablet extended release 24 hr Take 1 tablet (4 mg) by mouth once daily.       HYDROcodone-acetaminophen (Norco)  mg tablet Take 1 tablet by mouth every 4 hours if needed for moderate pain (4 - 6).       ipratropium (Atrovent) 21 mcg (0.03 %) nasal spray Administer 2 sprays into each nostril 3 times a day as needed for rhinitis.       irbesartan (Avapro) 150 mg tablet Take 1 tablet (150 mg) by mouth once daily.       levothyroxine (Synthroid, Levoxyl) 137 mcg tablet Take 1 tablet (137 mcg) by mouth once daily in the morning. Take before meals.       LORazepam (Ativan) 1 mg tablet Take 1 tablet (1 mg) by mouth once daily  as needed for anxiety.       pantoprazole (Protonix) 40 mg EC tablet Take 1 tablet (40 mg) by mouth every 12 hours if needed (acid reflux).       albuterol 90 mcg/actuation inhaler Inhale 2 puffs every 6 hours if needed for wheezing or shortness of breath.       cholecalciferol (Vitamin D-3) 25 MCG (1000 UT) tablet Take 2 tablets (2,000 Units) by mouth once daily.       ondansetron (Zofran) 8 mg tablet Take 1 tablet (8 mg) by mouth every 8 hours if needed for nausea or vomiting.       vit A/vit C/vit E/zinc/copper (ICAPS AREDS ORAL) Take 1 capsule by mouth once daily.            Surgical History:  No prior history of gastric bypass  Past Surgical History:   Procedure Laterality Date    CT HEAD ANGIO W AND WO IV CONTRAST  4/12/2019    CT HEAD ANGIO W AND WO IV CONTRAST 4/12/2019 [facility]    OTHER SURGICAL HISTORY  07/21/2021    Hysterectomy    OTHER SURGICAL HISTORY  11/02/2021    Cholecystectomy    OTHER SURGICAL HISTORY  11/02/2021    Cataract surgery    OTHER SURGICAL HISTORY  12/03/2021    Lung surgery    OTHER SURGICAL HISTORY  12/09/2021    Pleural biopsy    OTHER SURGICAL HISTORY  12/09/2021    Lung wedge resection    OTHER SURGICAL HISTORY  09/28/2021    Thyroidectomy total    OTHER SURGICAL HISTORY  09/29/2021    Percutaneous endoscopic gastrostomy tube removal    OTHER SURGICAL HISTORY  09/29/2021    Percutaneous endoscopic gastrostomy tube insertion       Allergies:    Allergies   Allergen Reactions    Fluoxetine Insomnia    Levofloxacin Itching and Rash       Social History:  Tobacco: still smokes 3 cigarettes daily  Denies drugs, alcohol  Social History     Socioeconomic History    Marital status: Single     Spouse name: Not on file    Number of children: Not on file    Years of education: Not on file    Highest education level: Not on file   Occupational History    Not on file   Tobacco Use    Smoking status: Not on file    Smokeless tobacco: Not on file   Substance and Sexual Activity     Alcohol use: Not on file    Drug use: Not on file    Sexual activity: Not on file   Other Topics Concern    Not on file   Social History Narrative    Not on file     Social Determinants of Health     Financial Resource Strain: Not on file   Food Insecurity: Not on file   Transportation Needs: Not on file   Physical Activity: Not on file   Stress: Not on file   Social Connections: Not on file   Intimate Partner Violence: Not on file   Housing Stability: Not on file       Family History:  No family history on file.    EXAM     Vitals:    Vitals:    11/29/23 0842 11/29/23 1019 11/29/23 1306 11/29/23 1716   BP: 110/59  140/88 158/81   BP Location: Right arm  Right arm Right arm   Patient Position: Lying  Sitting Lying   Pulse: 66  74 74   Resp: 18  18 18   Temp: 36.4 °C (97.5 °F)  36.8 °C (98.2 °F) 36.6 °C (97.9 °F)   TempSrc: Temporal  Temporal Temporal   SpO2: 94% 97% 96% 97%   Weight:       Height:             Intake/Output Summary (Last 24 hours) at 11/29/2023 1822  Last data filed at 11/29/2023 1716  Gross per 24 hour   Intake 450 ml   Output 1050 ml   Net -600 ml         Physical Exam  General: thin, muscle wasting  HEENT: PERRLA, EOM intact, no scleral icterus, moist MM,   Respiratory: CTA bilaterally, normal work of breathing  Cardiovascular: RRR, no murmurs/rubs/gallops  Abdomen: Soft, nontender, nondistended, bowel sounds present. No masses palpated. Prior PEG scar noted  Extremities: no edema, no asterixis  Neuro: alert and oriented, CNII-XII grossly intact, moves all 4 extremities grossly    OBJECTIVE                                                                              Medications       Current Facility-Administered Medications:     albuterol 2.5 mg /3 mL (0.083 %) nebulizer solution 2.5 mg, 2.5 mg, nebulization, q6h PRN, Triny Mao PA-C    alteplase (Cathflo Activase) injection 2 mg, 2 mg, intra-catheter, PRN, Triny Mao PA-C    amLODIPine (Norvasc) tablet 5 mg, 5 mg, nasogastric tube,  Daily, Dayday Hess PA-C, 5 mg at 11/29/23 1009    aspirin chewable tablet 81 mg, 81 mg, oral, Daily, Triny Mao PA-C, 81 mg at 11/29/23 1009    atorvastatin (Lipitor) tablet 10 mg, 10 mg, oral, Nightly, Triny Mao PA-C, 10 mg at 11/28/23 2034    cholecalciferol (Vitamin D-3) tablet 50 mcg, 50 mcg, oral, Daily, Triny Mao PA-C, 50 mcg at 11/29/23 1009    cyclobenzaprine (Flexeril) tablet 10 mg, 10 mg, oral, TID PRN, Triny Mao PA-C, 10 mg at 11/29/23 0057    dexAMETHasone (Decadron) injection 4 mg, 4 mg, intravenous, q6h, Triny Mao PA-C, 4 mg at 11/29/23 1822    docusate sodium (Colace) capsule 100 mg, 100 mg, oral, PRN, Triny Mao PA-C    gabapentin (Neurontin) solution 300 mg, 300 mg, oral, q8h GRETCHEN, Aure Whiting, APRN-CNP, 300 mg at 11/29/23 1533    HYDROcodone-acetaminophen (Norco) 5-325 mg per tablet 2 tablet, 2 tablet, oral, q4h PRN, Triny Mao PA-C, 2 tablet at 11/28/23 2338    ipratropium (Atrovent) 21 mcg (0.03 %) nasal spray 2 spray, 2 spray, Each Nostril, TID PRN, Triny Mao PA-C    levETIRAcetam in NaCl (iso-os) (Keppra)  mg, 500 mg, intravenous, q12h, Triny Mao PA-C, Stopped at 11/29/23 1024    levothyroxine (Synthroid, Levoxyl) tablet 137 mcg, 137 mcg, nasogastric tube, Daily before breakfast, Dayday Hess PA-C, 137 mcg at 11/29/23 1009    LORazepam (Ativan) tablet 1 mg, 1 mg, oral, Daily PRN, Triny Mao PA-C    losartan (Cozaar) tablet 50 mg, 50 mg, nasogastric tube, Daily, Dayday Hess PA-C, 50 mg at 11/29/23 1009    oxybutynin (Ditropan) tablet 5 mg, 5 mg, oral, BID, Triny Mao PA-C, 5 mg at 11/29/23 1009    oxygen (O2) therapy, , inhalation, Continuous PRN - O2/gases, Triny Mao PA-C    [DISCONTINUED] pantoprazole (ProtoNix) EC tablet 40 mg, 40 mg, oral, Daily before breakfast **OR** pantoprazole (ProtoNix) injection 40 mg, 40 mg, intravenous, Daily before breakfast, Georgina Copeland,  APRN-CNP, 40 mg at 11/29/23 0621                                                                            Labs     Results for orders placed or performed during the hospital encounter of 11/23/23 (from the past 24 hour(s))   CBC and Auto Differential   Result Value Ref Range    WBC 10.4 4.4 - 11.3 x10*3/uL    nRBC 0.0 0.0 - 0.0 /100 WBCs    RBC 3.97 (L) 4.00 - 5.20 x10*6/uL    Hemoglobin 11.9 (L) 12.0 - 16.0 g/dL    Hematocrit 37.2 36.0 - 46.0 %    MCV 94 80 - 100 fL    MCH 30.0 26.0 - 34.0 pg    MCHC 32.0 32.0 - 36.0 g/dL    RDW 12.1 11.5 - 14.5 %    Platelets 147 (L) 150 - 450 x10*3/uL    Neutrophils % 91.0 40.0 - 80.0 %    Immature Granulocytes %, Automated 0.6 0.0 - 0.9 %    Lymphocytes % 2.4 13.0 - 44.0 %    Monocytes % 6.0 2.0 - 10.0 %    Eosinophils % 0.0 0.0 - 6.0 %    Basophils % 0.0 0.0 - 2.0 %    Neutrophils Absolute 9.49 (H) 1.60 - 5.50 x10*3/uL    Immature Granulocytes Absolute, Automated 0.06 0.00 - 0.50 x10*3/uL    Lymphocytes Absolute 0.25 (L) 0.80 - 3.00 x10*3/uL    Monocytes Absolute 0.63 0.05 - 0.80 x10*3/uL    Eosinophils Absolute 0.00 0.00 - 0.40 x10*3/uL    Basophils Absolute 0.00 0.00 - 0.10 x10*3/uL   Comprehensive Metabolic Panel   Result Value Ref Range    Glucose 156 (H) 74 - 99 mg/dL    Sodium 142 136 - 145 mmol/L    Potassium 4.7 3.5 - 5.3 mmol/L    Chloride 106 98 - 107 mmol/L    Bicarbonate 30 21 - 32 mmol/L    Anion Gap 11 10 - 20 mmol/L    Urea Nitrogen 33 (H) 6 - 23 mg/dL    Creatinine 0.63 0.50 - 1.05 mg/dL    eGFR >90 >60 mL/min/1.73m*2    Calcium 8.5 (L) 8.6 - 10.6 mg/dL    Albumin 2.9 (L) 3.4 - 5.0 g/dL    Alkaline Phosphatase 63 33 - 136 U/L    Total Protein 4.9 (L) 6.4 - 8.2 g/dL    AST 13 9 - 39 U/L    Bilirubin, Total 0.4 0.0 - 1.2 mg/dL    ALT 16 7 - 45 U/L                                                                              Imaging             ENT 11/25/2023 Nasopharyngolaryngoscopy  PROCEDURE NOTE:  Nasopharyngolaryngoscopy     For better visualization because  of the patients need for a feeding tube and infiltrative thyroid cancer, a flexible fiberoptic nasopharyngolaryngoscopy was performed. Patient was correctly identified and verbal consent obtained.  After topical anesthesia with atomized 4% Lidocaine with Afrin, the flexible scope was introduced into the left naris.     The following areas were visualized:  Nasal septum, turbinates, nasopharynx, oropharynx, hypopharynx, soft palate, base of tongue, epiglottis, and larynx.     These structures were found to be normal with the following notable findings:     -DHT placed under direct visualization  -L TVC immobile  -Significant pooling of secretions with silent aspiration     Procedure Note: Dobhoff Tube (DHT) placement  Verbal informed consent was obtained from the patient/patient's guardian. 4% lidocaine mixed with phenylephrine was prepared and dripped into the nose. It was placed in the right and left nares. Following an appropriate amount of time to allow for adequate anesthesia, a coretrak-compatible DHT was placed into the patient's left naris. The DHT was advanced into the nasopharynx and, after flexion of the patients neck, the DHT tube was passed into the esophagus. Advancement of the tube continued to 65 cm. The tube was Bridled to secure the DHT. The patient tolerated the procedure well.      11/23/2023 CT Chest abdomen pelvis  IMPRESSION:  1. Interval increased soft tissue thickening in the left thyroid  surgical bed (i.e., a 2.8 cm x 2.5 cm x 1.5 cm enhancing lesion with  transmural tracheal wall invasion causing mild narrowing of the  tracheal lumen), probable involvement of the cervical esophageal  wall, and adjacent prominent left cervical lymph node. Findings are  consistent with locoregional progression of known thyroid cancer.  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  2. Dozens of bilateral pulmonary nodules, a few which are  new from  11/16/2021 (annotated on series 205); however, the majority were  previously present (11/16/2021, 08/03/2021) and have increased in  size (annotated on series 205). Differentiation between metastatic  lung adenocarcinoma and metastatic thyroid carcinoma cannot be made  on anatomic imaging alone, and both malignancies demonstrates similar  intense hypermetabolic activity on previous PET CT, in which it was  suggested the thyroid cancer was likely de-differentiated, making  iodine scan less sensitive for detection. Therefore, a biopsy of both  a pre-existing but larger pulmonary nodule and a new pulmonary nodule  could be considered to evaluate for dual-source pulmonary metastases.  3. New enlargement of an aortopulmonary window lymph node and right  interlobar lymph nodes suspicious for metastatic disease.  4. Probable low-grade adenocarcinoma variant in the right lung apex  (0.7 cm), such as adenocarcinoma in situ or atypical adenomatous  hyperplasia, stable in size from prior study.  5. Mucous debris in the trachea, left main stem bronchus and left  upper and lower lobe bronchi.  6. Similar moderately atrophic left kidney with extensive scarring  and interval enlargement of 1.5 cm staghorn calculus in the left  renal pelvis.                                                                             GI Procedures     6/2021 EGD with PEG placement  -normal esophagus, stomach, duodenum  -Peg advanced over guidewire and secured at 2.5cm  -No complications       ASSESSMENT / PLAN                  ASSESSMENT/PLAN:  Valorie Montelongo is a 78 y.o. female with a h/o metastatic lung cancer (s/p VATS RAYRAY and LLL resection at  11/18/21 but now follows with oncology at [facility]), h/o papillary thyroid cancer (s/p thyroidectomy 7/2021), oral cancer (s/p resection and XRT), HTN, HLD, NSTEMI (11/25/21), COPD (on 2-3L at baseline), GERD, and anxiety who presented as a transfer from [facility] for neurosurgery  evaluation after CT head at OSH showed new metastatic brain lesions. On 11/23/2023. GI is consulted for PEG placement    Imaging reviewed. Noted that CT chest/AP shows possible cervical esophageal involvement of the soft tissue thickening in the thyroidectomy bed. Has some mild tracheal narrowing as well. I reviewed MBSS images and the esophagus looks largely patent, but given CT evidence of esophagus involvement, will plan for EGD evaluation first, then will place PEG if able to     We discussed the risk of metastatic implants with PEG placement if there is endoscopic evidence of  esophageal infiltration of her tumor. I explained this means that tumor cells could be pulled down with the feeding tube and could cause new tumors to spread in the areas that the tube travels. She is willing to accept this risk in favor of PEG placement    Recommendations:  -Hold TF at [facility] tonight  -Plan for EGD +/- PEG  -INR ordered  -1g IV cefazolin 30 mins prior to procedure    Patient was discussed with Dr. Mixon. Will be formally seen by attending in AM    Thank you for this interesting consult. Gastroenterology will continue to follow.  -During weekday hours of 7am-5pm please do not hesitate to contact me on [employer] Chat or page [phone] if there are any further questions between the weekday hours of 7 AM - 5 PM.   -After hours, on weekends, and on holidays, please page the on-call GI fellow at [phone]. Thank you.    Cande Galarza MD  PGY4 Gastroenterology Fellow  [facility]

## 2023-11-30 ENCOUNTER — HOSPITAL ENCOUNTER (OUTPATIENT)
Dept: RADIOLOGY | Facility: EXTERNAL LOCATION | Age: 78
Discharge: HOME | End: 2023-11-30

## 2023-11-30 ENCOUNTER — ANESTHESIA EVENT (OUTPATIENT)
Dept: GASTROENTEROLOGY | Facility: HOSPITAL | Age: 78
DRG: 054 | End: 2023-11-30
Payer: COMMERCIAL

## 2023-11-30 ENCOUNTER — APPOINTMENT (OUTPATIENT)
Dept: GASTROENTEROLOGY | Facility: HOSPITAL | Age: 78
DRG: 054 | End: 2023-11-30
Payer: COMMERCIAL

## 2023-11-30 ENCOUNTER — ANESTHESIA (OUTPATIENT)
Dept: GASTROENTEROLOGY | Facility: HOSPITAL | Age: 78
DRG: 054 | End: 2023-11-30
Payer: COMMERCIAL

## 2023-11-30 DIAGNOSIS — C79.49 SECONDARY MALIGNANT NEOPLASM OF BRAIN AND SPINAL CORD (MULTI): ICD-10-CM

## 2023-11-30 DIAGNOSIS — C79.31 SECONDARY MALIGNANT NEOPLASM OF BRAIN AND SPINAL CORD (MULTI): ICD-10-CM

## 2023-11-30 LAB
ALBUMIN SERPL BCP-MCNC: 3.1 G/DL (ref 3.4–5)
ALP SERPL-CCNC: 62 U/L (ref 33–136)
ALT SERPL W P-5'-P-CCNC: 22 U/L (ref 7–45)
ANION GAP SERPL CALC-SCNC: 13 MMOL/L (ref 10–20)
APTT PPP: 27 SECONDS (ref 27–38)
AST SERPL W P-5'-P-CCNC: 17 U/L (ref 9–39)
BASOPHILS # BLD AUTO: 0.01 X10*3/UL (ref 0–0.1)
BASOPHILS NFR BLD AUTO: 0.1 %
BILIRUB SERPL-MCNC: 0.6 MG/DL (ref 0–1.2)
BUN SERPL-MCNC: 29 MG/DL (ref 6–23)
CALCIUM SERPL-MCNC: 8.6 MG/DL (ref 8.6–10.6)
CHLORIDE SERPL-SCNC: 103 MMOL/L (ref 98–107)
CO2 SERPL-SCNC: 28 MMOL/L (ref 21–32)
CREAT SERPL-MCNC: 0.56 MG/DL (ref 0.5–1.05)
EOSINOPHIL # BLD AUTO: 0 X10*3/UL (ref 0–0.4)
EOSINOPHIL NFR BLD AUTO: 0 %
ERYTHROCYTE [DISTWIDTH] IN BLOOD BY AUTOMATED COUNT: 12.3 % (ref 11.5–14.5)
GFR SERPL CREATININE-BSD FRML MDRD: >90 ML/MIN/1.73M*2
GLUCOSE BLD MANUAL STRIP-MCNC: 115 MG/DL (ref 74–99)
GLUCOSE SERPL-MCNC: 114 MG/DL (ref 74–99)
HCT VFR BLD AUTO: 38.8 % (ref 36–46)
HGB BLD-MCNC: 12.5 G/DL (ref 12–16)
IMM GRANULOCYTES # BLD AUTO: 0.06 X10*3/UL (ref 0–0.5)
IMM GRANULOCYTES NFR BLD AUTO: 0.6 % (ref 0–0.9)
INR PPP: 1 (ref 0.9–1.1)
LYMPHOCYTES # BLD AUTO: 0.37 X10*3/UL (ref 0.8–3)
LYMPHOCYTES NFR BLD AUTO: 3.6 %
MCH RBC QN AUTO: 30.3 PG (ref 26–34)
MCHC RBC AUTO-ENTMCNC: 32.2 G/DL (ref 32–36)
MCV RBC AUTO: 94 FL (ref 80–100)
MONOCYTES # BLD AUTO: 0.58 X10*3/UL (ref 0.05–0.8)
MONOCYTES NFR BLD AUTO: 5.7 %
NEUTROPHILS # BLD AUTO: 9.2 X10*3/UL (ref 1.6–5.5)
NEUTROPHILS NFR BLD AUTO: 90 %
NRBC BLD-RTO: 0 /100 WBCS (ref 0–0)
PLATELET # BLD AUTO: 153 X10*3/UL (ref 150–450)
POTASSIUM SERPL-SCNC: 4.5 MMOL/L (ref 3.5–5.3)
PROT SERPL-MCNC: 5.2 G/DL (ref 6.4–8.2)
PROTHROMBIN TIME: 10.7 SECONDS (ref 9.8–12.8)
RBC # BLD AUTO: 4.13 X10*6/UL (ref 4–5.2)
SODIUM SERPL-SCNC: 139 MMOL/L (ref 136–145)
WBC # BLD AUTO: 10.2 X10*3/UL (ref 4.4–11.3)

## 2023-11-30 PROCEDURE — 2500000005 HC RX 250 GENERAL PHARMACY W/O HCPCS

## 2023-11-30 PROCEDURE — 3700000001 HC GENERAL ANESTHESIA TIME - INITIAL BASE CHARGE

## 2023-11-30 PROCEDURE — 36415 COLL VENOUS BLD VENIPUNCTURE: CPT | Performed by: PHYSICIAN ASSISTANT

## 2023-11-30 PROCEDURE — 2500000001 HC RX 250 WO HCPCS SELF ADMINISTERED DRUGS (ALT 637 FOR MEDICARE OP)

## 2023-11-30 PROCEDURE — 7100000009 HC PHASE TWO TIME - INITIAL BASE CHARGE

## 2023-11-30 PROCEDURE — 2500000004 HC RX 250 GENERAL PHARMACY W/ HCPCS (ALT 636 FOR OP/ED)

## 2023-11-30 PROCEDURE — 2780000003 HC OR 278 NO HCPCS

## 2023-11-30 PROCEDURE — 2500000001 HC RX 250 WO HCPCS SELF ADMINISTERED DRUGS (ALT 637 FOR MEDICARE OP): Performed by: PHYSICIAN ASSISTANT

## 2023-11-30 PROCEDURE — 85025 COMPLETE CBC W/AUTO DIFF WBC: CPT

## 2023-11-30 PROCEDURE — C9113 INJ PANTOPRAZOLE SODIUM, VIA: HCPCS | Performed by: NURSE PRACTITIONER

## 2023-11-30 PROCEDURE — 3700000002 HC GENERAL ANESTHESIA TIME - EACH INCREMENTAL 1 MINUTE

## 2023-11-30 PROCEDURE — 85610 PROTHROMBIN TIME: CPT | Performed by: PHYSICIAN ASSISTANT

## 2023-11-30 PROCEDURE — 99100 ANES PT EXTEME AGE<1 YR&>70: CPT | Performed by: STUDENT IN AN ORGANIZED HEALTH CARE EDUCATION/TRAINING PROGRAM

## 2023-11-30 PROCEDURE — 82947 ASSAY GLUCOSE BLOOD QUANT: CPT

## 2023-11-30 PROCEDURE — 43246 EGD PLACE GASTROSTOMY TUBE: CPT | Performed by: INTERNAL MEDICINE

## 2023-11-30 PROCEDURE — 0DH63UZ INSERTION OF FEEDING DEVICE INTO STOMACH, PERCUTANEOUS APPROACH: ICD-10-PCS | Performed by: INTERNAL MEDICINE

## 2023-11-30 PROCEDURE — 2500000004 HC RX 250 GENERAL PHARMACY W/ HCPCS (ALT 636 FOR OP/ED): Performed by: NURSE PRACTITIONER

## 2023-11-30 PROCEDURE — A43246 PR EDG PERCUTANEOUS PLACEMENT GASTROSTOMY TUBE

## 2023-11-30 PROCEDURE — 1170000001 HC PRIVATE ONCOLOGY ROOM DAILY

## 2023-11-30 PROCEDURE — 99222 1ST HOSP IP/OBS MODERATE 55: CPT | Performed by: STUDENT IN AN ORGANIZED HEALTH CARE EDUCATION/TRAINING PROGRAM

## 2023-11-30 PROCEDURE — 7100000010 HC PHASE TWO TIME - EACH INCREMENTAL 1 MINUTE

## 2023-11-30 PROCEDURE — 80053 COMPREHEN METABOLIC PANEL: CPT

## 2023-11-30 PROCEDURE — 99233 SBSQ HOSP IP/OBS HIGH 50: CPT | Performed by: PHYSICIAN ASSISTANT

## 2023-11-30 PROCEDURE — A43246 PR EDG PERCUTANEOUS PLACEMENT GASTROSTOMY TUBE: Performed by: STUDENT IN AN ORGANIZED HEALTH CARE EDUCATION/TRAINING PROGRAM

## 2023-11-30 PROCEDURE — 36415 COLL VENOUS BLD VENIPUNCTURE: CPT

## 2023-11-30 PROCEDURE — 99232 SBSQ HOSP IP/OBS MODERATE 35: CPT | Performed by: PHYSICIAN ASSISTANT

## 2023-11-30 RX ORDER — ONDANSETRON HYDROCHLORIDE 2 MG/ML
4 INJECTION, SOLUTION INTRAVENOUS ONCE AS NEEDED
OUTPATIENT
Start: 2023-11-30

## 2023-11-30 RX ORDER — CEFAZOLIN 1 G/1
INJECTION, POWDER, FOR SOLUTION INTRAVENOUS AS NEEDED
Status: DISCONTINUED | OUTPATIENT
Start: 2023-11-30 | End: 2023-11-30

## 2023-11-30 RX ORDER — LIDOCAINE HYDROCHLORIDE 20 MG/ML
INJECTION, SOLUTION EPIDURAL; INFILTRATION; INTRACAUDAL; PERINEURAL AS NEEDED
Status: DISCONTINUED | OUTPATIENT
Start: 2023-11-30 | End: 2023-11-30

## 2023-11-30 RX ORDER — PROPOFOL 10 MG/ML
INJECTION, EMULSION INTRAVENOUS CONTINUOUS PRN
Status: DISCONTINUED | OUTPATIENT
Start: 2023-11-30 | End: 2023-11-30

## 2023-11-30 RX ORDER — PROPOFOL 10 MG/ML
INJECTION, EMULSION INTRAVENOUS AS NEEDED
Status: DISCONTINUED | OUTPATIENT
Start: 2023-11-30 | End: 2023-11-30

## 2023-11-30 RX ORDER — CEFAZOLIN SODIUM 1 G/50ML
1 SOLUTION INTRAVENOUS ONCE
Status: DISCONTINUED | OUTPATIENT
Start: 2023-11-30 | End: 2023-12-02 | Stop reason: HOSPADM

## 2023-11-30 RX ORDER — FENTANYL CITRATE 50 UG/ML
INJECTION, SOLUTION INTRAMUSCULAR; INTRAVENOUS AS NEEDED
Status: DISCONTINUED | OUTPATIENT
Start: 2023-11-30 | End: 2023-11-30

## 2023-11-30 RX ORDER — MIDAZOLAM HYDROCHLORIDE 1 MG/ML
INJECTION, SOLUTION INTRAMUSCULAR; INTRAVENOUS AS NEEDED
Status: DISCONTINUED | OUTPATIENT
Start: 2023-11-30 | End: 2023-11-30

## 2023-11-30 RX ORDER — SODIUM CHLORIDE, SODIUM LACTATE, POTASSIUM CHLORIDE, CALCIUM CHLORIDE 600; 310; 30; 20 MG/100ML; MG/100ML; MG/100ML; MG/100ML
100 INJECTION, SOLUTION INTRAVENOUS CONTINUOUS
OUTPATIENT
Start: 2023-11-30

## 2023-11-30 RX ORDER — HYDROMORPHONE HYDROCHLORIDE 1 MG/ML
1 INJECTION, SOLUTION INTRAMUSCULAR; INTRAVENOUS; SUBCUTANEOUS ONCE
Status: COMPLETED | OUTPATIENT
Start: 2023-11-30 | End: 2023-11-30

## 2023-11-30 RX ADMIN — CEFAZOLIN 1 G: 1 INJECTION, POWDER, FOR SOLUTION INTRAMUSCULAR; INTRAVENOUS at 12:05

## 2023-11-30 RX ADMIN — DEXAMETHASONE SODIUM PHOSPHATE 4 MG: 4 INJECTION, SOLUTION INTRAMUSCULAR; INTRAVENOUS at 23:47

## 2023-11-30 RX ADMIN — OXYBUTYNIN CHLORIDE 5 MG: 5 TABLET ORAL at 09:59

## 2023-11-30 RX ADMIN — DEXAMETHASONE SODIUM PHOSPHATE 4 MG: 4 INJECTION, SOLUTION INTRAMUSCULAR; INTRAVENOUS at 16:31

## 2023-11-30 RX ADMIN — GABAPENTIN 300 MG: 250 SOLUTION ORAL at 05:36

## 2023-11-30 RX ADMIN — ATORVASTATIN CALCIUM 10 MG: 10 TABLET, FILM COATED ORAL at 21:14

## 2023-11-30 RX ADMIN — MIDAZOLAM 1 MG: 1 INJECTION INTRAMUSCULAR; INTRAVENOUS at 12:33

## 2023-11-30 RX ADMIN — SODIUM CHLORIDE, SODIUM LACTATE, POTASSIUM CHLORIDE, AND CALCIUM CHLORIDE: 600; 310; 30; 20 INJECTION, SOLUTION INTRAVENOUS at 12:29

## 2023-11-30 RX ADMIN — LIDOCAINE HYDROCHLORIDE 100 MG: 20 INJECTION, SOLUTION EPIDURAL; INFILTRATION; INTRACAUDAL; PERINEURAL at 12:35

## 2023-11-30 RX ADMIN — GABAPENTIN 300 MG: 250 SOLUTION ORAL at 21:15

## 2023-11-30 RX ADMIN — DEXAMETHASONE SODIUM PHOSPHATE 4 MG: 4 INJECTION, SOLUTION INTRAMUSCULAR; INTRAVENOUS at 05:36

## 2023-11-30 RX ADMIN — LEVOTHYROXINE SODIUM 137 MCG: 137 TABLET ORAL at 08:04

## 2023-11-30 RX ADMIN — ASPIRIN 81 MG CHEWABLE TABLET 81 MG: 81 TABLET CHEWABLE at 09:59

## 2023-11-30 RX ADMIN — PROPOFOL 50 MG: 10 INJECTION, EMULSION INTRAVENOUS at 12:37

## 2023-11-30 RX ADMIN — PANTOPRAZOLE SODIUM 40 MG: 40 INJECTION, POWDER, FOR SOLUTION INTRAVENOUS at 05:36

## 2023-11-30 RX ADMIN — GABAPENTIN 300 MG: 250 SOLUTION ORAL at 16:31

## 2023-11-30 RX ADMIN — FENTANYL CITRATE 25 MCG: 50 INJECTION, SOLUTION INTRAMUSCULAR; INTRAVENOUS at 12:47

## 2023-11-30 RX ADMIN — OXYBUTYNIN CHLORIDE 5 MG: 5 TABLET ORAL at 21:14

## 2023-11-30 RX ADMIN — Medication 50 MCG: at 09:59

## 2023-11-30 RX ADMIN — LEVETIRACETAM 500 MG: 5 INJECTION INTRAVENOUS at 21:14

## 2023-11-30 RX ADMIN — LOSARTAN POTASSIUM 50 MG: 50 TABLET, FILM COATED ORAL at 09:58

## 2023-11-30 RX ADMIN — HYDROMORPHONE HYDROCHLORIDE 1 MG: 1 INJECTION, SOLUTION INTRAMUSCULAR; INTRAVENOUS; SUBCUTANEOUS at 17:08

## 2023-11-30 RX ADMIN — PROPOFOL 200 MCG/KG/MIN: 10 INJECTION, EMULSION INTRAVENOUS at 12:37

## 2023-11-30 RX ADMIN — HYDROCODONE BITARTRATE AND ACETAMINOPHEN 2 TABLET: 5; 325 TABLET ORAL at 21:14

## 2023-11-30 RX ADMIN — AMLODIPINE BESYLATE 5 MG: 5 TABLET ORAL at 10:00

## 2023-11-30 SDOH — HEALTH STABILITY: MENTAL HEALTH: CURRENT SMOKER: 0

## 2023-11-30 ASSESSMENT — PAIN SCALES - GENERAL
PAINLEVEL_OUTOF10: 8
PAIN_LEVEL: 0
PAINLEVEL_OUTOF10: 0 - NO PAIN

## 2023-11-30 ASSESSMENT — COGNITIVE AND FUNCTIONAL STATUS - GENERAL
DRESSING REGULAR LOWER BODY CLOTHING: A LOT
TOILETING: A LOT
TURNING FROM BACK TO SIDE WHILE IN FLAT BAD: A LOT
WALKING IN HOSPITAL ROOM: A LOT
EATING MEALS: A LOT
DRESSING REGULAR UPPER BODY CLOTHING: A LOT
MOVING TO AND FROM BED TO CHAIR: A LOT
DAILY ACTIVITIY SCORE: 12
HELP NEEDED FOR BATHING: A LOT
EATING MEALS: A LOT
MOVING FROM LYING ON BACK TO SITTING ON SIDE OF FLAT BED WITH BEDRAILS: A LOT
TOILETING: A LOT
TURNING FROM BACK TO SIDE WHILE IN FLAT BAD: A LOT
WALKING IN HOSPITAL ROOM: A LOT
WALKING IN HOSPITAL ROOM: A LOT
CLIMB 3 TO 5 STEPS WITH RAILING: TOTAL
MOVING TO AND FROM BED TO CHAIR: A LOT
STANDING UP FROM CHAIR USING ARMS: A LOT
TURNING FROM BACK TO SIDE WHILE IN FLAT BAD: A LOT
MOVING FROM LYING ON BACK TO SITTING ON SIDE OF FLAT BED WITH BEDRAILS: A LOT
PERSONAL GROOMING: A LOT
MOBILITY SCORE: 11
TOILETING: A LOT
CLIMB 3 TO 5 STEPS WITH RAILING: TOTAL
MOBILITY SCORE: 11
HELP NEEDED FOR BATHING: A LOT
PERSONAL GROOMING: A LOT
CLIMB 3 TO 5 STEPS WITH RAILING: TOTAL
DRESSING REGULAR LOWER BODY CLOTHING: A LOT
HELP NEEDED FOR BATHING: A LOT
EATING MEALS: A LOT
DRESSING REGULAR UPPER BODY CLOTHING: A LOT
MOVING TO AND FROM BED TO CHAIR: A LOT
DRESSING REGULAR UPPER BODY CLOTHING: A LOT
MOBILITY SCORE: 11
DRESSING REGULAR LOWER BODY CLOTHING: A LOT
STANDING UP FROM CHAIR USING ARMS: A LOT
MOVING FROM LYING ON BACK TO SITTING ON SIDE OF FLAT BED WITH BEDRAILS: A LOT
STANDING UP FROM CHAIR USING ARMS: A LOT
DAILY ACTIVITIY SCORE: 12
PERSONAL GROOMING: A LOT
DAILY ACTIVITIY SCORE: 12

## 2023-11-30 ASSESSMENT — PAIN SCALES - WONG BAKER: WONGBAKER_NUMERICALRESPONSE: HURTS WHOLE LOT

## 2023-11-30 ASSESSMENT — PAIN DESCRIPTION - LOCATION: LOCATION: ABDOMEN

## 2023-11-30 ASSESSMENT — PAIN - FUNCTIONAL ASSESSMENT
PAIN_FUNCTIONAL_ASSESSMENT: 0-10

## 2023-11-30 NOTE — PROGRESS NOTES
Valorie Montelongo is a 78 y.o. female on day 7 of admission presenting with Metastatic adenocarcinoma (CMS/HCC).      Subjective   S/P PEG tube placement, continues to complain of some right lower extremity weakness, however is able to ambulate with the assistance of a walker and physical therapy services, anticipating discharge to home in the coming days       Objective     Last Recorded Vitals  /71   Pulse 62   Temp 36.6 °C (97.9 °F)   Resp 17   Wt 62.6 kg (138 lb)   SpO2 95%         Physical Exam  General: awake, alert, resting comfortably, she appears weak and frail, no acute distress  HEENT: pupils equal and round, no scleral icterus  Pulmonary: Breathing comfortably at rest with the support of supplemental oxygen via nasal cannula  Cardiac: regular rate  Abdomen: soft, nondistended, non tender to palpation   EXT: no peripheral edema, no asymmetry noted  MSK: no focal joint swelling noted  Neuro: A&O x 3, cranial nerves II through XII grossly intact, sensation intact, 3-4/5 weakness in the right lower extremity both proximally and distally  Psych: Normal affect       Relevant Results  Lab Results   Component Value Date    WBC 10.2 11/30/2023    HGB 12.5 11/30/2023    HCT 38.8 11/30/2023    MCV 94 11/30/2023     11/30/2023     Lab Results   Component Value Date    GLUCOSE 114 (H) 11/30/2023    CALCIUM 8.6 11/30/2023     11/30/2023    K 4.5 11/30/2023    CO2 28 11/30/2023     11/30/2023    BUN 29 (H) 11/30/2023    CREATININE 0.56 11/30/2023                Assessment/Plan   This is a 78-year-old female with multiple chronic comorbidities including a history of papillary thyroid cancer, left lung adenocarcinoma, and 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  The patient presents to to [facility] emergency department on 11/22 with a 10-day history of progressive weakness, confusion, with changes in gait and speech.  The patient was transferred to [facility] for further  management.      MRI chinyere shows approximately 13 enhancing lesions are seen within the cerebral hemispheres, the largest measuring approximately 2.3 x 2.2 cm involving the left frontal lobe, with associated vasogenic edema and mass effect.  There is no significant midline shift.     Patient's symptoms have largely improved with the initiation of dexamethasone.  She continues to describe some weakness in her right lower extremity.  She additionally has required PEG tube placement for dysphagia.  There are tentative plans for discharge to home with family in the coming days.        PLAN:  Patient was presented at CNS tumor board on 11/29/2023, and recommended for hippocampal avoidance whole brain radiotherapy, 30 Gy in 10 fractions.    In an effort to expedite treatment, she has been offered planning CT simulation tomorrow at [facility], with the hope of beginning treatment at [facility] with Dr. Turner within the next 7-10 days.   Plans were discussed with the patient and her significant other.  All questions have been answered to their satisfaction.  The patient has provided informed consent to proceed.    -Interval HA-WBRT, 30Gy in 10 fractions at [facility]  -CT SIM at [facility] 12/1 @ 8am  -Continue Dex 4mg BID w/ GI Ppx at discharge, taper with/following radiotherapy       I spent ___35__ minutes with this patient. Greater than 50% of this time was spent in the counseling and/or coordination of care of this patient.    Naveed Bhatt PA-C

## 2023-11-30 NOTE — PROGRESS NOTES
Brief GI Note:    Impression  Mild extrinsic compression was observed in the cricopharynx  Blood present in the body of the stomach  Mild erythematous mucosa in the stomach  The duodenal bulb, 1st part of the duodenum and 2nd part of the duodenum appeared normal.  PEG tube placed in the body of the stomach        Findings  Mild, traversable extrinsic compression was observed in the cricopharynx. No evidence of intraluminal tumor invasion. The previously placed Dobhoff feeding tube was in the esophagus and terminating in the stomach.  Regular Z-line 37 cm from the incisors  Blood present in the body of the stomach. Scant amount of hematin in stomach, likely related to Dobhoff feeding tube trauma.  Mild, generalized erythematous mucosa in the stomach  The duodenal bulb, 1st part of the duodenum and 2nd part of the duodenum appeared normal.  A PEG tube was successfully placed in the body of the stomach using a deformable internal bolster via the push technique after the site was identified via transillumination, visualized indentation and needle passed through abdominal wall; scope reinserted and tube rotated freely to confirm placement. Bumper placed at 4.5cm. This is an externally removable PEG.      Recommendations:  Please follow the post-PEG recommendations including:         PEG tube placed successfully with bumper at 4.5 cm    - Nutrition consult for formula, titration and goal rate, if not already done.  - Do not use tube for 3 hours after placement.  - Okay to use the tube for everything (water, medications and tube feeds) 3 hours after returning to the floor.   - Change dressing once per day, dry dressing only, and change dressing on top of bumper only (not between bumper and skin).   - Check site for bleeding q 4 hrs  - Clean site with soap and water daily and dry thoroughly.   - Make sure to flush the PEG with water BID to keep it patent  - The GI fellow will check the PEG within 24 hours.   - If the PEG  tube is accidentally removed at any time the patient should present immediately to the emergency department  - Contact the team if there are any post-placement concerns.     Patient was discussed with Dr. Mixon    Thank you for this interesting consult. Gastroenterology will continue to follow.  -During weekday hours of 7am-5pm please do not hesitate to contact me on [employer] Chat or page [phone] if there are any further questions between the weekday hours of 7 AM - 5 PM.   -After hours, on weekends, and on holidays, please page the on-call GI fellow at [phone]. Thank you.

## 2023-11-30 NOTE — ANESTHESIA POSTPROCEDURE EVALUATION
Patient: Valorie Montelongo    Procedure Summary       Date: 11/30/23 Room / Location: [facility]    Anesthesia Start: 1233 Anesthesia Stop: 1313    Procedure: EGD Diagnosis: Oropharyngeal dysphagia    Scheduled Providers: Ben Samuel MD; Kyle Ott DO; Funmilayo Louis RN Responsible Provider: Kyle Ott DO    Anesthesia Type: MAC ASA Status: 3            Anesthesia Type: MAC    Vitals Value Taken Time   /62 11/30/23 1346   Temp 37.2 °C (99 °F) 11/30/23 1313   Pulse 64 11/30/23 1346   Resp 17 11/30/23 1346   SpO2 95 % 11/30/23 1346       Anesthesia Post Evaluation    Patient location during evaluation: PACU  Patient participation: complete - patient participated  Level of consciousness: awake  Pain score: 0  Pain management: adequate  Multimodal analgesia pain management approach  Airway patency: patent  Two or more strategies used to mitigate risk of obstructive sleep apnea  Cardiovascular status: acceptable  Respiratory status: acceptable  Hydration status: acceptable  Postoperative Nausea and Vomiting: none  Comments: No nausea        There were no known notable events for this encounter.

## 2023-11-30 NOTE — CARE PLAN
The patient's goals for the shift include Rest and comfort    The clinical goals for the shift include pt will not fall throughout shift    Over the shift, the patient did not make progress toward the following goals. Barriers to progression include. Recommendations to address these barriers include.

## 2023-11-30 NOTE — ANESTHESIA PREPROCEDURE EVALUATION
Patient: Valorie Montelongo    Procedure Information       Date/Time: 11/30/23 1100    Scheduled providers: Ben Samuel MD; Kyle Ott DO; Funmilayo Louis RN    Procedure: EGD    Location: [facility]            Relevant Problems   Anesthesia (within normal limits)      Cardiovascular   (+) HTN (hypertension)   (+) Hyperlipidemia   (+) MI (myocardial infarction) (CMS/HCC)      Endocrine  Thyroidectomy      GI   (+) Gastroesophageal reflux disease      Neuro/Psych  Metastatic brain CA   (+) Anxiety      Pulmonary   (+) Chronic obstructive pulmonary disease (CMS/HCC)   (+) History of home oxygen therapy   (+) Pulmonary cancer (CMS/HCC)      GI/Hepatic  Dysphagia      Eyes, Ears, Nose, and Throat  Hx of thyroidectomy and neck radiation.       Clinical information reviewed:   Tobacco  Allergies  Meds  Problems  Med Hx  Surg Hx   Fam Hx  Soc   Hx        No data recorded       History reviewed. No pertinent past medical history.   Past Surgical History:   Procedure Laterality Date   • CT HEAD ANGIO W AND WO IV CONTRAST  4/12/2019    CT HEAD ANGIO W AND WO IV CONTRAST 4/12/2019 [facility]   • OTHER SURGICAL HISTORY  07/21/2021    Hysterectomy   • OTHER SURGICAL HISTORY  11/02/2021    Cholecystectomy   • OTHER SURGICAL HISTORY  11/02/2021    Cataract surgery   • OTHER SURGICAL HISTORY  12/03/2021    Lung surgery   • OTHER SURGICAL HISTORY  12/09/2021    Pleural biopsy   • OTHER SURGICAL HISTORY  12/09/2021    Lung wedge resection   • OTHER SURGICAL HISTORY  09/28/2021    Thyroidectomy total   • OTHER SURGICAL HISTORY  09/29/2021    Percutaneous endoscopic gastrostomy tube removal   • OTHER SURGICAL HISTORY  09/29/2021    Percutaneous endoscopic gastrostomy tube insertion         Current Outpatient Medications   Medication Instructions   • albuterol 90 mcg/actuation inhaler 2 puffs, inhalation, Every 6 hours PRN   • albuterol 2.5 mg, nebulization, Every 6 hours PRN   • amLODIPine  "(NORVASC) 5 mg, oral, Daily   • aspirin 81 mg, oral, Daily   • atorvastatin (LIPITOR) 10 mg, oral, Daily   • cholecalciferol (VITAMIN D-3) 2,000 Units, oral, Daily   • docusate sodium (COLACE) 100 mg, oral, As needed   • fesoterodine (TOVIAZ) 4 mg, oral, Daily   • HYDROcodone-acetaminophen (Norco)  mg tablet 1 tablet, oral, Every 4 hours PRN   • ipratropium (Atrovent) 21 mcg (0.03 %) nasal spray 2 sprays, Each Nostril, 3 times daily PRN   • irbesartan (AVAPRO) 150 mg, oral, Daily   • levothyroxine (SYNTHROID, LEVOXYL) 137 mcg, oral, Daily before breakfast   • LORazepam (ATIVAN) 1 mg, oral, Daily PRN   • ondansetron (ZOFRAN) 8 mg, oral, Every 8 hours PRN   • pantoprazole (PROTONIX) 40 mg, oral, Every 12 hours PRN   • vit A/vit C/vit E/zinc/copper (ICAPS AREDS ORAL) 1 capsule, oral, Daily      Allergies   Allergen Reactions   • Fluoxetine Insomnia   • Levofloxacin Itching and Rash        Chemistry    Lab Results   Component Value Date/Time     11/30/2023 0734    K 4.5 11/30/2023 0734     11/30/2023 0734    CO2 28 11/30/2023 0734    BUN 29 (H) 11/30/2023 0734    CREATININE 0.56 11/30/2023 0734    Lab Results   Component Value Date/Time    CALCIUM 8.6 11/30/2023 0734    ALKPHOS 62 11/30/2023 0734    AST 17 11/30/2023 0734    ALT 22 11/30/2023 0734    BILITOT 0.6 11/30/2023 0734          Lab Results   Component Value Date/Time    WBC 10.2 11/30/2023 0734    HGB 12.5 11/30/2023 0734    HCT 38.8 11/30/2023 0734     11/30/2023 0734     Lab Results   Component Value Date/Time    PROTIME 10.7 11/30/2023 0734    INR 1.0 11/30/2023 0734     No results found for this or any previous visit (from the past 4464 hour(s)).  No results found for this or any previous visit from the past 1095 days.       Visit Vitals  /68   Pulse 58   Temp 36.6 °C (97.9 °F)   Resp 19   Ht 1.626 m (5' 4\")   Wt 62.6 kg (138 lb)   SpO2 95%   BMI 23.69 kg/m²   OB Status Postmenopausal   BSA 1.68 m²        Anesthesia " Evaluation      Airway   Mallampati: I  TM distance: >3 FB  Neck ROM: full  Dental      Pulmonary    Cardiovascular     Rhythm: regular  Rate: normal    Neuro/Psych      GI/Hepatic/Renal      Endo/Other    Abdominal                     Physical Exam    Airway  Mallampati: I  TM distance: >3 FB  Neck ROM: full     Cardiovascular   Rhythm: regular  Rate: normal     Dental    Pulmonary    Abdominal           Anesthesia Plan    ASA 3     MAC     The patient is not a current smoker.    intravenous induction   Anesthetic plan and risks discussed with patient.  Use of blood products discussed with patient who consented to blood products.    Plan discussed with attending and CAA.

## 2023-11-30 NOTE — PROGRESS NOTES
"Nutrition Follow Up Assessment:   Nutrition Assessment       Patient is a 78 y.o. female with extensive oncology hx, who presented for new metastatic brain lesions & neurological changes.    Pt failed MBSS (11/25) with silent aspiration noted. DHT subsequently placed by ENT d/t thyroid mass narrowing trachea. TF initiated & pt with good tolerance.     Pt now amenable to PEG placement planned for today (11/30).      Nutrition History:  Food and Nutrient History: Visit made for follow up. Pt off floor for PEG placement at that time. Spoke with pts , who was in the room. He reports pt has been tolerating feeds without issue. Familiar with bolus feeds as pt has had a PEG in the past but would like education. RDN noted that pt/ would be provided education when pt is ready to start bolus regimen.  denies any questions or concerns at this time.  Energy Intake: Good > 75 % (tolerating TF)  Food Allergies/Intolerances:  None  GI Symptoms: None  Oral Problems: Swallowing difficulty    Anthropometrics:  Height: 162.6 cm (5' 4\")   Weight: 62.6 kg (138 lb)   BMI (Calculated): 23.68  IBW/kg (Dietitian Calculated): 55 kg  Percent of IBW: 114 %     Weight History:   Weight Change %:  Weight History / % Weight Change:   No additional weights in EMR since admission. Unable to assess for changes.    Nutrition Focused Physical Exam Findings:  defer: Refer to RDN assessment (11/25/23)    Edema:  Edema: none    Skin:  Intact    Nutrition Significant Labs:  BMP Trend:   Results from last 7 days   Lab Units 11/30/23  0734 11/29/23  0759 11/28/23  0841 11/27/23  0808   GLUCOSE mg/dL 114* 156* 109* 92   CALCIUM mg/dL 8.6 8.5* 8.7 8.8   SODIUM mmol/L 139 142 143 144   POTASSIUM mmol/L 4.5 4.7 4.5 4.2   CO2 mmol/L 28 30 30 31   CHLORIDE mmol/L 103 106 107 107   BUN mg/dL 29* 33* 31* 37*   CREATININE mg/dL 0.56 0.63 0.52 0.49*        Nutrition Specific Medications:  Ancef, Vitamin D, Decadron, Synthroid, Protonix    I/O: "   No recorded BM since admission.      Dietary Orders (From admission, onward)       Start     Ordered    11/30/23 0001  NPO Diet; Effective midnight  Diet effective midnight         11/29/23 1504                Estimated Needs:   Total Energy Estimated Needs (kCal): 1600 kCal  Method for Estimating Needs: MSJ = 1109 x 1.2-1.4  Total Protein Estimated Needs (g): 80-90 g  Method for Estimating Needs: 1.2-1.4gm/kg  Total Fluid Estimated Needs (mL)  1mL/kcal or per team        Nutrition Diagnosis   Nutrition Diagnosis:  Malnutrition Diagnosis  Patient has Malnutrition Diagnosis: Yes  Diagnosis Status: Ongoing  Malnutrition Diagnosis: Moderate malnutrition related to chronic disease or condition  As Evidenced by: moderate muscle mass loss and moderate subcutanous fat loss    Nutrition Diagnosis  Patient has Nutrition Diagnosis: Yes  Diagnosis Status (1): Ongoing  Nutrition Diagnosis 1: Swallowing difficulity  Related to (1): new brain mets  As Evidenced by (1): NPO status and need for PEG placement       Nutrition Interventions/Recommendations   Nutrition Interventions and Recommendations:        Nutrition Prescription:      1.) When ready to resume enteral feeds s/p PEG placement:          - Start Isosource 1.5 @ 20mL/hr & increase 10mL q 4-6hrs to goal rate of 50mL/hr x 22hrs          - Hold TF 1hr pre/post synthroid administration          - Defer free water flushes to team's discretion          - Provides: 1650 kcals, 75g protein, 840mL water        2.) Once tolerating at goal rate at least 24hrs, may consider bolus regimen:          - Isosource 1.5 bolus @ 275mL 4x/day (~4.5 cans per day)          - FWF: 60mL pre/post feed + additional 170mL BID (or per team, TF provides 840mL FW)          - Provides: 1100mL, 1650 kcals, 75g protein, & 1660mL        Time Spent/Follow-up Reminder:   Follow Up  Time Spent (min): 60 minutes  Last Date of Nutrition Visit: 11/30/23  Nutrition Follow-Up Needed?: Dietitian to reassess per  policy

## 2023-11-30 NOTE — PROGRESS NOTES
Physical Therapy                 Therapy Communication Note    Patient Name: Valorie Montelongo  MRN: [id number]  Today's Date: 11/30/2023     Discipline: Physical Therapy    Missed Visit Reason: Missed Visit Reason:  (Attempted follow up however pt. off division.)    Missed Time: Attempt    Comment:

## 2023-11-30 NOTE — H&P
"History Of Present Illness  Valorie Montelongo is a 78 y.o. female with a h/o metastatic lung cancer (s/p VATS RAYRAY and LLL resection at  11/18/21 but now follows with oncology at [facility]), h/o papillary thyroid cancer (s/p thyroidectomy 7/2021), oral cancer (s/p resection and XRT), HTN, HLD, NSTEMI (11/25/21), COPD (on 2-3L at baseline), GERD, and anxiety who presented as a transfer from [facility] for neurosurgery evaluation after CT head at OSH showed new metastatic brain lesions On 11/23/2023. GI is consulted for PEG placement. Proceed with EGD +/- PEG as documented in 11/29/23 consult note     Past Medical History  History reviewed. No pertinent past medical history.    Surgical History  Past Surgical History:   Procedure Laterality Date    CT HEAD ANGIO W AND WO IV CONTRAST  4/12/2019    CT HEAD ANGIO W AND WO IV CONTRAST 4/12/2019 [facility]    OTHER SURGICAL HISTORY  07/21/2021    Hysterectomy    OTHER SURGICAL HISTORY  11/02/2021    Cholecystectomy    OTHER SURGICAL HISTORY  11/02/2021    Cataract surgery    OTHER SURGICAL HISTORY  12/03/2021    Lung surgery    OTHER SURGICAL HISTORY  12/09/2021    Pleural biopsy    OTHER SURGICAL HISTORY  12/09/2021    Lung wedge resection    OTHER SURGICAL HISTORY  09/28/2021    Thyroidectomy total    OTHER SURGICAL HISTORY  09/29/2021    Percutaneous endoscopic gastrostomy tube removal    OTHER SURGICAL HISTORY  09/29/2021    Percutaneous endoscopic gastrostomy tube insertion        Social History  She has no history on file for tobacco use, alcohol use, and drug use.    Family History  No family history on file.     Allergies  Fluoxetine and Levofloxacin    Review of Systems   See prior consult note  Physical Exam   No changes since consult note.  Last Recorded Vitals  Blood pressure 147/68, pulse 58, temperature 36.6 °C (97.9 °F), resp. rate 19, height 1.626 m (5' 4\"), weight 62.6 kg (138 lb), SpO2 95 %.    Relevant Results           "   Assessment/Plan   Principal Problem:    Metastatic adenocarcinoma (CMS/HCC)  Active Problems:    Pulmonary cancer (CMS/HCC)    HTN (hypertension)    Hyperlipidemia    MI (myocardial infarction) (CMS/HCC)    Chronic obstructive pulmonary disease (CMS/HCC)    History of home oxygen therapy    Gastroesophageal reflux disease    Anxiety             I spent  minutes in the professional and overall care of this patient.      Cande Galarza MD

## 2023-11-30 NOTE — PROGRESS NOTES
"Valorie Montelongo is a 78 y.o. female on day 7 of admission presenting with Metastatic adenocarcinoma (CMS/HCC).    Subjective   Patient seen at bedside, A+OX3, States that she is doing well today. We discussed plans for PEG by GI today and  and patient agreed.  will also call oncologist Dr. Mathias to make follow up apt. Patient again declined SNF placement.  Denies any fevers/chills, SOB, CP, heart palpitations, headaches dizziness or vision changes.        Objective     Physical Exam  HENT:      Head: Normocephalic.      Comments: Dobhoff in place     Nose: Nose normal.      Mouth/Throat:      Mouth: Mucous membranes are moist.   Eyes:      Pupils: Pupils are equal, round, and reactive to light.   Cardiovascular:      Rate and Rhythm: Normal rate and regular rhythm.   Pulmonary:      Effort: Pulmonary effort is normal.   Abdominal:      Palpations: Abdomen is soft.   Musculoskeletal:         General: No swelling. Normal range of motion.      Cervical back: Normal range of motion.      Comments: Decreased strength b/l lower ext   Skin:     General: Skin is warm.   Neurological:      Mental Status: She is alert and oriented to person, place, and time.      Comments: Cranial nerves intact, sensation intact   Psychiatric:         Mood and Affect: Mood normal.         Behavior: Behavior normal.       Last Recorded Vitals  Blood pressure 140/76, pulse 59, temperature 36.4 °C (97.5 °F), resp. rate 17, height 1.626 m (5' 4\"), weight 62.6 kg (138 lb), SpO2 98 %.  Intake/Output last 3 Shifts:  I/O last 3 completed shifts:  In: 450 (7.2 mL/kg) [NG/GT:450]  Out: 1450 (23.2 mL/kg) [Urine:1450 (0.6 mL/kg/hr)]  Weight: 62.6 kg     Relevant Results    Malnutrition Diagnosis Status: New  Malnutrition Diagnosis: Moderate malnutrition related to chronic disease or condition  As Evidenced by: moderate muscle mass loss and moderate subcutanous fat loss  I agree with the dietitian's malnutrition diagnosis.    Assessment/Plan "   Principal Problem:    Metastatic adenocarcinoma (CMS/HCC)  Active Problems:    Pulmonary cancer (CMS/HCC)    HTN (hypertension)    Hyperlipidemia    MI (myocardial infarction) (CMS/HCC)    Chronic obstructive pulmonary disease (CMS/HCC)    History of home oxygen therapy    Gastroesophageal reflux disease    Anxiety    Valorie Montelongo is a 79 y/o F w h/o metastatic lung cancer (s/p VATS RAYRAY and LLL resection at  11/18/21 but now follows with oncology at [facility]), h/o papillary thyroid cancer (s/p thyroidectomy 7/2021), oral cancer (s/p resection and XRT), HTN, HLD, NSTEMI (11/25/21), COPD (on 2-3L at baseline), GERD, and anxiety who presented as a transfer from [facility] for neurosurgery evaluation as CT head at OSH showed new metastatic brain lesions. Patient has been experiencing b/l LE weakness, gait changes and speech difficulties x 10 days, and have progressively gotten worse. MRI brain without contrast (11/23) showed multiple brain metastases with surrounding vasogenic edema. MRI whole spine (11/23) without evidence of spinal metastasis or cord compression. NSGY consulted on admit, rec no acute surgical intervention and rec to consult Rad Onc. Started on dexamethasone 4mg q6h + keppra 500mg BID per NSGY, also received MRI brain with and without 11/24. SLP consulted-failed MBSS. 11/25 ENT was consulted for Dobbhoff placement d/t thyroid mass narrowing trachea. Rad Onc consulted 11/24-recs appreciated. Oncology consulted 11/24-recs appreciated. GI consulted for PEG placement.     # New brain metastases / bilateral weakness   - hx of metastatic lung cancer and metastatic papillary thyroid cancer; experiencing progressive b/l LE weakness, speech and gait changes  - OSH 11/22/23 CT head shows multiple masses and vasogenic edema- patient transferred to [facility] for further evaluation   - CT whole spine without contrast (11/23/23): No CT evidence of metastatic disease in the spine, degernative  changes and scoliosis noted   - MRI whole spine without contrast (11/23/23): no lesions noted within the cervical, thoracic or lumbar spine, no significant spinal canal or neural foraminal stenosis   - MRI brain without contrast (11/23/23): numerus cerebral lesions scattered throughout the b/l cerebral hemispheres with significant sorrouning vasogenic edema, components of hemorrhage - however exam is somewhat limited due to non-contrast examination   - MRI brain with and without contrast under anesthesia 11/24   - NSGY consulted  on admit - rec no acute surgical intervention and Rad Onc consult, start dex + keppra + MRI brain w/wo   - (11/23- current) continue dexamethasone 4mg q6h + PPI support per neurosurgery recs   - (11/23- current) continue Keppra 500mg BID per neurosurgery recs- currently IV, plan to transition to PO pending SLP eval   - Q4 neuro checks   - PT/OT consulted, rec SNF   - Rad Onc consulted, recs appreciated, patient can get her radiation done at [facility] on discharge   - Oncology consulted, recs appreciated, will follow up with Dr. Mathias at [facility] on discharge      # Lung adenocarcinoma   - follows with Dr. David Mathias at [facility]   - s/p VATs RAYRAY and LLL resection at  on 11/18/2021   - oncological history limited as community records have limited oncologic information   -Alectinib 1/2022 - 12/2022. It was stopped at that time due to what was thought to be pneumonitis. No further treatment since then  - CT chest with contrast (11/23): increased soft tissue thickening in the left thyoid surgical bed with probable involvement of the left cervical lymph node, dozens of bilateral pulmonary nodules of which a few are new from previous CT (11/2021), new enlargement of aortopulmonary window lymph nose and right interlobar lymph nodes suspicious for metastatic disease.   - Dr. David Mathias aware of patient admission     # H/o papillary thyroid cancer / oral cancer / acquired hypothyroidism   - s/p s/p  thyroidectomy 7/2021  - TSH level 0.04 11/24  - T4 1.38   - Continue with home levothyroxine 137 mcg daily before breakfast  - ENT consulted, Per ENT thyroid mass likely is unresectable, would consider FNA of thyroid mass to assess for targetable mutations that could benefit from additional medical treatment.  Also discussed with patient the recommendation for vocal cord injection which may be performed here on an inpatient basis by one of our laryngologist versus by Dr. Perkins and his team if patient pursues transfer to [facility] for radiation treatment.       # Progressive dysphagia   - SLP consulted on admission I/s/o new brain mets and occasional reported dysphagia  - MBSS 11/25 showing severe impairment  - 11/25 ENT placed Dobbhoff then likely transition to PEG  - 11/25 ENT consulted for Dobbhoff placement d/t thyroid mass invading trachea.   - Dietician consulted for PEG feeding 11/30  - 11/25 started on TF Isosource 1.5 goal 45 mL/hour        -  GI consulted for PEG placement, Plan PEG today      # HTN  - continue with home amlodipine 5mg daily, home Irbesartan substituted for losartan 50mg daily while inpatient      # CAD with H /o NSTEMI  - NSTEMI 11/25/2021   - continue with home atorvastatin 10mg nightly and daily ASA     # COPD  - former smoker, baseline oxygen continuous 2-3L at home   - continue with home Ipratropium 21mcg 2 sprays TID PRN shortness of breath   - continue with home albuterol 90mcg q6 hours PRN wheezing      # Chronic back pain   - CT spine and MRI without contrast (11/23/23): without evidence of spinal metastasis or cord compression   - continue home gabapentin 300mg TID, Norco 5-325mg q4 hours PRN moderate pain, flexeril 10mg TID      # Overreactive bladder   - Home Toviaz substituted for oxybutynin 5mg BID while inpatient   - Harvey in place for urinary retention, will do TOV today      # Anxiety   - continue with home ativan PRN anxiety      # DVT prophy / misc  - hold  prophwillsi Marquisx with MRI brain without contrast suspicious components of hemorrhage  - PPI support with dexamethasone      # Dispo   - FULL CODE, confirmed on admission   - Primary oncologist: Dr. Mathias - notified of admission 11/27   -  José Manuel: [phone] - updated at bedside 11/28  - Discharge home after PEG placement to follow up with Dr. Mathias and rad/onc at [facility]      I spent 60 minutes in the professional and overall care of this patient.    [facility] AroraC

## 2023-11-30 NOTE — PROGRESS NOTES
Occupational Therapy                 Therapy Communication Note    Patient Name: Valorie Montelongo  MRN: [id number]  Today's Date: 11/30/2023     Discipline: Occupational Therapy    Missed Visit Reason: Missed Visit Reason: Patient in a medical procedure    Missed Time: Attempt

## 2023-12-01 ENCOUNTER — PHARMACY VISIT (OUTPATIENT)
Dept: PHARMACY | Facility: CLINIC | Age: 78
End: 2023-12-01
Payer: MEDICARE

## 2023-12-01 ENCOUNTER — HOSPITAL ENCOUNTER (OUTPATIENT)
Dept: RADIATION ONCOLOGY | Facility: HOSPITAL | Age: 78
Setting detail: RADIATION/ONCOLOGY SERIES
Discharge: HOME | End: 2023-12-01
Payer: COMMERCIAL

## 2023-12-01 DIAGNOSIS — C79.31 SECONDARY MALIGNANT NEOPLASM OF BRAIN AND SPINAL CORD (MULTI): Primary | ICD-10-CM

## 2023-12-01 DIAGNOSIS — C79.49 SECONDARY MALIGNANT NEOPLASM OF BRAIN AND SPINAL CORD (MULTI): Primary | ICD-10-CM

## 2023-12-01 LAB
ALBUMIN SERPL BCP-MCNC: 3.2 G/DL (ref 3.4–5)
ALP SERPL-CCNC: 62 U/L (ref 33–136)
ALT SERPL W P-5'-P-CCNC: 22 U/L (ref 7–45)
ANION GAP SERPL CALC-SCNC: 12 MMOL/L (ref 10–20)
AST SERPL W P-5'-P-CCNC: 17 U/L (ref 9–39)
BASOPHILS # BLD AUTO: 0.01 X10*3/UL (ref 0–0.1)
BASOPHILS NFR BLD AUTO: 0.1 %
BILIRUB SERPL-MCNC: 0.5 MG/DL (ref 0–1.2)
BUN SERPL-MCNC: 28 MG/DL (ref 6–23)
CALCIUM SERPL-MCNC: 8.6 MG/DL (ref 8.6–10.6)
CHLORIDE SERPL-SCNC: 101 MMOL/L (ref 98–107)
CO2 SERPL-SCNC: 28 MMOL/L (ref 21–32)
CREAT SERPL-MCNC: 0.58 MG/DL (ref 0.5–1.05)
EOSINOPHIL # BLD AUTO: 0 X10*3/UL (ref 0–0.4)
EOSINOPHIL NFR BLD AUTO: 0 %
ERYTHROCYTE [DISTWIDTH] IN BLOOD BY AUTOMATED COUNT: 12.2 % (ref 11.5–14.5)
GFR SERPL CREATININE-BSD FRML MDRD: >90 ML/MIN/1.73M*2
GLUCOSE SERPL-MCNC: 158 MG/DL (ref 74–99)
HCT VFR BLD AUTO: 40.1 % (ref 36–46)
HGB BLD-MCNC: 12.6 G/DL (ref 12–16)
IMM GRANULOCYTES # BLD AUTO: 0.11 X10*3/UL (ref 0–0.5)
IMM GRANULOCYTES NFR BLD AUTO: 1.3 % (ref 0–0.9)
LYMPHOCYTES # BLD AUTO: 0.26 X10*3/UL (ref 0.8–3)
LYMPHOCYTES NFR BLD AUTO: 3.1 %
MCH RBC QN AUTO: 30.1 PG (ref 26–34)
MCHC RBC AUTO-ENTMCNC: 31.4 G/DL (ref 32–36)
MCV RBC AUTO: 96 FL (ref 80–100)
MONOCYTES # BLD AUTO: 0.54 X10*3/UL (ref 0.05–0.8)
MONOCYTES NFR BLD AUTO: 6.5 %
NEUTROPHILS # BLD AUTO: 7.38 X10*3/UL (ref 1.6–5.5)
NEUTROPHILS NFR BLD AUTO: 89 %
NRBC BLD-RTO: 0 /100 WBCS (ref 0–0)
PLATELET # BLD AUTO: 167 X10*3/UL (ref 150–450)
POTASSIUM SERPL-SCNC: 4.3 MMOL/L (ref 3.5–5.3)
PROT SERPL-MCNC: 5.4 G/DL (ref 6.4–8.2)
RBC # BLD AUTO: 4.19 X10*6/UL (ref 4–5.2)
SODIUM SERPL-SCNC: 137 MMOL/L (ref 136–145)
WBC # BLD AUTO: 8.3 X10*3/UL (ref 4.4–11.3)

## 2023-12-01 PROCEDURE — 2500000001 HC RX 250 WO HCPCS SELF ADMINISTERED DRUGS (ALT 637 FOR MEDICARE OP)

## 2023-12-01 PROCEDURE — 97110 THERAPEUTIC EXERCISES: CPT | Mod: GP,CQ

## 2023-12-01 PROCEDURE — 36415 COLL VENOUS BLD VENIPUNCTURE: CPT

## 2023-12-01 PROCEDURE — 2500000004 HC RX 250 GENERAL PHARMACY W/ HCPCS (ALT 636 FOR OP/ED)

## 2023-12-01 PROCEDURE — 77334 RADIATION TREATMENT AID(S): CPT | Performed by: RADIOLOGY

## 2023-12-01 PROCEDURE — 77290 THER RAD SIMULAJ FIELD CPLX: CPT | Performed by: RADIOLOGY

## 2023-12-01 PROCEDURE — 2500000004 HC RX 250 GENERAL PHARMACY W/ HCPCS (ALT 636 FOR OP/ED): Performed by: NURSE PRACTITIONER

## 2023-12-01 PROCEDURE — RXMED WILLOW AMBULATORY MEDICATION CHARGE

## 2023-12-01 PROCEDURE — 80053 COMPREHEN METABOLIC PANEL: CPT

## 2023-12-01 PROCEDURE — 1170000001 HC PRIVATE ONCOLOGY ROOM DAILY

## 2023-12-01 PROCEDURE — 97116 GAIT TRAINING THERAPY: CPT | Mod: GP,CQ

## 2023-12-01 PROCEDURE — 2500000001 HC RX 250 WO HCPCS SELF ADMINISTERED DRUGS (ALT 637 FOR MEDICARE OP): Performed by: PHYSICIAN ASSISTANT

## 2023-12-01 PROCEDURE — 85025 COMPLETE CBC W/AUTO DIFF WBC: CPT

## 2023-12-01 PROCEDURE — 99233 SBSQ HOSP IP/OBS HIGH 50: CPT

## 2023-12-01 PROCEDURE — C9113 INJ PANTOPRAZOLE SODIUM, VIA: HCPCS | Performed by: NURSE PRACTITIONER

## 2023-12-01 PROCEDURE — 77263 THER RADIOLOGY TX PLNG CPLX: CPT | Performed by: NEUROLOGICAL SURGERY

## 2023-12-01 RX ORDER — PANTOPRAZOLE SODIUM 40 MG/1
40 TABLET, DELAYED RELEASE ORAL
Qty: 30 TABLET | Refills: 1 | Status: SHIPPED | OUTPATIENT
Start: 2023-12-01 | End: 2024-01-30

## 2023-12-01 RX ORDER — LEVETIRACETAM 100 MG/ML
500 SOLUTION ORAL 2 TIMES DAILY
Status: DISCONTINUED | OUTPATIENT
Start: 2023-12-01 | End: 2023-12-02 | Stop reason: HOSPADM

## 2023-12-01 RX ORDER — LEVETIRACETAM 100 MG/ML
500 SOLUTION ORAL 2 TIMES DAILY
Qty: 300 ML | Refills: 1 | Status: SHIPPED | OUTPATIENT
Start: 2023-12-01 | End: 2024-11-30

## 2023-12-01 RX ORDER — LEVETIRACETAM 500 MG/1
500 TABLET ORAL 2 TIMES DAILY
Status: DISCONTINUED | OUTPATIENT
Start: 2023-12-01 | End: 2023-12-01 | Stop reason: CLARIF

## 2023-12-01 RX ADMIN — HYDROCODONE BITARTRATE AND ACETAMINOPHEN 2 TABLET: 5; 325 TABLET ORAL at 23:17

## 2023-12-01 RX ADMIN — DEXAMETHASONE INTENSOL 4 MG: 1 SOLUTION, CONCENTRATE ORAL at 21:41

## 2023-12-01 RX ADMIN — DEXAMETHASONE SODIUM PHOSPHATE 4 MG: 4 INJECTION, SOLUTION INTRAMUSCULAR; INTRAVENOUS at 12:14

## 2023-12-01 RX ADMIN — GABAPENTIN 300 MG: 250 SOLUTION ORAL at 06:07

## 2023-12-01 RX ADMIN — AMLODIPINE BESYLATE 5 MG: 5 TABLET ORAL at 08:07

## 2023-12-01 RX ADMIN — ATORVASTATIN CALCIUM 10 MG: 10 TABLET, FILM COATED ORAL at 21:42

## 2023-12-01 RX ADMIN — ASPIRIN 81 MG CHEWABLE TABLET 81 MG: 81 TABLET CHEWABLE at 08:07

## 2023-12-01 RX ADMIN — LORAZEPAM 1 MG: 1 TABLET ORAL at 23:17

## 2023-12-01 RX ADMIN — OXYBUTYNIN CHLORIDE 5 MG: 5 TABLET ORAL at 08:07

## 2023-12-01 RX ADMIN — GABAPENTIN 300 MG: 250 SOLUTION ORAL at 21:41

## 2023-12-01 RX ADMIN — LEVETIRACETAM 500 MG: 500 SOLUTION ORAL at 21:41

## 2023-12-01 RX ADMIN — LEVOTHYROXINE SODIUM 137 MCG: 137 TABLET ORAL at 06:07

## 2023-12-01 RX ADMIN — DEXAMETHASONE SODIUM PHOSPHATE 4 MG: 4 INJECTION, SOLUTION INTRAMUSCULAR; INTRAVENOUS at 06:07

## 2023-12-01 RX ADMIN — OXYBUTYNIN CHLORIDE 5 MG: 5 TABLET ORAL at 21:42

## 2023-12-01 RX ADMIN — Medication 50 MCG: at 08:07

## 2023-12-01 RX ADMIN — GABAPENTIN 300 MG: 250 SOLUTION ORAL at 14:19

## 2023-12-01 RX ADMIN — LEVETIRACETAM 500 MG: 5 INJECTION INTRAVENOUS at 08:07

## 2023-12-01 RX ADMIN — PANTOPRAZOLE SODIUM 40 MG: 40 INJECTION, POWDER, FOR SOLUTION INTRAVENOUS at 06:07

## 2023-12-01 RX ADMIN — LOSARTAN POTASSIUM 50 MG: 50 TABLET, FILM COATED ORAL at 08:07

## 2023-12-01 ASSESSMENT — COGNITIVE AND FUNCTIONAL STATUS - GENERAL
TURNING FROM BACK TO SIDE WHILE IN FLAT BAD: A LITTLE
WALKING IN HOSPITAL ROOM: A LITTLE
MOBILITY SCORE: 17
MOVING TO AND FROM BED TO CHAIR: A LITTLE
CLIMB 3 TO 5 STEPS WITH RAILING: A LOT
STANDING UP FROM CHAIR USING ARMS: A LITTLE
MOVING FROM LYING ON BACK TO SITTING ON SIDE OF FLAT BED WITH BEDRAILS: A LITTLE

## 2023-12-01 ASSESSMENT — PAIN SCALES - GENERAL
PAINLEVEL_OUTOF10: 7
PAINLEVEL_OUTOF10: 3

## 2023-12-01 ASSESSMENT — PAIN - FUNCTIONAL ASSESSMENT
PAIN_FUNCTIONAL_ASSESSMENT: 0-10
PAIN_FUNCTIONAL_ASSESSMENT: 0-10

## 2023-12-01 ASSESSMENT — PAIN SCALES - WONG BAKER: WONGBAKER_NUMERICALRESPONSE: HURTS LITTLE BIT

## 2023-12-01 NOTE — PROGRESS NOTES
Physical Therapy                 Therapy Communication Note    Patient Name: Valorie Montelongo  MRN: [id number]  Today's Date: 12/1/2023     Discipline: Physical Therapy    Missed Visit Reason: Missed Visit Reason:  (Attempted follow up however pt. off div at scan per CTA)    Missed Time: Attempt    Comment:

## 2023-12-01 NOTE — PROGRESS NOTES
Pt will discharge possibly this weekend with [facility]. Pt had peg tube placed yesterday. Script for TF pending. Initial info sent to [facility] and has been accepted. Pt requested transfer wheelchair and hospital bed. Script for Hospital bed pending. [facility] is out of hospital beds. Referral for transfer wheelchair initiated in the [employer] for [employer]. Will need [employer] for Hospital bed. Team aware. Nani Roach RN TCC

## 2023-12-01 NOTE — PROGRESS NOTES
"Valorie Montelongo is a 78 y.o. female on day 8 of admission presenting with Metastatic adenocarcinoma (CMS/HCC).    Subjective   Pt seen at bedside this afternoon with  and family at bedside. She tolerated CT sim well this morning. TF currently off in room but pt reports she has been tolerating it since it started yesterday evening without issue. We discussed per Nutrition's recs, that once tolerating at goal rate for at least 24 hr, she can transition to bolus feeds and then discharge. D/w  and awaiting to hear from Rad/Onc about schedule.        Objective     Physical Exam  Vitals reviewed.   Constitutional:       Appearance: Normal appearance.   HENT:      Head: Normocephalic.      Right Ear: External ear normal.      Left Ear: External ear normal.      Nose: Nose normal.   Eyes:      Extraocular Movements: Extraocular movements intact.      Conjunctiva/sclera: Conjunctivae normal.      Pupils: Pupils are equal, round, and reactive to light.   Cardiovascular:      Rate and Rhythm: Normal rate and regular rhythm.   Pulmonary:      Effort: Pulmonary effort is normal.   Abdominal:      General: Bowel sounds are normal.      Palpations: Abdomen is soft.      Comments: PEG site intact, dried blood   Musculoskeletal:      Cervical back: Normal range of motion.      Comments: Generalized weakness, sitting in chair   Skin:     General: Skin is warm and dry.   Neurological:      General: No focal deficit present.      Mental Status: She is alert and oriented to person, place, and time. Mental status is at baseline.   Psychiatric:         Mood and Affect: Mood normal.         Behavior: Behavior normal.         Thought Content: Thought content normal.         Last Recorded Vitals  Blood pressure 162/70, pulse 62, temperature 37.2 °C (99 °F), temperature source Temporal, resp. rate 16, height 1.626 m (5' 4\"), weight 62.6 kg (138 lb), SpO2 95 %.  Intake/Output last 3 Shifts:  I/O last 3 completed shifts:  In: 300 " (4.8 mL/kg) [IV Piggyback:300]  Out: 1000 (16 mL/kg) [Urine:1000 (0.4 mL/kg/hr)]  Weight: 62.6 kg     Relevant Results     .Scheduled medications  amLODIPine, 5 mg, nasogastric tube, Daily  aspirin, 81 mg, oral, Daily  atorvastatin, 10 mg, oral, Nightly  ceFAZolin, 1 g, intravenous, Once  cholecalciferol, 50 mcg, oral, Daily  dexAMETHasone, 4 mg, intravenous, q6h  gabapentin, 300 mg, oral, q8h GRETCHEN  levETIRAcetam, 500 mg, intravenous, q12h  levothyroxine, 137 mcg, nasogastric tube, Daily before breakfast  losartan, 50 mg, nasogastric tube, Daily  oxybutynin, 5 mg, oral, BID  pantoprazole, 40 mg, intravenous, Daily before breakfast      Continuous medications     PRN medications  PRN medications: albuterol, alteplase, cyclobenzaprine, docusate sodium, HYDROcodone-acetaminophen, ipratropium, LORazepam, oxygen    .  Results for orders placed or performed during the hospital encounter of 11/23/23 (from the past 24 hour(s))   POCT GLUCOSE   Result Value Ref Range    POCT Glucose 115 (H) 74 - 99 mg/dL   CBC and Auto Differential   Result Value Ref Range    WBC 8.3 4.4 - 11.3 x10*3/uL    nRBC 0.0 0.0 - 0.0 /100 WBCs    RBC 4.19 4.00 - 5.20 x10*6/uL    Hemoglobin 12.6 12.0 - 16.0 g/dL    Hematocrit 40.1 36.0 - 46.0 %    MCV 96 80 - 100 fL    MCH 30.1 26.0 - 34.0 pg    MCHC 31.4 (L) 32.0 - 36.0 g/dL    RDW 12.2 11.5 - 14.5 %    Platelets 167 150 - 450 x10*3/uL    Neutrophils % 89.0 40.0 - 80.0 %    Immature Granulocytes %, Automated 1.3 (H) 0.0 - 0.9 %    Lymphocytes % 3.1 13.0 - 44.0 %    Monocytes % 6.5 2.0 - 10.0 %    Eosinophils % 0.0 0.0 - 6.0 %    Basophils % 0.1 0.0 - 2.0 %    Neutrophils Absolute 7.38 (H) 1.60 - 5.50 x10*3/uL    Immature Granulocytes Absolute, Automated 0.11 0.00 - 0.50 x10*3/uL    Lymphocytes Absolute 0.26 (L) 0.80 - 3.00 x10*3/uL    Monocytes Absolute 0.54 0.05 - 0.80 x10*3/uL    Eosinophils Absolute 0.00 0.00 - 0.40 x10*3/uL    Basophils Absolute 0.01 0.00 - 0.10 x10*3/uL   Comprehensive Metabolic  Panel   Result Value Ref Range    Glucose 158 (H) 74 - 99 mg/dL    Sodium 137 136 - 145 mmol/L    Potassium 4.3 3.5 - 5.3 mmol/L    Chloride 101 98 - 107 mmol/L    Bicarbonate 28 21 - 32 mmol/L    Anion Gap 12 10 - 20 mmol/L    Urea Nitrogen 28 (H) 6 - 23 mg/dL    Creatinine 0.58 0.50 - 1.05 mg/dL    eGFR >90 >60 mL/min/1.73m*2    Calcium 8.6 8.6 - 10.6 mg/dL    Albumin 3.2 (L) 3.4 - 5.0 g/dL    Alkaline Phosphatase 62 33 - 136 U/L    Total Protein 5.4 (L) 6.4 - 8.2 g/dL    AST 17 9 - 39 U/L    Bilirubin, Total 0.5 0.0 - 1.2 mg/dL    ALT 22 7 - 45 U/L       Assessment/Plan   Principal Problem:    Metastatic adenocarcinoma (CMS/HCC)  Active Problems:    Pulmonary cancer (CMS/HCC)    HTN (hypertension)    Hyperlipidemia    MI (myocardial infarction) (CMS/HCC)    Chronic obstructive pulmonary disease (CMS/HCC)    History of home oxygen therapy    Gastroesophageal reflux disease    Anxiety    Valorie Montelongo is a 79 y/o F w h/o metastatic lung cancer (s/p VATS RAYRAY and LLL resection at  11/18/21 but now follows with oncology at [facility]), h/o papillary thyroid cancer (s/p thyroidectomy 7/2021), oral cancer (s/p resection and XRT), HTN, HLD, NSTEMI (11/25/21), COPD (on 2-3L at baseline), GERD, and anxiety who presented 11/23 as a transfer from [facility] for NSGY evaluation as CT head at OSH showed new metastatic brain lesions. Patient had been experiencing b/l LE weakness, gait changes and speech difficulties x 10 days, and have progressively gotten worse. MRI brain without contrast (11/23) showed multiple brain metastases with surrounding vasogenic edema. MRI whole spine (11/23) without evidence of spinal metastasis or cord compression. NSGY consulted on admit, rec no acute surgical intervention and rec to consult Rad Onc. Started on dexamethasone 4mg q6h + keppra 500mg BID per NSGY (plan to cont after discharge), also received MRI brain with and without 11/24. SLP consulted, failed MBSS. Of note,  pt/family wanted transfer back to [facility], (11/27-11/29) there was multiple communications held for transfer but ultimately rejected by SW 11/29 and plan to cont remaining here at [facility], 11/25 ENT was consulted for Dobbhoff placement d/t thyroid mass narrowing trachea. Rad Onc consulted 11/24, 11/30 s/p CT sim plan for OP RT to start within 7-10 days. Oncology consulted 11/24, will resume care OP with Dr. Mathias. GI consulted for PEG placement, 11/30 s/p PEG. 12/1 Pt at goal rate for TF, per nutrition once tolerating goal rate for at least x24 hr, transition to bolus feeds. DC home pending tolerance of TF.         # Progressive dysphagia   - SLP consulted on admission I/s/o new brain mets and occasional reported dysphagia  - MBSS 11/25 showing severe impairment  - 11/25 ENT placed Dobbhoff then rec likely transition to PEG  - 11/25 ENT consulted for Dobbhoff placement d/t thyroid mass invading trachea.   - Dietician consulted for PEG feeding 11/30  -  GI consulted for PEG placement, s/p PEG placement 11/30  - Per Nutrition: Start TF Isosource 1.5 @ 20mL/hr & increase 10mL q 4-6hrs to goal rate of 50mL/hr x 22hrs, Once tolerating at goal rate at least 24hrs, may consider bolus regimen:          - Isosource 1.5 bolus @ 275mL 4x/day (~4.5 cans per day)  - 12/1 Met goal rate 50cc/h in AM, will cont monitoring for tolerance x24 hr per above recs and then transition to bolus feeds 12/2      # New brain metastases / bilateral weakness   - hx of metastatic lung cancer and metastatic papillary thyroid cancer; experiencing progressive b/l LE weakness, speech and gait changes  - OSH 11/22/23 CT head shows multiple masses and vasogenic edema- patient transferred to [facility] for further evaluation   - CT whole spine without contrast (11/23/23): No CT evidence of metastatic disease in the spine, degernative changes and scoliosis noted   - MRI whole spine without contrast (11/23/23): no lesions noted within the cervical, thoracic  or lumbar spine, no significant spinal canal or neural foraminal stenosis   - MRI brain without contrast (11/23/23): numerus cerebral lesions scattered throughout the b/l cerebral hemispheres with significant sorrouning vasogenic edema, components of hemorrhage - however exam is somewhat limited due to non-contrast examination   - 11/24 MRI brain with and without contrast under anesthesia  showed numerous scattered enhancing lesions of various sizes  within cerebral hemispheres bilaterally   - NSGY consulted  on admit - rec no acute surgical intervention and Rad Onc consult, started dex + keppra + MRI brain w/wo   - (11/23- current) continue dexamethasone 4mg q6h + PPI support per neurosurgery recs   - (11/23- current) continue Keppra 500mg BID per neurosurgery recs- s/p IV, transitioned to PO   - c/w Q4 neuro checks   - PT/OT consulted, rec SNF   - Rad Onc consulted, recs appreciated, patient can get her radiation done at [facility] on discharge   - Oncology consulted, recs appreciated, will follow up with Dr. Mathias at [facility] on discharge      # Lung adenocarcinoma   - follows with Dr. David Mathias at [facility]   - s/p VATs RAYRAY and LLL resection at  on 11/18/2021   - oncological history limited as community records have limited oncologic information   -Alectinib 1/2022 - 12/2022. It was stopped at that time due to what was thought to be pneumonitis. No further treatment since then  - CT chest with contrast (11/23): increased soft tissue thickening in the left thyoid surgical bed with probable involvement of the left cervical lymph node, dozens of bilateral pulmonary nodules of which a few are new from previous CT (11/2021), new enlargement of aortopulmonary window lymph nose and right interlobar lymph nodes suspicious for metastatic disease.   - Dr. David Mathias aware of patient admission     # H/o papillary thyroid cancer / oral cancer / acquired hypothyroidism   - s/p s/p thyroidectomy 7/2021  - TSH level 0.04  11/24  - T4 1.38   - Continue with home levothyroxine 137 mcg daily before breakfast  - ENT consulted, Per ENT thyroid mass likely is unresectable, would consider FNA of thyroid mass to assess for targetable mutations that could benefit from additional medical treatment.  Also discussed with patient the recommendation for vocal cord injection which may be performed here on an inpatient basis by one of our laryngologist versus by Dr. Perkins and his team if patient pursues transfer to [facility] for radiation treatment.       # HTN  - continue with home amlodipine 5mg daily, home Irbesartan substituted for losartan 50mg daily while inpatient      # CAD with H /o NSTEMI  - NSTEMI 11/25/2021   - continue with home atorvastatin 10mg nightly and daily ASA     # COPD  - former smoker, baseline oxygen continuous 2-3L at home   - continue with home Ipratropium 21mcg 2 sprays TID PRN shortness of breath   - continue with home albuterol 90mcg q6 hours PRN wheezing      # Chronic back pain   - CT spine and MRI without contrast (11/23/23): without evidence of spinal metastasis or cord compression   - continue home gabapentin 300mg TID, Norco 5-325mg q4 hours PRN moderate pain, flexeril 10mg TID      # Overreactive bladder   - Home Toviaz substituted for oxybutynin 5mg BID while inpatient   - Harvey in place for urinary retention, will do TOV today      # Anxiety   - continue with home ativan PRN anxiety      # DVT prophy / misc  - hold prophy Lovenox with MRI brain without contrast suspicious components of hemorrhage  - PPI support with dexamethasone      # Dispo   - FULL CODE, confirmed on admission   - Primary oncologist: Dr. Mathias - notified of admission 11/27   -  José Manuel: [phone] - updated at bedside 12/1  - Discharge home after PEG placement and tolerating TF to follow up with Dr. Mathias and rad/onc at [facility], poss 12/2 or 12/3         I spent >60  minutes in the professional and overall  care of this patient.    Assessment and plan discussed with attending Dr. Wu.       NIKUNJ Schroeder-CNP

## 2023-12-01 NOTE — PROGRESS NOTES
Physical Therapy    Physical Therapy Treatment    Patient Name: Valorie Montelongo  MRN: [id number]  Today's Date: 12/1/2023  Time Calculation  Start Time: 1017  Stop Time: 1049  Time Calculation (min): 32 min       Assessment/Plan   PT Assessment  Strengths: Support of Caregivers  End of Session Communication: Bedside nurse  Assessment Comment: Pt. tolerated treatment well today  End of Session Patient Position: Up in chair  PT Plan  Inpatient/Swing Bed or Outpatient: Inpatient  PT Plan  PT Plan: Skilled PT  PT Frequency: 3 times per week  PT Discharge Recommendations: Moderate intensity level of continued care  PT - OK to Discharge: Yes      General Visit Information:   PT  Visit  PT Received On: 12/01/23  General  Past Medical History Relevant to Rehab: 78 y.o. female with h/o HTN HLD NSTEMI 2/2 pneumothorax (11/25/21), lung adenocarinoma s/p L VATS, RAYRAY and LLL wedge resection (11/18/21), COPD (on 2L), oral cancer s/p resection and rads, GERD, papillary thyroid cancer s/p thyroidectomy 7/2021, hypothyroidism OAB, chronic pain syndrome, anxiety  Missed Visit: Yes  Missed Visit Reason:  (Attempted follow up however pt. off div at scan per CTA)  Family/Caregiver Present: Yes  Caregiver Feedback:  in room - very supportive  Prior to Session Communication: Bedside nurse  Patient Position Received: Bed, 3 rail up, Alarm on  General Comment: Dobhoff discontinued and currently has PEG tube.    Subjective   Precautions:  Precautions  Medical Precautions: Seizure precautions, Fall precautions  Vital Signs:  Vital Signs  Heart Rate: 62  Heart Rate Source: Monitor  SpO2: 95 %  Patient Position: Sitting    Objective   Pain:  Pain Assessment  Pain Assessment: 0-10  Pain Score:  (No complaints of pain.)  Cognition:  Cognition  Overall Cognitive Status:  (Improved)  Task Initiation: Initiates with cues  Postural Control:  Postural Control  Trunk Control: remaining midline without cues or assist.  Extremity/Trunk Assessments:                     Activity Tolerance:  Activity Tolerance  Endurance: Tolerates 10 - 20 min exercise with multiple rests  Treatments:  Therapeutic Exercise  Therapeutic Exercise Performed: Yes  Therapeutic Exercise Activity 1: Supine bilat le ex AP'S, SAQ'S, HS, AND ABD X 15 REPS.    Bed Mobility  Bed Mobility: Yes  Bed Mobility 1  Bed Mobility 1: Rolling right, Side lying right to sit  Level of Assistance 1: Minimum assistance  Bed Mobility Comments 1: Initial instruction on log roll and assist to maintain proper positioning.  Bed Mobility 2  Bed Mobility  2: Sitting to supine  Level of Assistance 2:  (Mod assist of 2)    Ambulation/Gait Training  Ambulation/Gait Training Performed: Yes  Ambulation/Gait Training 1  Surface 1:  (Pt. amb. 22' using a wh. walker and min assist. Pt. requires assist with walker management - cues to take a longer step with right le and repeated reminders on posture as pt. flexes forward.)  Transfers  Transfer: Yes  Transfer 1  Transfer From 1: Sit to, Stand to  Transfer to 1: Stand, Sit  Transfer Level of Assistance 1: Minimum assistance  Trials/Comments 1: Pt. having difficulty following instruction on hand placement and technique. Pt. required min/mod assist for boost and to steady.    Outcome Measures:  [facility] Basic Mobility  Turning from your back to your side while in a flat bed without using bedrails: A little  Moving from lying on your back to sitting on the side of a flat bed without using bedrails: A little  Moving to and from bed to chair (including a wheelchair): A little  Standing up from a chair using your arms (e.g. wheelchair or bedside chair): A little  To walk in hospital room: A little  Climbing 3-5 steps with railing: A lot  Basic Mobility - Total Score: 17    Education Documentation  Precautions, taught by Lisha Hines PTA at 12/1/2023 11:00 AM.  Learner: Patient  Readiness: Acceptance  Method: Explanation, Demonstration  Response: Needs Reinforcement    Body  Mechanics, taught by Lisha Hines PTA at 12/1/2023 11:00 AM.  Learner: Patient  Readiness: Acceptance  Method: Explanation, Demonstration  Response: Needs Reinforcement    Mobility Training, taught by Lisha Hines PTA at 12/1/2023 11:00 AM.  Learner: Patient  Readiness: Acceptance  Method: Explanation, Demonstration  Response: Needs Reinforcement    Education Comments  No comments found.        OP EDUCATION:       Encounter Problems       Encounter Problems (Active)       Mobility       STG - Patient will ambulate 20 ft with LRAD and mod assist (Progressing)       Start:  11/24/23    Expected End:  12/08/23               Transfers       STG - Transfer from bed to chair with LRAD and mod assist (Progressing)       Start:  11/24/23    Expected End:  12/08/23            STG - Patient will perform bed mobility with CGA (Progressing)       Start:  11/24/23    Expected End:  12/08/23

## 2023-12-01 NOTE — PROGRESS NOTES
PEG tube checked at bedside today. It was noted to be flushing well. It was mobile and external bumper remained at 4.5cm.      Please follow the post-PEG recommendations including:     - Okay to use the tube for everything (water, medications and tube feeds) 3 hours after returning to the floor.   - Change dressing once per day, dry dressing only, and change dressing on top of bumper only (not between bumper and skin).   - Clean site with soap and water daily and dry thoroughly.   - Make sure to flush the PEG with water BID to keep it patent  - If the PEG tube is accidentally removed at any time the patient should present immediately to the emergency department  - Contact the team if there are any post-placement concerns.     These instructions were communicated with the patient, the primary nurse and the patient's  at bedside.    GI will sign off

## 2023-12-01 NOTE — PROGRESS NOTES
Valorie Montelongo is a 77 y/o F w h/o metastatic lung cancer with hx of COPD  s/p VATS RAYRAY and LLL resection. Pt is on 2-3 L at baseline. She has failed a swallow evaluation d/t aspiration and now is s/p peg tube. Pt will require a hospital bed to keep HOB > 45 degrees to prevent aspiration and to easy with breathing.  Pt will need transfer wheelchair due to weakness and limited mobility. Pt needs wheelchair to maintain independence with care. Pt needs assistance with walking short distances.

## 2023-12-01 NOTE — PROGRESS NOTES
Occupational Therapy                 Therapy Communication Note    Patient Name: Valorie Montelongo  MRN: [id number]  Today's Date: 12/1/2023     Discipline: Occupational Therapy    Missed Visit Reason: Missed Visit Reason:  (radiation)    Missed Time: Attempt

## 2023-12-02 ENCOUNTER — HOME HEALTH ADMISSION (OUTPATIENT)
Dept: HOME HEALTH SERVICES | Facility: HOME HEALTH | Age: 78
End: 2023-12-02
Payer: COMMERCIAL

## 2023-12-02 VITALS
HEIGHT: 64 IN | DIASTOLIC BLOOD PRESSURE: 71 MMHG | HEART RATE: 63 BPM | RESPIRATION RATE: 16 BRPM | WEIGHT: 138 LBS | TEMPERATURE: 98.6 F | SYSTOLIC BLOOD PRESSURE: 133 MMHG | OXYGEN SATURATION: 96 % | BODY MASS INDEX: 23.56 KG/M2

## 2023-12-02 LAB
ALBUMIN SERPL BCP-MCNC: 3 G/DL (ref 3.4–5)
ALP SERPL-CCNC: 60 U/L (ref 33–136)
ALT SERPL W P-5'-P-CCNC: 24 U/L (ref 7–45)
ANION GAP SERPL CALC-SCNC: 9 MMOL/L (ref 10–20)
AST SERPL W P-5'-P-CCNC: 15 U/L (ref 9–39)
BASOPHILS # BLD AUTO: 0.01 X10*3/UL (ref 0–0.1)
BASOPHILS NFR BLD AUTO: 0.1 %
BILIRUB SERPL-MCNC: 0.5 MG/DL (ref 0–1.2)
BUN SERPL-MCNC: 26 MG/DL (ref 6–23)
CALCIUM SERPL-MCNC: 8.5 MG/DL (ref 8.6–10.6)
CHLORIDE SERPL-SCNC: 100 MMOL/L (ref 98–107)
CO2 SERPL-SCNC: 31 MMOL/L (ref 21–32)
CREAT SERPL-MCNC: 0.55 MG/DL (ref 0.5–1.05)
EOSINOPHIL # BLD AUTO: 0 X10*3/UL (ref 0–0.4)
EOSINOPHIL NFR BLD AUTO: 0 %
ERYTHROCYTE [DISTWIDTH] IN BLOOD BY AUTOMATED COUNT: 12 % (ref 11.5–14.5)
GFR SERPL CREATININE-BSD FRML MDRD: >90 ML/MIN/1.73M*2
GLUCOSE SERPL-MCNC: 146 MG/DL (ref 74–99)
HCT VFR BLD AUTO: 36.3 % (ref 36–46)
HGB BLD-MCNC: 12 G/DL (ref 12–16)
IMM GRANULOCYTES # BLD AUTO: 0.1 X10*3/UL (ref 0–0.5)
IMM GRANULOCYTES NFR BLD AUTO: 0.9 % (ref 0–0.9)
LYMPHOCYTES # BLD AUTO: 0.44 X10*3/UL (ref 0.8–3)
LYMPHOCYTES NFR BLD AUTO: 4 %
MCH RBC QN AUTO: 30.8 PG (ref 26–34)
MCHC RBC AUTO-ENTMCNC: 33.1 G/DL (ref 32–36)
MCV RBC AUTO: 93 FL (ref 80–100)
MONOCYTES # BLD AUTO: 1.11 X10*3/UL (ref 0.05–0.8)
MONOCYTES NFR BLD AUTO: 10.1 %
NEUTROPHILS # BLD AUTO: 9.34 X10*3/UL (ref 1.6–5.5)
NEUTROPHILS NFR BLD AUTO: 84.9 %
NRBC BLD-RTO: 0 /100 WBCS (ref 0–0)
PLATELET # BLD AUTO: 174 X10*3/UL (ref 150–450)
POTASSIUM SERPL-SCNC: 4.4 MMOL/L (ref 3.5–5.3)
PROT SERPL-MCNC: 4.8 G/DL (ref 6.4–8.2)
RBC # BLD AUTO: 3.89 X10*6/UL (ref 4–5.2)
SODIUM SERPL-SCNC: 136 MMOL/L (ref 136–145)
WBC # BLD AUTO: 11 X10*3/UL (ref 4.4–11.3)

## 2023-12-02 PROCEDURE — 2500000001 HC RX 250 WO HCPCS SELF ADMINISTERED DRUGS (ALT 637 FOR MEDICARE OP)

## 2023-12-02 PROCEDURE — 2500000004 HC RX 250 GENERAL PHARMACY W/ HCPCS (ALT 636 FOR OP/ED)

## 2023-12-02 PROCEDURE — 2500000004 HC RX 250 GENERAL PHARMACY W/ HCPCS (ALT 636 FOR OP/ED): Performed by: NURSE PRACTITIONER

## 2023-12-02 PROCEDURE — 80053 COMPREHEN METABOLIC PANEL: CPT

## 2023-12-02 PROCEDURE — 36415 COLL VENOUS BLD VENIPUNCTURE: CPT

## 2023-12-02 PROCEDURE — 2500000001 HC RX 250 WO HCPCS SELF ADMINISTERED DRUGS (ALT 637 FOR MEDICARE OP): Performed by: PHYSICIAN ASSISTANT

## 2023-12-02 PROCEDURE — 85025 COMPLETE CBC W/AUTO DIFF WBC: CPT

## 2023-12-02 PROCEDURE — 99239 HOSP IP/OBS DSCHRG MGMT >30: CPT

## 2023-12-02 PROCEDURE — C9113 INJ PANTOPRAZOLE SODIUM, VIA: HCPCS | Performed by: NURSE PRACTITIONER

## 2023-12-02 RX ADMIN — AMLODIPINE BESYLATE 5 MG: 5 TABLET ORAL at 08:49

## 2023-12-02 RX ADMIN — LEVOTHYROXINE SODIUM 137 MCG: 137 TABLET ORAL at 07:00

## 2023-12-02 RX ADMIN — DEXAMETHASONE INTENSOL 4 MG: 1 SOLUTION, CONCENTRATE ORAL at 08:54

## 2023-12-02 RX ADMIN — LEVETIRACETAM 500 MG: 500 SOLUTION ORAL at 08:54

## 2023-12-02 RX ADMIN — GABAPENTIN 300 MG: 250 SOLUTION ORAL at 06:00

## 2023-12-02 RX ADMIN — OXYBUTYNIN CHLORIDE 5 MG: 5 TABLET ORAL at 08:50

## 2023-12-02 RX ADMIN — Medication 50 MCG: at 08:50

## 2023-12-02 RX ADMIN — GABAPENTIN 300 MG: 250 SOLUTION ORAL at 13:40

## 2023-12-02 RX ADMIN — PANTOPRAZOLE SODIUM 40 MG: 40 INJECTION, POWDER, FOR SOLUTION INTRAVENOUS at 07:00

## 2023-12-02 RX ADMIN — ASPIRIN 81 MG CHEWABLE TABLET 81 MG: 81 TABLET CHEWABLE at 08:50

## 2023-12-02 RX ADMIN — LOSARTAN POTASSIUM 50 MG: 50 TABLET, FILM COATED ORAL at 08:50

## 2023-12-02 ASSESSMENT — COGNITIVE AND FUNCTIONAL STATUS - GENERAL
MOBILITY SCORE: 18
EATING MEALS: A LITTLE
TOILETING: A LITTLE
DAILY ACTIVITIY SCORE: 18
TURNING FROM BACK TO SIDE WHILE IN FLAT BAD: A LITTLE
DRESSING REGULAR LOWER BODY CLOTHING: A LITTLE
CLIMB 3 TO 5 STEPS WITH RAILING: A LITTLE
DRESSING REGULAR UPPER BODY CLOTHING: A LITTLE
WALKING IN HOSPITAL ROOM: A LITTLE
STANDING UP FROM CHAIR USING ARMS: A LITTLE
HELP NEEDED FOR BATHING: A LITTLE
PERSONAL GROOMING: A LITTLE
MOVING FROM LYING ON BACK TO SITTING ON SIDE OF FLAT BED WITH BEDRAILS: A LITTLE
MOVING TO AND FROM BED TO CHAIR: A LITTLE

## 2023-12-02 ASSESSMENT — PAIN SCALES - GENERAL: PAINLEVEL_OUTOF10: 0 - NO PAIN

## 2023-12-02 NOTE — TUMOR BOARD NOTE
CNS Tumor Board recommendations    Patient was presented by RENE Redd  at our CNS Tumor Board on 11/29/2023 which included representatives from Radiation oncology, Surgical oncology, Neuro-oncology, Pathology, Radiology, Research, Neurosurgery, Social Work (Neurosurgery).     Patient currently presents as Current patient with history of the following treatments: 77 yo F h/o metastastic nonsmall cell cancer, papillary thyroid cancer, salivary cancer s/p prior treatment. KPS poor. MRI shows multiple new brain metastases. Receives all her care at [facility]. MRI brain shows 2 lesions, largest L frontal lobe with edema/mass effect    The CNS Tumor Board tumor board considered available treatment options and made the following recommendations:  Hippocampal avoidance WBRT, trying to transfer back to [facility] as sean wants to continue her care there with Dr. Parth Turner of Rad Onc and Med Onc there.    Clinical Trial Status: n/a     National site-specific guidelines were discussed with respect to the case.

## 2023-12-02 NOTE — CARE PLAN
The patient's goals for the shift include Rest and comfort    The clinical goals for the shift include patient will tolerate bolus feeds this shift

## 2023-12-02 NOTE — CARE PLAN
The patient's goals for the shift include Rest and comfort    The clinical goals for the shift include No pain, No falls

## 2023-12-02 NOTE — PROGRESS NOTES
TCC met with patient at bedside and explained role and responsibility, Patient is discharging today. She is in agreement, Updated IMM completed. Patient was originally ordered Isosource, however [facility] does not have that in stock. Provided wrote script for [place] and [place] was updated. Patient to go home with 4 cartons of tube feed. [facility] will need a prior auth completed before they can send out the TF. Prior auth will not be started until Monday when the primary team returns. Discharge is not safe at this time due to unconfirmed supply start date from [facility]. Team updated.

## 2023-12-02 NOTE — DISCHARGE SUMMARY
Discharge Diagnosis  Metastatic adenocarcinoma (CMS/HCC)    Issues Requiring Follow-Up  Brain mets    Test Results Pending At Discharge  Pending Labs       No current pending labs.            Hospital Course  Valorie Montelongo is a 79 y/o F w h/o metastatic lung cancer (s/p VATS RAYRAY and LLL resection at  11/18/21 but now follows with oncology at [facility]), h/o papillary thyroid cancer (s/p thyroidectomy 7/2021), oral cancer (s/p resection and XRT), HTN, HLD, NSTEMI (11/25/21), COPD (on 2-3L at baseline), GERD, and anxiety who presented 11/23 as a transfer from [facility] for NSGY evaluation as CT head at OSH showed new metastatic brain lesions. Patient had been experiencing b/l LE weakness, gait changes and speech difficulties x 10 days, and have progressively gotten worse. MRI brain without contrast (11/23) showed multiple brain metastases with surrounding vasogenic edema. MRI whole spine (11/23) without evidence of spinal metastasis or cord compression. NSGY consulted on admit, rec no acute surgical intervention and rec to consult Rad Onc. Started on dexamethasone 4mg q6h + keppra 500mg BID per NSGY (plan to cont after discharge), also received MRI brain with and without 11/24. SLP consulted, failed MBSS. Of note, pt/family wanted transfer back to [facility], (11/27-11/29) there was multiple communications held for transfer but ultimately rejected by  11/29 and plan to cont [place] [facility]25 ENT was consulted for Dobbhoff placement d/t thyroid mass narrowing trachea. Rad Onc consulted 11/24, rec for WBRT 30Gy in 10 fractions, 11/30 s/p CT sim plan for OP RT to start within 7-10 days. Oncology consulted 11/24, will resume care OP with Dr. Mathias. GI consulted for PEG placement, 11/30 s/p PEG. 12/1 Pt at goal rate for at least x24 hr, transitioned to bolus feeds 12/2. DC removal of quintanilla 12/2, awaiting void (per pt, has intermittent urinary retention at home), voided 150cc prior to DC.     Per  [facility], Amy company does not have isosource 1.5 in stock and insurance/PA does not start until earliest Monday 12/4 d/t it being the weekend (provider d/w inpatient Nutrition and Jevity 1.5 is the equivalent and is an acceptable alternative for homegoing). Updated Jevity TF paper script and electronic script sent to [facility]. Paper script emailed to Rafaela Willis from [employer] who reviewed provider's signed script and will send over to be sent to insurance.     Provider had a long discussion with pt/pt  regarding the risk of not awaiting for PA approval from insurance till Monday as pt and  are adamant about discharge today. They were understanding of the fact that they will have enough TF bolus for 5 days but that [facility] will contact them with delivery once approval has been made by insurance company for more TF and that date us unknown as of now since it is the weekend.   Pt sent home with enough isosource 1.5 cartons for sufficient x5 days (~20 cartons) as was recommended by [facility] for a safe discharge till approval comes from insurance. Pt reports she is happy with this plan.     Hospital bed DME script also sent. DC home with [facility] who are available to start 12/3.     FUV Dr. Mathias requested for this week. Per d/w Rad/Onc (12/1) they will have an RT schedule available Mon 12/4 for [facility] and arrange Rad/Onc appts and call patient with schedule, pt aware and agreeable to this. [facility] sent for Decadron, Keppra, and Protonix and delivered to bedside.     Pertinent Physical Exam At Time of Discharge  Physical Exam  Constitutional:       Comments: Chronically ill appearance   HENT:      Head: Normocephalic.      Right Ear: External ear normal.      Left Ear: External ear normal.      Nose: Nose normal.      Mouth/Throat:      Mouth: Mucous membranes are dry.   Eyes:      Extraocular Movements: Extraocular movements intact.      Conjunctiva/sclera: Conjunctivae normal.   Cardiovascular:      Rate and  Rhythm: Normal rate and regular rhythm.   Pulmonary:      Effort: Pulmonary effort is normal.      Breath sounds: Normal breath sounds.   Abdominal:      General: Bowel sounds are normal.      Palpations: Abdomen is soft.      Comments: PEG site intact   Musculoskeletal:      Cervical back: Normal range of motion.      Comments: Generalized weakness   Skin:     General: Skin is warm and dry.   Neurological:      General: No focal deficit present.      Mental Status: She is alert and oriented to person, place, and time.   Psychiatric:         Mood and Affect: Mood normal.         Behavior: Behavior normal.       Home Medications     Medication List      START taking these medications     dexAMETHasone IntensoL 1 mg/mL solution; Generic drug: dexAMETHasone;   Give 4 mL (4 mg) by g-tube route 2 times a day for 10 days. discard   remainder   levETIRAcetam 100 mg/mL solution; Commonly known as: Keppra; Take 5 mL   (500 mg) by g-tube route 2 times a day.     CHANGE how you take these medications     * albuterol 90 mcg/actuation inhaler; What changed: Another medication   with the same name was added. Make sure you understand how and when to   take each.   * albuterol 2.5 mg /3 mL (0.083 %) nebulizer solution; Take 3 mL (2.5   mg) by nebulization every 6 hours if needed for wheezing.; What changed:   You were already taking a medication with the same name, and this   prescription was added. Make sure you understand how and when to take   each.   * Protonix 40 mg EC tablet; Generic drug: pantoprazole; What changed:   Another medication with the same name was added. Make sure you understand   how and when to take each.   * pantoprazole 40 mg EC tablet; Commonly known as: ProtoNix; Take 1   tablet (40 mg) by mouth once daily in the morning. Take before meals. Do   not crush, chew, or split.; What changed: You were already taking a   medication with the same name, and this prescription was added. Make sure   you understand how  and when to take each.  * This list has 4 medication(s) that are the same as other medications   prescribed for you. Read the directions carefully, and ask your doctor or   other care provider to review them with you.     CONTINUE taking these medications     amLODIPine 5 mg tablet; Commonly known as: Norvasc   aspirin 81 mg chewable tablet   Ativan 1 mg tablet; Generic drug: LORazepam   atorvastatin 10 mg tablet; Commonly known as: Lipitor   cholecalciferol 25 MCG (1000 UT) tablet; Commonly known as: Vitamin D-3   docusate sodium 100 mg capsule; Commonly known as: Colace   ICAPS AREDS ORAL   ipratropium 21 mcg (0.03 %) nasal spray; Commonly known as: Atrovent   irbesartan 150 mg tablet; Commonly known as: Avapro   levothyroxine 137 mcg tablet; Commonly known as: Synthroid, Levoxyl   Norco  mg tablet; Generic drug: HYDROcodone-acetaminophen   ondansetron 8 mg tablet; Commonly known as: Zofran   Toviaz 4 mg tablet extended release 24 hr; Generic drug: fesoterodine       Outpatient Follow-Up  No future appointments.    NIKUNJ Schroeder-CNP

## 2023-12-02 NOTE — CARE PLAN
Problem: Skin  Goal: Decreased wound size/increased tissue granulation at next dressing change  Outcome: Progressing  Goal: Participates in plan/prevention/treatment measures  Outcome: Progressing  Goal: Prevent/manage excess moisture  Outcome: Progressing  Goal: Prevent/minimize sheer/friction injuries  Outcome: Progressing  Goal: Promote/optimize nutrition  Outcome: Progressing  Goal: Promote skin healing  Outcome: Progressing   The patient's goals for the shift include Rest and comfort    The clinical goals for the shift include patient will receive peg tube    O

## 2023-12-02 NOTE — PROGRESS NOTES
[facility] sent script for TF and hospital bed. [facility] to supply transfer wheelchair. [facility] is out of hospital beds. Care Transitions to continue to follow for Home going needs. Nani Roach RN TCC

## 2023-12-02 NOTE — PROGRESS NOTES
Nutrition Assessment    Nutrition Note:      Asked by team to make alternate recommendations as homecare company does not have Isosource 1.5 formula.  Currently has an order for 275mls of Isosource 1.5 given 4 times daily.     [employer] does have Jevity 1.5--> this is an equivalent to Isosource 1.5.  Pt can do 275mls of Jevity 1.5 given 4 times daily.  This will meet nutrition needs.     Jevity/Isosource 1.5 both provide 1650kcals, 75grams protein daily.    If [employer] does not have either formula, any 1.5cal/ml formula will work--> Nutren 1.5, Osmolite 1.5, etc.    Estimated Needs:   Total Energy Estimated Needs (kCal): 1600 kCal   Method for Estimating Needs: MSJ = 1109 x 1.2-1.4    Total Protein Estimated Needs (g): 80 g   Method for Estimating Needs: 1.2-1.4gm/kg      Time Spent/Follow-up Reminder:   Follow Up  Time Spent (min): 15 minutes  Last Date of Nutrition Visit: 12/02/23  Nutrition Follow-Up Needed?: Dietitian to reassess per policy  Follow up Comment: brief note: needs alternate TF formula as homecare does not have Isosource

## 2023-12-05 ENCOUNTER — HOME CARE VISIT (OUTPATIENT)
Dept: HOME HEALTH SERVICES | Facility: HOME HEALTH | Age: 78
End: 2023-12-05
Payer: COMMERCIAL

## 2023-12-05 VITALS
DIASTOLIC BLOOD PRESSURE: 70 MMHG | RESPIRATION RATE: 18 BRPM | SYSTOLIC BLOOD PRESSURE: 130 MMHG | OXYGEN SATURATION: 100 % | TEMPERATURE: 97.5 F | HEART RATE: 66 BPM

## 2023-12-05 PROCEDURE — G0299 HHS/HOSPICE OF RN EA 15 MIN: HCPCS | Mod: HHH

## 2023-12-05 PROCEDURE — 1090000001 HH PPS REVENUE CREDIT

## 2023-12-05 PROCEDURE — 169592 NO-PAY CLAIM PROCEDURE

## 2023-12-05 PROCEDURE — 1090000002 HH PPS REVENUE DEBIT

## 2023-12-05 PROCEDURE — 0023 HH SOC

## 2023-12-05 PROCEDURE — 77014 CHG CT GUIDANCE RADIATION THERAPY FLDS PLACEMENT: CPT | Performed by: RADIOLOGY

## 2023-12-05 ASSESSMENT — ENCOUNTER SYMPTOMS
LOWEST PAIN SEVERITY IN PAST 24 HOURS: 0/10
HIGHEST PAIN SEVERITY IN PAST 24 HOURS: 0/10
MUSCLE WEAKNESS: 1
PAIN SEVERITY GOAL: 0/10

## 2023-12-05 ASSESSMENT — ACTIVITIES OF DAILY LIVING (ADL): ENTERING_EXITING_HOME: NEEDS ASSISTANCE

## 2023-12-05 NOTE — HOME HEALTH
PT is on tube feed. Pt was supposed to recieve tube feed solution yesterday but did not. Dietition contacted, advise to use boost untill tube feed solution arrives. Will notify MD

## 2023-12-06 PROCEDURE — 77014 CHG CT GUIDANCE RADIATION THERAPY FLDS PLACEMENT: CPT | Performed by: RADIOLOGY

## 2023-12-06 PROCEDURE — 1090000001 HH PPS REVENUE CREDIT

## 2023-12-06 PROCEDURE — 1090000002 HH PPS REVENUE DEBIT

## 2023-12-07 ENCOUNTER — HOME CARE VISIT (OUTPATIENT)
Dept: HOME HEALTH SERVICES | Facility: HOME HEALTH | Age: 78
End: 2023-12-07
Payer: COMMERCIAL

## 2023-12-07 PROCEDURE — G0270 MNT SUBS TX FOR CHANGE DX: HCPCS | Mod: HHH

## 2023-12-07 PROCEDURE — G0151 HHCP-SERV OF PT,EA 15 MIN: HCPCS | Mod: HHH

## 2023-12-07 PROCEDURE — 1090000002 HH PPS REVENUE DEBIT

## 2023-12-07 PROCEDURE — 1090000001 HH PPS REVENUE CREDIT

## 2023-12-07 PROCEDURE — 77014 CHG CT GUIDANCE RADIATION THERAPY FLDS PLACEMENT: CPT | Performed by: RADIOLOGY

## 2023-12-07 SDOH — HEALTH STABILITY: MENTAL HEALTH: NUTRITION HISTORY: PT HX WT LOSS SINCE CANCER DX 2021

## 2023-12-07 SDOH — HEALTH STABILITY: MENTAL HEALTH: SMOKING IN HOME: 0

## 2023-12-07 SDOH — ECONOMIC STABILITY: HOUSING INSECURITY: EVIDENCE OF SMOKING MATERIAL: 0

## 2023-12-07 ASSESSMENT — ENCOUNTER SYMPTOMS
HIGHEST PAIN SEVERITY IN PAST 24 HOURS: 8/10
PAIN: 1
PAIN LOCATION: BACK
LOWEST PAIN SEVERITY IN PAST 24 HOURS: 4/10
PAIN SEVERITY GOAL: 2/10
SUBJECTIVE PAIN PROGRESSION: UNCHANGED

## 2023-12-07 ASSESSMENT — ACTIVITIES OF DAILY LIVING (ADL)
AMBULATION ASSISTANCE ON FLAT SURFACES: 1
AMBULATION_DISTANCE/DURATION_TOLERATED: 25' X 2

## 2023-12-08 ENCOUNTER — HOME CARE VISIT (OUTPATIENT)
Dept: HOME HEALTH SERVICES | Facility: HOME HEALTH | Age: 78
End: 2023-12-08
Payer: COMMERCIAL

## 2023-12-08 PROCEDURE — 1090000001 HH PPS REVENUE CREDIT

## 2023-12-08 PROCEDURE — 1090000002 HH PPS REVENUE DEBIT

## 2023-12-09 ENCOUNTER — HOME CARE VISIT (OUTPATIENT)
Dept: HOME HEALTH SERVICES | Facility: HOME HEALTH | Age: 78
End: 2023-12-09
Payer: COMMERCIAL

## 2023-12-09 VITALS
TEMPERATURE: 97.8 F | DIASTOLIC BLOOD PRESSURE: 78 MMHG | BODY MASS INDEX: 23.17 KG/M2 | SYSTOLIC BLOOD PRESSURE: 138 MMHG | RESPIRATION RATE: 18 BRPM | WEIGHT: 135 LBS | HEART RATE: 71 BPM | OXYGEN SATURATION: 100 %

## 2023-12-09 PROCEDURE — 1090000002 HH PPS REVENUE DEBIT

## 2023-12-09 PROCEDURE — 1090000001 HH PPS REVENUE CREDIT

## 2023-12-09 PROCEDURE — G0300 HHS/HOSPICE OF LPN EA 15 MIN: HCPCS | Mod: HHH

## 2023-12-09 PROCEDURE — G0152 HHCP-SERV OF OT,EA 15 MIN: HCPCS | Mod: HHH

## 2023-12-09 SDOH — ECONOMIC STABILITY: HOUSING INSECURITY: EVIDENCE OF SMOKING MATERIAL: 1

## 2023-12-09 SDOH — HEALTH STABILITY: MENTAL HEALTH: SMOKING IN HOME: 1

## 2023-12-09 ASSESSMENT — ACTIVITIES OF DAILY LIVING (ADL)
CURRENT_FUNCTION: MODERATE ASSIST
LAUNDRY: DEPENDENT
TOILETING: INDEPENDENT
GROOMING ASSESSED: 1
PHYSICAL TRANSFERS ASSESSED: 1
DRESSING_LB_CURRENT_FUNCTION: MODERATE ASSIST
LAUNDRY ASSESSED: 1
PREPARING MEALS: DEPENDENT
BATHING_CURRENT_FUNCTION: MODERATE ASSIST
TOILETING: 1
DRESSING_UB_CURRENT_FUNCTION: INDEPENDENT
GROOMING_CURRENT_FUNCTION: INDEPENDENT
BATHING ASSESSED: 1

## 2023-12-09 ASSESSMENT — ENCOUNTER SYMPTOMS
APPETITE LEVEL: FAIR
DENIES PAIN: 1
LAST BOWEL MOVEMENT: 66817
PERSON REPORTING PAIN: PATIENT
HIGHEST PAIN SEVERITY IN PAST 24 HOURS: 7/10
COUGH: 1
SPUTUM PRODUCTION: 1
CHANGE IN APPETITE: DECREASED
LOWEST PAIN SEVERITY IN PAST 24 HOURS: 4/10
PAIN: 1

## 2023-12-09 NOTE — CASE COMMUNICATION
OT evaluation completed 12.9.23. Plan to see 1w5 to address showering, tub transfer and LE dressing, UE exercises. Patient would like to be able to help prep food for her family. Patient is supposed to be NPO, she is drinking liquids and soft food against medical advice.

## 2023-12-09 NOTE — HOME HEALTH
Patient seen with spouse for OT evaluatin visit. Patient was recently hospitalized with lung CA, found out she had mets to brain. She is going to be receiving radiation next week, and then will see her oncologist on 12.18.23. She states she is on a liquid diet, she has a feeding tube. She states she has a feeding tube but is taking liquid diet and soft foods AMA.     Lives in a bungalow home with 1 step to enter the back door, 3 steps up inside steps to main level. Stays on 1st floor with laundry, bedroom and full bathroom with a tub shower.    Medical history of lung CA, HTN, HLD, GERD, hypothyroidism.    Patient has a wheeled walker, suction cup grab bar, O2 (uses prn). Recommended a tub transfer bench.     Prior to hospitalization, was independent with ADLs and IADLs. She didn't drive. She didn't use an ambulatory device.     Barthel index-75 out of 100.     UE AROM WNL except shoulder flex/abd to approximately 90 degrees. UE strength-3M out of 5 shoulders, otherwise 4M out of 5. Patient in agreement with OT POC. Plan to see 1w5 to address showering, tub transfer, LE dressing and light meal prep. Patient states she would like to be able to prepare food for her family.

## 2023-12-10 PROCEDURE — 1090000002 HH PPS REVENUE DEBIT

## 2023-12-10 PROCEDURE — 1090000001 HH PPS REVENUE CREDIT

## 2023-12-11 ENCOUNTER — HOME CARE VISIT (OUTPATIENT)
Dept: HOME HEALTH SERVICES | Facility: HOME HEALTH | Age: 78
End: 2023-12-11
Payer: COMMERCIAL

## 2023-12-11 PROCEDURE — G0157 HHC PT ASSISTANT EA 15: HCPCS | Mod: HHH

## 2023-12-11 PROCEDURE — 77427 RADIATION TX MANAGEMENT X5: CPT | Performed by: RADIOLOGY

## 2023-12-11 PROCEDURE — 1090000001 HH PPS REVENUE CREDIT

## 2023-12-11 PROCEDURE — 77014 CHG CT GUIDANCE RADIATION THERAPY FLDS PLACEMENT: CPT | Performed by: RADIOLOGY

## 2023-12-11 PROCEDURE — 1090000002 HH PPS REVENUE DEBIT

## 2023-12-11 ASSESSMENT — ENCOUNTER SYMPTOMS
DENIES PAIN: 1
PERSON REPORTING PAIN: PATIENT

## 2023-12-12 PROCEDURE — 1090000001 HH PPS REVENUE CREDIT

## 2023-12-12 PROCEDURE — 1090000002 HH PPS REVENUE DEBIT

## 2023-12-13 ENCOUNTER — HOME CARE VISIT (OUTPATIENT)
Dept: HOME HEALTH SERVICES | Facility: HOME HEALTH | Age: 78
End: 2023-12-13
Payer: COMMERCIAL

## 2023-12-13 PROCEDURE — 1090000002 HH PPS REVENUE DEBIT

## 2023-12-13 PROCEDURE — 1090000001 HH PPS REVENUE CREDIT

## 2023-12-13 NOTE — HOME HEALTH
Arrived at patient's home for scheduled OT visit. She states she is in the hospital but is planning on signing herself out today. Notified other [facility] team members via secure chat.

## 2023-12-14 PROCEDURE — 77014 CHG CT GUIDANCE RADIATION THERAPY FLDS PLACEMENT: CPT | Performed by: RADIOLOGY

## 2023-12-14 PROCEDURE — 1090000002 HH PPS REVENUE DEBIT

## 2023-12-14 PROCEDURE — 1090000001 HH PPS REVENUE CREDIT

## 2023-12-15 ENCOUNTER — HOME CARE VISIT (OUTPATIENT)
Dept: HOME HEALTH SERVICES | Facility: HOME HEALTH | Age: 78
End: 2023-12-15
Payer: COMMERCIAL

## 2023-12-15 PROCEDURE — 77014 CHG CT GUIDANCE RADIATION THERAPY FLDS PLACEMENT: CPT | Performed by: RADIOLOGY

## 2023-12-15 PROCEDURE — 1090000001 HH PPS REVENUE CREDIT

## 2023-12-15 PROCEDURE — 1090000002 HH PPS REVENUE DEBIT

## 2023-12-16 PROCEDURE — 1090000002 HH PPS REVENUE DEBIT

## 2023-12-16 PROCEDURE — 1090000001 HH PPS REVENUE CREDIT

## 2023-12-17 PROCEDURE — 1090000002 HH PPS REVENUE DEBIT

## 2023-12-17 PROCEDURE — 1090000001 HH PPS REVENUE CREDIT

## 2023-12-18 PROCEDURE — 1090000002 HH PPS REVENUE DEBIT

## 2023-12-18 PROCEDURE — 1090000001 HH PPS REVENUE CREDIT

## 2023-12-19 ENCOUNTER — HOME CARE VISIT (OUTPATIENT)
Dept: HOME HEALTH SERVICES | Facility: HOME HEALTH | Age: 78
End: 2023-12-19
Payer: COMMERCIAL

## 2023-12-19 PROCEDURE — 1090000002 HH PPS REVENUE DEBIT

## 2023-12-19 PROCEDURE — G0180 MD CERTIFICATION HHA PATIENT: HCPCS | Performed by: INTERNAL MEDICINE

## 2023-12-19 PROCEDURE — 1090000001 HH PPS REVENUE CREDIT

## 2023-12-20 ENCOUNTER — HOME CARE VISIT (OUTPATIENT)
Dept: HOME HEALTH SERVICES | Facility: HOME HEALTH | Age: 78
End: 2023-12-20
Payer: COMMERCIAL

## 2023-12-20 ENCOUNTER — HOSPITAL ENCOUNTER (OUTPATIENT)
Dept: CARDIOLOGY | Facility: HOSPITAL | Age: 78
Discharge: HOME | End: 2023-12-20
Payer: COMMERCIAL

## 2023-12-20 VITALS
HEART RATE: 86 BPM | DIASTOLIC BLOOD PRESSURE: 62 MMHG | OXYGEN SATURATION: 100 % | TEMPERATURE: 97.2 F | RESPIRATION RATE: 18 BRPM | SYSTOLIC BLOOD PRESSURE: 118 MMHG

## 2023-12-20 PROCEDURE — G0300 HHS/HOSPICE OF LPN EA 15 MIN: HCPCS | Mod: HHH

## 2023-12-20 PROCEDURE — 93005 ELECTROCARDIOGRAM TRACING: CPT

## 2023-12-20 PROCEDURE — 1090000001 HH PPS REVENUE CREDIT

## 2023-12-20 PROCEDURE — 1090000002 HH PPS REVENUE DEBIT

## 2023-12-20 PROCEDURE — G0321 HH/HSPC RD PHONE VISIT: HCPCS | Mod: HHH

## 2023-12-20 SDOH — ECONOMIC STABILITY: HOUSING INSECURITY: EVIDENCE OF SMOKING MATERIAL: 0

## 2023-12-20 SDOH — HEALTH STABILITY: MENTAL HEALTH: SMOKING IN HOME: 0

## 2023-12-20 ASSESSMENT — ENCOUNTER SYMPTOMS
SPUTUM AMOUNT: SCANT
CHANGE IN APPETITE: UNCHANGED
COUGH CHARACTERISTICS: PRODUCTIVE
SPUTUM CONSISTENCY: THIN
DENIES PAIN: 1
LOWEST PAIN SEVERITY IN PAST 24 HOURS: 0/10
SUBJECTIVE PAIN PROGRESSION: UNCHANGED
COUGH: 1
PERSON REPORTING PAIN: PATIENT
LAST BOWEL MOVEMENT: 66828
APPETITE LEVEL: FAIR
HIGHEST PAIN SEVERITY IN PAST 24 HOURS: 1/10
MUSCLE WEAKNESS: 1
BOWEL PATTERN NORMAL: 1
STOOL FREQUENCY: DAILY
SPUTUM COLOR: CLEAR
SPUTUM PRODUCTION: 1
PAIN SEVERITY GOAL: 0/10

## 2023-12-20 ASSESSMENT — PAIN SCALES - PAIN ASSESSMENT IN ADVANCED DEMENTIA (PAINAD)
FACIALEXPRESSION: 0 - SMILING OR INEXPRESSIVE.
CONSOLABILITY: 0
BODYLANGUAGE: 0 - RELAXED.
NEGVOCALIZATION: 0
CONSOLABILITY: 0 - NO NEED TO CONSOLE.
BREATHING: 0
BODYLANGUAGE: 0
TOTALSCORE: 0
NEGVOCALIZATION: 0 - NONE.
FACIALEXPRESSION: 0

## 2023-12-20 NOTE — HOME HEALTH
Patient not taking any tube feeding at this time. Dr. Perkins reinforced patients g-tube with sutures on Thursday 12/14 and she stated she has had no leakage since then. Currently all nutrition is by mouth, supplementing with 2 nutritional shakes daily, her goal is 4 daily as recommended by physician. O2 at 2Lcontinuous via nasal cannula, c/o dryness and intermittent bleeding from nares. Spouse or daughter to contact [employer] that pr ovides concentrator to request canister for humidifying of O2 with distilled water.

## 2023-12-21 ENCOUNTER — APPOINTMENT (OUTPATIENT)
Dept: SURGERY | Facility: CLINIC | Age: 78
End: 2023-12-21
Payer: COMMERCIAL

## 2023-12-21 PROCEDURE — 1090000002 HH PPS REVENUE DEBIT

## 2023-12-21 PROCEDURE — 1090000001 HH PPS REVENUE CREDIT

## 2023-12-22 ENCOUNTER — APPOINTMENT (OUTPATIENT)
Dept: HOME HEALTH SERVICES | Facility: HOME HEALTH | Age: 78
End: 2023-12-22
Payer: COMMERCIAL

## 2023-12-22 PROCEDURE — 1090000001 HH PPS REVENUE CREDIT

## 2023-12-22 PROCEDURE — 1090000002 HH PPS REVENUE DEBIT

## 2023-12-23 ENCOUNTER — HOME CARE VISIT (OUTPATIENT)
Dept: HOME HEALTH SERVICES | Facility: HOME HEALTH | Age: 78
End: 2023-12-23
Payer: COMMERCIAL

## 2023-12-23 VITALS
OXYGEN SATURATION: 100 % | SYSTOLIC BLOOD PRESSURE: 110 MMHG | TEMPERATURE: 97.5 F | DIASTOLIC BLOOD PRESSURE: 60 MMHG | RESPIRATION RATE: 18 BRPM | HEART RATE: 82 BPM

## 2023-12-23 PROCEDURE — G0300 HHS/HOSPICE OF LPN EA 15 MIN: HCPCS | Mod: HHH

## 2023-12-23 PROCEDURE — 1090000001 HH PPS REVENUE CREDIT

## 2023-12-23 PROCEDURE — 1090000002 HH PPS REVENUE DEBIT

## 2023-12-23 SDOH — HEALTH STABILITY: MENTAL HEALTH: SMOKING IN HOME: 0

## 2023-12-23 SDOH — ECONOMIC STABILITY: HOUSING INSECURITY: EVIDENCE OF SMOKING MATERIAL: 0

## 2023-12-23 ASSESSMENT — ENCOUNTER SYMPTOMS
LAST BOWEL MOVEMENT: 66831
MUSCLE WEAKNESS: 1
STOOL FREQUENCY: LESS THAN DAILY
BOWEL PATTERN NORMAL: 1
LIMITED RANGE OF MOTION: 1
CHANGE IN APPETITE: UNCHANGED
SPUTUM CONSISTENCY: THIN
DENIES PAIN: 1
COUGH: 1
COUGH CHARACTERISTICS: PRODUCTIVE
SPUTUM PRODUCTION: 1
SPUTUM AMOUNT: SCANT
PAIN SEVERITY GOAL: 0/10
SPUTUM COLOR: CLEAR
DYSPNEA ON EXERTION: 1
SUBJECTIVE PAIN PROGRESSION: UNCHANGED
LOWEST PAIN SEVERITY IN PAST 24 HOURS: 0/10
FATIGUES EASILY: 1
HIGHEST PAIN SEVERITY IN PAST 24 HOURS: 2/10
APPETITE LEVEL: FAIR

## 2023-12-23 ASSESSMENT — PAIN SCALES - PAIN ASSESSMENT IN ADVANCED DEMENTIA (PAINAD)
NEGVOCALIZATION: 0
BODYLANGUAGE: 0
FACIALEXPRESSION: 0 - SMILING OR INEXPRESSIVE.
NEGVOCALIZATION: 0 - NONE.
CONSOLABILITY: 0
TOTALSCORE: 0
BODYLANGUAGE: 0 - RELAXED.
CONSOLABILITY: 0 - NO NEED TO CONSOLE.
BREATHING: 0
FACIALEXPRESSION: 0

## 2023-12-24 PROCEDURE — 1090000002 HH PPS REVENUE DEBIT

## 2023-12-24 PROCEDURE — 1090000001 HH PPS REVENUE CREDIT

## 2023-12-25 PROCEDURE — 1090000001 HH PPS REVENUE CREDIT

## 2023-12-25 PROCEDURE — 1090000002 HH PPS REVENUE DEBIT

## 2023-12-26 ENCOUNTER — APPOINTMENT (OUTPATIENT)
Dept: HOME HEALTH SERVICES | Facility: HOME HEALTH | Age: 78
End: 2023-12-26
Payer: COMMERCIAL

## 2023-12-26 ENCOUNTER — HOME CARE VISIT (OUTPATIENT)
Dept: HOME HEALTH SERVICES | Facility: HOME HEALTH | Age: 78
End: 2023-12-26
Payer: COMMERCIAL

## 2023-12-26 VITALS
OXYGEN SATURATION: 97 % | RESPIRATION RATE: 18 BRPM | HEART RATE: 81 BPM | DIASTOLIC BLOOD PRESSURE: 60 MMHG | TEMPERATURE: 97.5 F | SYSTOLIC BLOOD PRESSURE: 116 MMHG

## 2023-12-26 PROCEDURE — 1090000001 HH PPS REVENUE CREDIT

## 2023-12-26 PROCEDURE — G0299 HHS/HOSPICE OF RN EA 15 MIN: HCPCS | Mod: HHH

## 2023-12-26 PROCEDURE — 1090000002 HH PPS REVENUE DEBIT

## 2023-12-26 SDOH — ECONOMIC STABILITY: HOUSING INSECURITY: EVIDENCE OF SMOKING MATERIAL: 0

## 2023-12-26 SDOH — HEALTH STABILITY: MENTAL HEALTH: SMOKING IN HOME: 1

## 2023-12-26 ASSESSMENT — PAIN SCALES - PAIN ASSESSMENT IN ADVANCED DEMENTIA (PAINAD): BREATHING: 0

## 2023-12-26 ASSESSMENT — ENCOUNTER SYMPTOMS
MUSCLE WEAKNESS: 1
APPETITE LEVEL: FAIR
HIGHEST PAIN SEVERITY IN PAST 24 HOURS: 0/10
PAIN SEVERITY GOAL: 0/10
LOWEST PAIN SEVERITY IN PAST 24 HOURS: 0/10

## 2023-12-27 LAB
ATRIAL RATE: 57 BPM
P AXIS: 74 DEGREES
P OFFSET: 192 MS
P ONSET: 138 MS
PR INTERVAL: 168 MS
Q ONSET: 222 MS
QRS COUNT: 9 BEATS
QRS DURATION: 82 MS
QT INTERVAL: 436 MS
QTC CALCULATION(BAZETT): 424 MS
QTC FREDERICIA: 428 MS
R AXIS: 97 DEGREES
T AXIS: 20 DEGREES
T OFFSET: 440 MS
VENTRICULAR RATE: 57 BPM

## 2023-12-27 PROCEDURE — 1090000001 HH PPS REVENUE CREDIT

## 2023-12-27 PROCEDURE — 1090000002 HH PPS REVENUE DEBIT

## 2023-12-28 ENCOUNTER — HOME CARE VISIT (OUTPATIENT)
Dept: HOME HEALTH SERVICES | Facility: HOME HEALTH | Age: 78
End: 2023-12-28
Payer: COMMERCIAL

## 2023-12-28 PROCEDURE — G0152 HHCP-SERV OF OT,EA 15 MIN: HCPCS | Mod: HHH

## 2023-12-28 PROCEDURE — 1090000001 HH PPS REVENUE CREDIT

## 2023-12-28 PROCEDURE — 1090000002 HH PPS REVENUE DEBIT

## 2023-12-28 SDOH — ECONOMIC STABILITY: HOUSING INSECURITY: HOME SAFETY: REVIEWED O2 SAFETY PAGES IN HHC BOOKLET. PATIENT VERBALIZES UNDERSTANDING NOT TO SMOKE WITH O2.

## 2023-12-28 SDOH — HEALTH STABILITY: MENTAL HEALTH: SMOKING IN HOME: 1

## 2023-12-28 SDOH — ECONOMIC STABILITY: HOUSING INSECURITY: EVIDENCE OF SMOKING MATERIAL: 1

## 2023-12-28 ASSESSMENT — ACTIVITIES OF DAILY LIVING (ADL): OASIS_M1830: 03

## 2023-12-28 ASSESSMENT — ENCOUNTER SYMPTOMS
PAIN: 1
HIGHEST PAIN SEVERITY IN PAST 24 HOURS: 10/10
LOWEST PAIN SEVERITY IN PAST 24 HOURS: 0/10
PERSON REPORTING PAIN: PATIENT

## 2023-12-28 ASSESSMENT — PAIN SCALES - PAIN ASSESSMENT IN ADVANCED DEMENTIA (PAINAD): BREATHING: 0

## 2023-12-28 NOTE — HOME HEALTH
Patient seen with spouse for OT visit. She states her Peg tube was sown up, was having leaking. States she is now eating all food PO. Radiation is completed. States she may get treatments for cancer next month, probably via pill (not chemo). Patient states she is now walking without a walker.     Plan for next visit-planned DC visit. ADL training-showering, review tub transfer technique, IADL training-light meal prep, review home safety. Initiate UE HEP. Planned DC visit.

## 2023-12-29 ENCOUNTER — APPOINTMENT (OUTPATIENT)
Dept: HOME HEALTH SERVICES | Facility: HOME HEALTH | Age: 78
End: 2023-12-29
Payer: COMMERCIAL

## 2023-12-29 PROCEDURE — 1090000001 HH PPS REVENUE CREDIT

## 2023-12-29 PROCEDURE — 1090000002 HH PPS REVENUE DEBIT

## 2023-12-30 ENCOUNTER — HOME CARE VISIT (OUTPATIENT)
Dept: HOME HEALTH SERVICES | Facility: HOME HEALTH | Age: 78
End: 2023-12-30
Payer: COMMERCIAL

## 2023-12-30 VITALS
RESPIRATION RATE: 22 BRPM | DIASTOLIC BLOOD PRESSURE: 84 MMHG | TEMPERATURE: 97.2 F | HEART RATE: 77 BPM | SYSTOLIC BLOOD PRESSURE: 148 MMHG | OXYGEN SATURATION: 99 %

## 2023-12-30 PROCEDURE — G0300 HHS/HOSPICE OF LPN EA 15 MIN: HCPCS | Mod: HHH

## 2023-12-30 PROCEDURE — 1090000001 HH PPS REVENUE CREDIT

## 2023-12-30 PROCEDURE — 1090000002 HH PPS REVENUE DEBIT

## 2023-12-30 SDOH — ECONOMIC STABILITY: GENERAL

## 2023-12-30 ASSESSMENT — ENCOUNTER SYMPTOMS
PAIN LOCATION - PAIN FREQUENCY: CONSTANT
PAIN LOCATION: BACK
LOWEST PAIN SEVERITY IN PAST 24 HOURS: 4/10
LAST BOWEL MOVEMENT: 66838
PAIN LOCATION - PAIN SEVERITY: 5/10
PAIN: 1
SUBJECTIVE PAIN PROGRESSION: UNCHANGED
APPETITE LEVEL: FAIR
PAIN LOCATION - PAIN QUALITY: DULL
HIGHEST PAIN SEVERITY IN PAST 24 HOURS: 5/10
CHANGE IN APPETITE: UNCHANGED
PAIN SEVERITY GOAL: 4/10

## 2023-12-30 ASSESSMENT — ACTIVITIES OF DAILY LIVING (ADL): MONEY MANAGEMENT (EXPENSES/BILLS): NEEDS ASSISTANCE

## 2023-12-31 PROCEDURE — 1090000001 HH PPS REVENUE CREDIT

## 2023-12-31 PROCEDURE — 1090000002 HH PPS REVENUE DEBIT

## 2024-01-01 PROCEDURE — 1090000002 HH PPS REVENUE DEBIT

## 2024-01-01 PROCEDURE — 1090000001 HH PPS REVENUE CREDIT

## 2024-01-02 ENCOUNTER — APPOINTMENT (OUTPATIENT)
Dept: HOME HEALTH SERVICES | Facility: HOME HEALTH | Age: 79
End: 2024-01-02
Payer: COMMERCIAL

## 2024-01-02 PROCEDURE — 1090000002 HH PPS REVENUE DEBIT

## 2024-01-02 PROCEDURE — 1090000001 HH PPS REVENUE CREDIT

## 2024-01-03 ENCOUNTER — HOME CARE VISIT (OUTPATIENT)
Dept: HOME HEALTH SERVICES | Facility: HOME HEALTH | Age: 79
End: 2024-01-03
Payer: COMMERCIAL

## 2024-01-03 VITALS
TEMPERATURE: 98 F | DIASTOLIC BLOOD PRESSURE: 78 MMHG | OXYGEN SATURATION: 98 % | SYSTOLIC BLOOD PRESSURE: 130 MMHG | HEART RATE: 85 BPM | RESPIRATION RATE: 18 BRPM

## 2024-01-03 PROCEDURE — 1090000002 HH PPS REVENUE DEBIT

## 2024-01-03 PROCEDURE — G0152 HHCP-SERV OF OT,EA 15 MIN: HCPCS | Mod: HHH

## 2024-01-03 PROCEDURE — 1090000001 HH PPS REVENUE CREDIT

## 2024-01-03 PROCEDURE — G0300 HHS/HOSPICE OF LPN EA 15 MIN: HCPCS | Mod: HHH

## 2024-01-03 SDOH — ECONOMIC STABILITY: HOUSING INSECURITY: EVIDENCE OF SMOKING MATERIAL: 1

## 2024-01-03 SDOH — HEALTH STABILITY: MENTAL HEALTH: SMOKING IN HOME: 1

## 2024-01-03 SDOH — ECONOMIC STABILITY: HOUSING INSECURITY: HOME SAFETY: PATIENT DOESN'T SMOKE IN SAME ROOM AS O2. USES O2

## 2024-01-03 SDOH — ECONOMIC STABILITY: GENERAL

## 2024-01-03 ASSESSMENT — ENCOUNTER SYMPTOMS
PAIN: 1
CHANGE IN APPETITE: UNCHANGED
DENIES PAIN: 1
PERSON REPORTING PAIN: PATIENT
HIGHEST PAIN SEVERITY IN PAST 24 HOURS: 5/10
LAST BOWEL MOVEMENT: 66842
LOWEST PAIN SEVERITY IN PAST 24 HOURS: 0/10

## 2024-01-03 ASSESSMENT — ACTIVITIES OF DAILY LIVING (ADL): MONEY MANAGEMENT (EXPENSES/BILLS): INDEPENDENT

## 2024-01-03 NOTE — HOME HEALTH
Patient seen with spouse for OT DC visit. Completed emergency preparedness teaching and evacuation plan, O2 safety and fire safety. Patient stated she is having leaking around her G-tube, I texted SN services. Jese Russell LPN, is going to come after my visit to do a prn visit to do dressing change. NOMNC not signed as I am not sure if nursing is going to need to see patient longer.     Barthel index-95 out of 100.

## 2024-01-03 NOTE — HOME HEALTH
prn added to assess bandage on peg tube. peg tube bandage was hard and stuck to tube. wet down with slaine and carefully removed. cleaned peg tube site. showed pt how to clean and turn tube. and redressed. pt tolerated. surrounding skin around tube is slightly red.

## 2024-01-04 ENCOUNTER — HOME CARE VISIT (OUTPATIENT)
Dept: HOME HEALTH SERVICES | Facility: HOME HEALTH | Age: 79
End: 2024-01-04
Payer: COMMERCIAL

## 2024-01-04 PROCEDURE — 1090000001 HH PPS REVENUE CREDIT

## 2024-01-04 PROCEDURE — 1090000002 HH PPS REVENUE DEBIT

## 2024-01-04 PROCEDURE — G0151 HHCP-SERV OF PT,EA 15 MIN: HCPCS | Mod: HHH

## 2024-01-04 PROCEDURE — 0023 HH SOC

## 2024-01-04 SDOH — HEALTH STABILITY: MENTAL HEALTH: SMOKING IN HOME: 1

## 2024-01-04 SDOH — ECONOMIC STABILITY: HOUSING INSECURITY: OPEN FLAME PRESENT: 1

## 2024-01-04 SDOH — ECONOMIC STABILITY: HOUSING INSECURITY: EVIDENCE OF SMOKING MATERIAL: 1

## 2024-01-04 ASSESSMENT — ENCOUNTER SYMPTOMS
PAIN LOCATION: BACK
PAIN LOCATION: RIGHT LEG
PAIN: 1
LOWEST PAIN SEVERITY IN PAST 24 HOURS: 4/10
HIGHEST PAIN SEVERITY IN PAST 24 HOURS: 10/10
PAIN SEVERITY GOAL: 4/10
PAIN LOCATION: LEFT LEG
SUBJECTIVE PAIN PROGRESSION: UNCHANGED
PERSON REPORTING PAIN: PATIENT

## 2024-01-05 PROCEDURE — 1090000001 HH PPS REVENUE CREDIT

## 2024-01-05 PROCEDURE — 1090000002 HH PPS REVENUE DEBIT

## 2024-01-06 PROCEDURE — 1090000001 HH PPS REVENUE CREDIT

## 2024-01-06 PROCEDURE — 1090000002 HH PPS REVENUE DEBIT

## 2024-01-07 PROCEDURE — 1090000002 HH PPS REVENUE DEBIT

## 2024-01-07 PROCEDURE — 1090000001 HH PPS REVENUE CREDIT

## 2024-01-08 PROCEDURE — 1090000001 HH PPS REVENUE CREDIT

## 2024-01-08 PROCEDURE — 1090000002 HH PPS REVENUE DEBIT

## 2024-01-09 PROCEDURE — 1090000002 HH PPS REVENUE DEBIT

## 2024-01-09 PROCEDURE — 1090000001 HH PPS REVENUE CREDIT

## 2024-01-10 ENCOUNTER — HOME CARE VISIT (OUTPATIENT)
Dept: HOME HEALTH SERVICES | Facility: HOME HEALTH | Age: 79
End: 2024-01-10
Payer: COMMERCIAL

## 2024-01-10 VITALS
TEMPERATURE: 97.4 F | DIASTOLIC BLOOD PRESSURE: 62 MMHG | SYSTOLIC BLOOD PRESSURE: 118 MMHG | OXYGEN SATURATION: 97 % | HEART RATE: 76 BPM | RESPIRATION RATE: 18 BRPM

## 2024-01-10 VITALS — DIASTOLIC BLOOD PRESSURE: 62 MMHG | SYSTOLIC BLOOD PRESSURE: 140 MMHG

## 2024-01-10 PROCEDURE — G0157 HHC PT ASSISTANT EA 15: HCPCS | Mod: HHH

## 2024-01-10 PROCEDURE — G0300 HHS/HOSPICE OF LPN EA 15 MIN: HCPCS | Mod: HHH

## 2024-01-10 PROCEDURE — 1090000001 HH PPS REVENUE CREDIT

## 2024-01-10 PROCEDURE — G0321 HH/HSPC RD PHONE VISIT: HCPCS | Mod: HHH

## 2024-01-10 PROCEDURE — 1090000002 HH PPS REVENUE DEBIT

## 2024-01-10 SDOH — HEALTH STABILITY: MENTAL HEALTH: SMOKING IN HOME: 0

## 2024-01-10 SDOH — ECONOMIC STABILITY: HOUSING INSECURITY: EVIDENCE OF SMOKING MATERIAL: 0

## 2024-01-10 ASSESSMENT — PAIN SCALES - PAIN ASSESSMENT IN ADVANCED DEMENTIA (PAINAD)
NEGVOCALIZATION: 0 - NONE.
CONSOLABILITY: 0
FACIALEXPRESSION: 0 - SMILING OR INEXPRESSIVE.
BREATHING: 0
NEGVOCALIZATION: 0
BODYLANGUAGE: 0 - RELAXED.
BODYLANGUAGE: 0
FACIALEXPRESSION: 0
TOTALSCORE: 0
CONSOLABILITY: 0 - NO NEED TO CONSOLE.

## 2024-01-10 ASSESSMENT — ENCOUNTER SYMPTOMS
STOOL FREQUENCY: LESS THAN DAILY
DYSPNEA ON EXERTION: 1
DENIES PAIN: 1
SUBJECTIVE PAIN PROGRESSION: UNCHANGED
PAIN: 1
PERSON REPORTING PAIN: PATIENT
HIGHEST PAIN SEVERITY IN PAST 24 HOURS: 8/10
BOWEL PATTERN NORMAL: 1
PAIN LOCATION: BACK
MUSCLE WEAKNESS: 1
LOWEST PAIN SEVERITY IN PAST 24 HOURS: 5/10
FATIGUES EASILY: 1
HIGHEST PAIN SEVERITY IN PAST 24 HOURS: 3/10
CHANGE IN APPETITE: UNCHANGED
PERSON REPORTING PAIN: PATIENT
APPETITE LEVEL: FAIR
PAIN SEVERITY GOAL: 0/10

## 2024-01-11 PROCEDURE — 1090000002 HH PPS REVENUE DEBIT

## 2024-01-11 PROCEDURE — 1090000001 HH PPS REVENUE CREDIT

## 2024-01-12 ENCOUNTER — APPOINTMENT (OUTPATIENT)
Dept: HOME HEALTH SERVICES | Facility: HOME HEALTH | Age: 79
End: 2024-01-12
Payer: COMMERCIAL

## 2024-01-12 PROCEDURE — 1090000001 HH PPS REVENUE CREDIT

## 2024-01-12 PROCEDURE — 1090000002 HH PPS REVENUE DEBIT

## 2024-01-13 PROCEDURE — 1090000001 HH PPS REVENUE CREDIT

## 2024-01-13 PROCEDURE — 1090000002 HH PPS REVENUE DEBIT

## 2024-01-14 PROCEDURE — 1090000001 HH PPS REVENUE CREDIT

## 2024-01-14 PROCEDURE — 1090000002 HH PPS REVENUE DEBIT

## 2024-01-15 ENCOUNTER — APPOINTMENT (OUTPATIENT)
Dept: HOME HEALTH SERVICES | Facility: HOME HEALTH | Age: 79
End: 2024-01-15
Payer: COMMERCIAL

## 2024-01-15 PROCEDURE — 1090000001 HH PPS REVENUE CREDIT

## 2024-01-15 PROCEDURE — 1090000002 HH PPS REVENUE DEBIT

## 2024-01-16 PROCEDURE — 1090000001 HH PPS REVENUE CREDIT

## 2024-01-16 PROCEDURE — 1090000002 HH PPS REVENUE DEBIT

## 2024-01-17 ENCOUNTER — HOME CARE VISIT (OUTPATIENT)
Dept: HOME HEALTH SERVICES | Facility: HOME HEALTH | Age: 79
End: 2024-01-17
Payer: COMMERCIAL

## 2024-01-17 ENCOUNTER — APPOINTMENT (OUTPATIENT)
Dept: HOME HEALTH SERVICES | Facility: HOME HEALTH | Age: 79
End: 2024-01-17
Payer: COMMERCIAL

## 2024-01-17 VITALS
TEMPERATURE: 97.5 F | SYSTOLIC BLOOD PRESSURE: 110 MMHG | DIASTOLIC BLOOD PRESSURE: 60 MMHG | RESPIRATION RATE: 18 BRPM | OXYGEN SATURATION: 98 % | HEART RATE: 72 BPM

## 2024-01-17 PROCEDURE — 1090000002 HH PPS REVENUE DEBIT

## 2024-01-17 PROCEDURE — G0300 HHS/HOSPICE OF LPN EA 15 MIN: HCPCS | Mod: HHH

## 2024-01-17 PROCEDURE — 1090000001 HH PPS REVENUE CREDIT

## 2024-01-17 SDOH — ECONOMIC STABILITY: HOUSING INSECURITY: EVIDENCE OF SMOKING MATERIAL: 0

## 2024-01-17 SDOH — HEALTH STABILITY: MENTAL HEALTH: SMOKING IN HOME: 0

## 2024-01-17 ASSESSMENT — ENCOUNTER SYMPTOMS
LOWEST PAIN SEVERITY IN PAST 24 HOURS: 0/10
PAIN: 1
FATIGUES EASILY: 1
PERSON REPORTING PAIN: PATIENT
HIGHEST PAIN SEVERITY IN PAST 24 HOURS: 5/10
SUBJECTIVE PAIN PROGRESSION: UNCHANGED
PAIN SEVERITY GOAL: 0/10
PAIN LOCATION: BACK
DYSPNEA ON EXERTION: 1
APPETITE LEVEL: FAIR
MUSCLE WEAKNESS: 1
CHANGE IN APPETITE: UNCHANGED

## 2024-01-17 ASSESSMENT — PAIN SCALES - PAIN ASSESSMENT IN ADVANCED DEMENTIA (PAINAD)
NEGVOCALIZATION: 0 - NONE.
CONSOLABILITY: 0 - NO NEED TO CONSOLE.
CONSOLABILITY: 0
BODYLANGUAGE: 0 - RELAXED.
NEGVOCALIZATION: 0
FACIALEXPRESSION: 0
FACIALEXPRESSION: 0 - SMILING OR INEXPRESSIVE.
TOTALSCORE: 0
BREATHING: 0
BODYLANGUAGE: 0

## 2024-01-18 PROCEDURE — 1090000001 HH PPS REVENUE CREDIT

## 2024-01-18 PROCEDURE — 1090000002 HH PPS REVENUE DEBIT

## 2024-01-19 PROCEDURE — 1090000001 HH PPS REVENUE CREDIT

## 2024-01-19 PROCEDURE — 1090000002 HH PPS REVENUE DEBIT

## 2024-01-20 PROCEDURE — 1090000002 HH PPS REVENUE DEBIT

## 2024-01-20 PROCEDURE — 1090000001 HH PPS REVENUE CREDIT

## 2024-01-21 PROCEDURE — 1090000002 HH PPS REVENUE DEBIT

## 2024-01-21 PROCEDURE — 1090000001 HH PPS REVENUE CREDIT

## 2024-01-22 PROCEDURE — 1090000001 HH PPS REVENUE CREDIT

## 2024-01-22 PROCEDURE — 1090000002 HH PPS REVENUE DEBIT

## 2024-01-23 ENCOUNTER — HOME CARE VISIT (OUTPATIENT)
Dept: HOME HEALTH SERVICES | Facility: HOME HEALTH | Age: 79
End: 2024-01-23
Payer: COMMERCIAL

## 2024-01-23 VITALS
RESPIRATION RATE: 18 BRPM | OXYGEN SATURATION: 98 % | TEMPERATURE: 97.1 F | SYSTOLIC BLOOD PRESSURE: 128 MMHG | HEART RATE: 80 BPM | DIASTOLIC BLOOD PRESSURE: 74 MMHG

## 2024-01-23 PROCEDURE — G0300 HHS/HOSPICE OF LPN EA 15 MIN: HCPCS | Mod: HHH

## 2024-01-23 PROCEDURE — 1090000002 HH PPS REVENUE DEBIT

## 2024-01-23 PROCEDURE — 1090000001 HH PPS REVENUE CREDIT

## 2024-01-23 SDOH — ECONOMIC STABILITY: HOUSING INSECURITY: EVIDENCE OF SMOKING MATERIAL: 0

## 2024-01-23 SDOH — HEALTH STABILITY: MENTAL HEALTH: SMOKING IN HOME: 0

## 2024-01-23 ASSESSMENT — ENCOUNTER SYMPTOMS
FATIGUE: 1
FATIGUES EASILY: 1
CHANGE IN APPETITE: UNCHANGED
PAIN LOCATION - PAIN SEVERITY: 6/10
DYSPNEA ON EXERTION: 1
SUBJECTIVE PAIN PROGRESSION: UNCHANGED
PERSON REPORTING PAIN: PATIENT
HIGHEST PAIN SEVERITY IN PAST 24 HOURS: 6/10
MUSCLE WEAKNESS: 1
PAIN LOCATION: BACK
PAIN: 1
LOWEST PAIN SEVERITY IN PAST 24 HOURS: 2/10
PAIN SEVERITY GOAL: 0/10
APPETITE LEVEL: POOR

## 2024-01-23 ASSESSMENT — PAIN SCALES - PAIN ASSESSMENT IN ADVANCED DEMENTIA (PAINAD)
FACIALEXPRESSION: 0
BODYLANGUAGE: 0 - RELAXED.
CONSOLABILITY: 0
BREATHING: 0
NEGVOCALIZATION: 0
CONSOLABILITY: 0 - NO NEED TO CONSOLE.
BODYLANGUAGE: 0
FACIALEXPRESSION: 0 - SMILING OR INEXPRESSIVE.
NEGVOCALIZATION: 0 - NONE.
TOTALSCORE: 0

## 2024-01-24 ENCOUNTER — HOME CARE VISIT (OUTPATIENT)
Dept: HOME HEALTH SERVICES | Facility: HOME HEALTH | Age: 79
End: 2024-01-24
Payer: COMMERCIAL

## 2024-01-24 PROCEDURE — 1090000001 HH PPS REVENUE CREDIT

## 2024-01-24 PROCEDURE — G0151 HHCP-SERV OF PT,EA 15 MIN: HCPCS | Mod: HHH

## 2024-01-24 PROCEDURE — 1090000002 HH PPS REVENUE DEBIT

## 2024-01-24 SDOH — HEALTH STABILITY: PHYSICAL HEALTH
EXERCISE COMMENTS: SEATED EXC COMPLETED 5-10 REPS INCLUDING MARCHING, LAQ, ANKLE PUMPS.    SUPINE EXC COMPLETED 5-10 REPS INCLUDING ANKLE PUMPS, SLR.

## 2024-01-24 ASSESSMENT — ENCOUNTER SYMPTOMS
PAIN SEVERITY GOAL: 4/10
HIGHEST PAIN SEVERITY IN PAST 24 HOURS: 9/10
SUBJECTIVE PAIN PROGRESSION: UNCHANGED
LOWEST PAIN SEVERITY IN PAST 24 HOURS: 4/10
PAIN: 1
PAIN LOCATION: BACK

## 2024-01-25 PROCEDURE — 1090000002 HH PPS REVENUE DEBIT

## 2024-01-25 PROCEDURE — 1090000001 HH PPS REVENUE CREDIT

## 2024-01-26 PROCEDURE — 1090000001 HH PPS REVENUE CREDIT

## 2024-01-26 PROCEDURE — 1090000002 HH PPS REVENUE DEBIT

## 2024-01-27 PROCEDURE — 1090000001 HH PPS REVENUE CREDIT

## 2024-01-27 PROCEDURE — 1090000002 HH PPS REVENUE DEBIT

## 2024-01-28 PROCEDURE — 1090000002 HH PPS REVENUE DEBIT

## 2024-01-28 PROCEDURE — 1090000001 HH PPS REVENUE CREDIT

## 2024-01-29 ENCOUNTER — HOME CARE VISIT (OUTPATIENT)
Dept: HOME HEALTH SERVICES | Facility: HOME HEALTH | Age: 79
End: 2024-01-29
Payer: COMMERCIAL

## 2024-01-29 PROCEDURE — 1090000001 HH PPS REVENUE CREDIT

## 2024-01-29 PROCEDURE — G0321 HH/HSPC RD PHONE VISIT: HCPCS | Mod: HHH

## 2024-01-29 PROCEDURE — 1090000002 HH PPS REVENUE DEBIT

## 2024-01-30 ENCOUNTER — HOME CARE VISIT (OUTPATIENT)
Dept: HOME HEALTH SERVICES | Facility: HOME HEALTH | Age: 79
End: 2024-01-30
Payer: COMMERCIAL

## 2024-01-30 VITALS
DIASTOLIC BLOOD PRESSURE: 72 MMHG | SYSTOLIC BLOOD PRESSURE: 126 MMHG | WEIGHT: 130 LBS | BODY MASS INDEX: 22.31 KG/M2 | HEART RATE: 86 BPM | RESPIRATION RATE: 18 BRPM | OXYGEN SATURATION: 95 % | TEMPERATURE: 96.9 F

## 2024-01-30 PROCEDURE — 1090000002 HH PPS REVENUE DEBIT

## 2024-01-30 PROCEDURE — 1090000001 HH PPS REVENUE CREDIT

## 2024-01-30 PROCEDURE — G0300 HHS/HOSPICE OF LPN EA 15 MIN: HCPCS | Mod: HHH

## 2024-01-30 SDOH — HEALTH STABILITY: MENTAL HEALTH: SMOKING IN HOME: 0

## 2024-01-30 SDOH — ECONOMIC STABILITY: HOUSING INSECURITY: EVIDENCE OF SMOKING MATERIAL: 0

## 2024-01-30 ASSESSMENT — PAIN SCALES - PAIN ASSESSMENT IN ADVANCED DEMENTIA (PAINAD)
BREATHING: 0
TOTALSCORE: 0
BODYLANGUAGE: 0
FACIALEXPRESSION: 0 - SMILING OR INEXPRESSIVE.
CONSOLABILITY: 0
BODYLANGUAGE: 0 - RELAXED.
FACIALEXPRESSION: 0
CONSOLABILITY: 0 - NO NEED TO CONSOLE.
NEGVOCALIZATION: 0
NEGVOCALIZATION: 0 - NONE.

## 2024-01-30 ASSESSMENT — ENCOUNTER SYMPTOMS
PAIN LOCATION - PAIN FREQUENCY: INTERMITTENT
FATIGUES EASILY: 1
STOOL FREQUENCY: LESS THAN DAILY
SUBJECTIVE PAIN PROGRESSION: UNCHANGED
PERSON REPORTING PAIN: PATIENT
FATIGUE: 1
PAIN LOCATION: BACK
LOWEST PAIN SEVERITY IN PAST 24 HOURS: 0/10
CHANGE IN APPETITE: UNCHANGED
PAIN: 1
LAST BOWEL MOVEMENT: 66869
MUSCLE WEAKNESS: 1
APPETITE LEVEL: FAIR
DYSPNEA ON EXERTION: 1
HIGHEST PAIN SEVERITY IN PAST 24 HOURS: 4/10
PAIN SEVERITY GOAL: 0/10
BOWEL PATTERN NORMAL: 1

## 2024-01-30 NOTE — HOME HEALTH
Dressing to PEG tube insertion site changed, area cleansed, without complication. Patient making appointment to have PEG tube removed. All VS WNL.

## 2024-01-31 PROCEDURE — 1090000001 HH PPS REVENUE CREDIT

## 2024-01-31 PROCEDURE — 1090000002 HH PPS REVENUE DEBIT

## 2024-02-01 ENCOUNTER — HOME CARE VISIT (OUTPATIENT)
Dept: HOME HEALTH SERVICES | Facility: HOME HEALTH | Age: 79
End: 2024-02-01
Payer: COMMERCIAL

## 2024-02-01 VITALS
TEMPERATURE: 98 F | SYSTOLIC BLOOD PRESSURE: 120 MMHG | OXYGEN SATURATION: 95 % | RESPIRATION RATE: 18 BRPM | HEART RATE: 82 BPM | DIASTOLIC BLOOD PRESSURE: 80 MMHG

## 2024-02-01 PROCEDURE — G0299 HHS/HOSPICE OF RN EA 15 MIN: HCPCS | Mod: HHH

## 2024-02-01 PROCEDURE — 1090000001 HH PPS REVENUE CREDIT

## 2024-02-01 PROCEDURE — 1090000002 HH PPS REVENUE DEBIT

## 2024-02-01 SDOH — ECONOMIC STABILITY: HOUSING INSECURITY: EVIDENCE OF SMOKING MATERIAL: 1

## 2024-02-01 SDOH — HEALTH STABILITY: MENTAL HEALTH: SMOKING IN HOME: 1

## 2024-02-01 ASSESSMENT — ENCOUNTER SYMPTOMS
MUSCLE WEAKNESS: 1
HIGHEST PAIN SEVERITY IN PAST 24 HOURS: 4/10
PAIN SEVERITY GOAL: 0/10
LOWEST PAIN SEVERITY IN PAST 24 HOURS: 0/10
APPETITE LEVEL: POOR

## 2024-02-01 ASSESSMENT — ACTIVITIES OF DAILY LIVING (ADL)
ENTERING_EXITING_HOME: NEEDS ASSISTANCE
OASIS_M1830: 03

## 2024-02-01 ASSESSMENT — PAIN SCALES - PAIN ASSESSMENT IN ADVANCED DEMENTIA (PAINAD): BREATHING: 0

## 2024-02-02 PROCEDURE — 1090000002 HH PPS REVENUE DEBIT

## 2024-02-02 PROCEDURE — 1090000001 HH PPS REVENUE CREDIT

## 2024-02-03 PROCEDURE — 169592 NO-PAY CLAIM PROCEDURE

## 2024-02-03 PROCEDURE — 400014 HH F/U

## 2024-02-03 PROCEDURE — 1090000001 HH PPS REVENUE CREDIT

## 2024-02-03 PROCEDURE — 1090000002 HH PPS REVENUE DEBIT

## 2024-02-04 PROCEDURE — 1090000001 HH PPS REVENUE CREDIT

## 2024-02-04 PROCEDURE — 1090000002 HH PPS REVENUE DEBIT

## 2024-02-05 PROCEDURE — 1090000001 HH PPS REVENUE CREDIT

## 2024-02-05 PROCEDURE — 1090000002 HH PPS REVENUE DEBIT

## 2024-02-06 PROCEDURE — 1090000002 HH PPS REVENUE DEBIT

## 2024-02-06 PROCEDURE — 1090000001 HH PPS REVENUE CREDIT

## 2024-02-07 PROCEDURE — 1090000001 HH PPS REVENUE CREDIT

## 2024-02-07 PROCEDURE — 1090000002 HH PPS REVENUE DEBIT

## 2024-02-08 PROCEDURE — 1090000002 HH PPS REVENUE DEBIT

## 2024-02-08 PROCEDURE — 1090000001 HH PPS REVENUE CREDIT

## 2024-02-09 PROCEDURE — 1090000002 HH PPS REVENUE DEBIT

## 2024-02-09 PROCEDURE — 1090000001 HH PPS REVENUE CREDIT

## 2024-02-10 PROCEDURE — 1090000002 HH PPS REVENUE DEBIT

## 2024-02-10 PROCEDURE — 1090000001 HH PPS REVENUE CREDIT

## 2024-02-11 PROCEDURE — 1090000002 HH PPS REVENUE DEBIT

## 2024-02-11 PROCEDURE — 1090000001 HH PPS REVENUE CREDIT

## 2024-02-12 PROCEDURE — 1090000001 HH PPS REVENUE CREDIT

## 2024-02-12 PROCEDURE — 1090000002 HH PPS REVENUE DEBIT

## 2024-02-13 ENCOUNTER — HOME CARE VISIT (OUTPATIENT)
Dept: HOME HEALTH SERVICES | Facility: HOME HEALTH | Age: 79
End: 2024-02-13
Payer: COMMERCIAL

## 2024-02-13 VITALS
HEART RATE: 80 BPM | RESPIRATION RATE: 18 BRPM | OXYGEN SATURATION: 97 % | SYSTOLIC BLOOD PRESSURE: 124 MMHG | TEMPERATURE: 98.3 F | DIASTOLIC BLOOD PRESSURE: 78 MMHG

## 2024-02-13 PROCEDURE — 400014 HH F/U

## 2024-02-13 PROCEDURE — 1090000001 HH PPS REVENUE CREDIT

## 2024-02-13 PROCEDURE — G0300 HHS/HOSPICE OF LPN EA 15 MIN: HCPCS | Mod: HHH

## 2024-02-13 PROCEDURE — 1090000002 HH PPS REVENUE DEBIT

## 2024-02-13 SDOH — HEALTH STABILITY: MENTAL HEALTH: SMOKING IN HOME: 0

## 2024-02-13 SDOH — ECONOMIC STABILITY: HOUSING INSECURITY: EVIDENCE OF SMOKING MATERIAL: 0

## 2024-02-13 ASSESSMENT — ENCOUNTER SYMPTOMS
APPETITE LEVEL: POOR
FATIGUES EASILY: 1
MUSCLE WEAKNESS: 1
CHANGE IN APPETITE: UNCHANGED
DENIES PAIN: 1
BOWEL PATTERN NORMAL: 1
HIGHEST PAIN SEVERITY IN PAST 24 HOURS: 2/10
LOWEST PAIN SEVERITY IN PAST 24 HOURS: 0/10
PERSON REPORTING PAIN: PATIENT
LAST BOWEL MOVEMENT: 66883
DYSPNEA ON EXERTION: 1
PAIN SEVERITY GOAL: 0/10
STOOL FREQUENCY: LESS THAN DAILY
SUBJECTIVE PAIN PROGRESSION: UNCHANGED

## 2024-02-13 ASSESSMENT — ACTIVITIES OF DAILY LIVING (ADL): MONEY MANAGEMENT (EXPENSES/BILLS): INDEPENDENT

## 2024-02-13 NOTE — HOME HEALTH
Patient is scheduled to have PEG tube removed on 2/22 by Dr. Dominique at [facility]. Chronic today to back is rated 4/10 at this time. Appetite remains poor/fair at this time, patient states she is drinking 2-3 high protein nutritional shakes daily. POX 97% on 2L O2 continuous via nasal cannula.

## 2024-02-14 PROCEDURE — 1090000002 HH PPS REVENUE DEBIT

## 2024-02-14 PROCEDURE — 1090000001 HH PPS REVENUE CREDIT

## 2024-02-14 PROCEDURE — G0179 MD RECERTIFICATION HHA PT: HCPCS | Performed by: INTERNAL MEDICINE

## 2024-02-15 PROCEDURE — 1090000001 HH PPS REVENUE CREDIT

## 2024-02-15 PROCEDURE — 1090000002 HH PPS REVENUE DEBIT

## 2024-02-16 PROCEDURE — 1090000002 HH PPS REVENUE DEBIT

## 2024-02-16 PROCEDURE — 1090000001 HH PPS REVENUE CREDIT

## 2024-02-17 PROCEDURE — 1090000002 HH PPS REVENUE DEBIT

## 2024-02-17 PROCEDURE — 1090000001 HH PPS REVENUE CREDIT

## 2024-02-18 PROCEDURE — 1090000002 HH PPS REVENUE DEBIT

## 2024-02-18 PROCEDURE — 1090000001 HH PPS REVENUE CREDIT

## 2024-02-19 ENCOUNTER — HOME CARE VISIT (OUTPATIENT)
Dept: HOME HEALTH SERVICES | Facility: HOME HEALTH | Age: 79
End: 2024-02-19
Payer: COMMERCIAL

## 2024-02-19 VITALS
SYSTOLIC BLOOD PRESSURE: 120 MMHG | TEMPERATURE: 97.9 F | HEART RATE: 78 BPM | OXYGEN SATURATION: 98 % | RESPIRATION RATE: 18 BRPM | DIASTOLIC BLOOD PRESSURE: 72 MMHG

## 2024-02-19 PROCEDURE — G0300 HHS/HOSPICE OF LPN EA 15 MIN: HCPCS | Mod: HHH

## 2024-02-19 PROCEDURE — 1090000001 HH PPS REVENUE CREDIT

## 2024-02-19 PROCEDURE — 1090000002 HH PPS REVENUE DEBIT

## 2024-02-19 SDOH — HEALTH STABILITY: MENTAL HEALTH: SMOKING IN HOME: 1

## 2024-02-19 SDOH — ECONOMIC STABILITY: HOUSING INSECURITY: EVIDENCE OF SMOKING MATERIAL: 1

## 2024-02-19 ASSESSMENT — ENCOUNTER SYMPTOMS
PAIN LOCATION: BACK
HIGHEST PAIN SEVERITY IN PAST 24 HOURS: 6/10
SUBJECTIVE PAIN PROGRESSION: UNCHANGED
DYSPNEA ON EXERTION: 1
APPETITE LEVEL: POOR
LAST BOWEL MOVEMENT: 66889
CHANGE IN APPETITE: UNCHANGED
FATIGUE: 1
PAIN: 1
PAIN LOCATION - PAIN SEVERITY: 6/10
BOWEL PATTERN NORMAL: 1
LOWEST PAIN SEVERITY IN PAST 24 HOURS: 0/10
STOOL FREQUENCY: DAILY
MUSCLE WEAKNESS: 1
FATIGUES EASILY: 1
PERSON REPORTING PAIN: PATIENT
PAIN SEVERITY GOAL: 0/10

## 2024-02-19 ASSESSMENT — ACTIVITIES OF DAILY LIVING (ADL): MONEY MANAGEMENT (EXPENSES/BILLS): INDEPENDENT

## 2024-02-19 NOTE — HOME HEALTH
Patient being seen by Dr. Smith on Thursday 2/22 to have PEG tube removed. All VS WNL. Patient states she does have back pain when standing or ambulating, rates pain 0/10 arrest. Appetite remains poor, patient states she’s currently comfortable in her home and pain is being sufficiently managed. Per patient, she last saw oncologist about 6 months but at the time felt too overwhelmed to pursue the proposed treatment.

## 2024-02-20 PROCEDURE — 1090000001 HH PPS REVENUE CREDIT

## 2024-02-20 PROCEDURE — 1090000002 HH PPS REVENUE DEBIT

## 2024-02-21 PROCEDURE — 1090000002 HH PPS REVENUE DEBIT

## 2024-02-21 PROCEDURE — 1090000001 HH PPS REVENUE CREDIT

## 2024-02-22 ENCOUNTER — OFFICE VISIT (OUTPATIENT)
Dept: SURGERY | Facility: CLINIC | Age: 79
End: 2024-02-22
Payer: COMMERCIAL

## 2024-02-22 VITALS — WEIGHT: 120 LBS | BODY MASS INDEX: 20.6 KG/M2

## 2024-02-22 DIAGNOSIS — K94.23 MALFUNCTION OF PERCUTANEOUS ENDOSCOPIC GASTROSTOMY (PEG) TUBE (MULTI): Primary | ICD-10-CM

## 2024-02-22 PROCEDURE — 1125F AMNT PAIN NOTED PAIN PRSNT: CPT | Performed by: SURGERY

## 2024-02-22 PROCEDURE — 1090000001 HH PPS REVENUE CREDIT

## 2024-02-22 PROCEDURE — 1157F ADVNC CARE PLAN IN RCRD: CPT | Performed by: SURGERY

## 2024-02-22 PROCEDURE — 1090000002 HH PPS REVENUE DEBIT

## 2024-02-22 PROCEDURE — 1160F RVW MEDS BY RX/DR IN RCRD: CPT | Performed by: SURGERY

## 2024-02-22 PROCEDURE — 99215 OFFICE O/P EST HI 40 MIN: CPT | Performed by: SURGERY

## 2024-02-22 PROCEDURE — 1159F MED LIST DOCD IN RCRD: CPT | Performed by: SURGERY

## 2024-02-22 ASSESSMENT — PAIN SCALES - GENERAL: PAINLEVEL: 1

## 2024-02-22 NOTE — PROGRESS NOTES
Subjective   Patient ID: Valorie Montelongo is a 78 y.o. female who presents for peg tube removal (Requesting peg tube removal).  The patient tolerates regular diet.  Patient does not use the PEG tube.    HPI previous PEG tube placement for dysphagia.  Currently patient does not use the PEG tube.  Patient complains on leakage around PEG tube, discomfort.  Review of Systems GI system consistent with the pain around the PEG tube, leakage.  Constitutional generalized weakness.  Respiratory shortness of breath.  Review of all other 10 system is negative  Physical Exam  Pulse equal bilaterally, mucosa moist, bilateral breath sounds, regular rhythm and rate, abdomen soft, nontender, PEG tube is in the place.  Palpable peripheral pulses.  Patient moves all extremities.  No focal neurological motor deficits.  The patient has indication for PEG tube removal.  Remove drains/tubes    Date/Time: 2/22/2024 10:32 AM    Performed by: Singh Smith MD  Authorized by: Singh Smith MD    Consent:     Consent obtained:  Verbal    Consent given by:  Patient    Risks, benefits, and alternatives were discussed: yes      Risks discussed:  Bleeding, infection and pain  Pre-procedure details:     Skin preparation:  Povidone-iodine  Anesthesia:     Anesthesia method:  Local infiltration    Objective I reviewed all available data including lab results, radiological studies, previous reports and notes.  The PEG tube was removed without complications.  Dry sterile dressing was applied.    No diagnosis found.   Patient Active Problem List   Diagnosis    Metastatic adenocarcinoma (CMS/HCC)    Pulmonary cancer (CMS/HCC)    HTN (hypertension)    Hyperlipidemia    MI (myocardial infarction) (CMS/HCC)    Chronic obstructive pulmonary disease (CMS/HCC)    History of home oxygen therapy    Gastroesophageal reflux disease    Anxiety      Allergies   Allergen Reactions    Fluoxetine Insomnia    Levofloxacin Itching and Rash      Medication  Documentation Review Audit       Reviewed by Singh Smith MD (Physician) on 24 at 1031      Medication Order Taking? Sig Documenting Provider Last Dose Status   albuterol 2.5 mg /3 mL (0.083 %) nebulizer solution [id number]  Take 3 mL (2.5 mg) by nebulization every 6 hours if needed for wheezing. Triny Mao PA-C  Active   albuterol 90 mcg/actuation inhaler [id number]  Inhale 2 puffs every 6 hours if needed for wheezing or shortness of breath. Historical Provider, MD  Active   amLODIPine (Norvasc) 5 mg tablet [id number]  Take 1 tablet (5 mg) by mouth once daily. Historical Provider, MD  Active   aspirin 81 mg chewable tablet [id number]  Chew 1 tablet (81 mg) once daily. Historical Provider, MD  Active   atorvastatin (Lipitor) 10 mg tablet [id number]  Take 1 tablet (10 mg) by mouth once daily. Historical Provider, MD  Active   cholecalciferol (Vitamin D-3) 25 MCG (1000 UT) tablet [id number]  Take 2 tablets (2,000 Units) by mouth once daily. Historical Provider, MD  Active   dexAMETHasone (dexAMETHasone IntensoL) 1 mg/mL solution [id number]  Give 4 mL (4 mg) by g-tube route 2 times a day for 10 days. discard remainder Darline Zavala APRN-CNP   23 2359   docusate sodium (Colace) 100 mg capsule [id number]  Take 1 capsule (100 mg) by mouth if needed for constipation. Historical Provider, MD  Active   fesoterodine (Toviaz) 4 mg tablet extended release 24 hr [id number]  Take 1 tablet (4 mg) by mouth once daily. Historical Provider, MD  Active   HYDROcodone-acetaminophen (Norco)  mg tablet [id number] Yes Take 1 tablet by mouth every 4 hours if needed for moderate pain (4 - 6). Franca Provider, MD Taking Active   ipratropium (Atrovent) 21 mcg (0.03 %) nasal spray [id number]  Administer 2 sprays into each nostril 3 times a day as needed for rhinitis. Franca Provider, MD  Active   irbesartan (Avapro) 150 mg tablet [id number] Yes Take 1 tablet (150 mg) by mouth once daily. Franca Provider  MD Taking Active   levETIRAcetam (Keppra) 100 mg/mL solution [id number]  Take 5 mL (500 mg) by g-tube route 2 times a day. PEDRO LUIS Schroeder  Active   levothyroxine (Synthroid, Levoxyl) 137 mcg tablet [id number] Yes Take 1 tablet (137 mcg) by mouth once daily in the morning. Take before meals. Historical Provider, MD Taking Active   LORazepam (Ativan) 1 mg tablet [id number]  Take 1 tablet (1 mg) by mouth once daily as needed for anxiety. Historical Provider, MD  Active   ondansetron (Zofran) 8 mg tablet [id number]  Take 1 tablet (8 mg) by mouth every 8 hours if needed for nausea or vomiting. Historical Provider, MD  Active   oxygen (O2) gas therapy [id number]  Inhale 2 L/min continuously. Indications: worsening chronic obstructive pulmonary disease Historical Provider, MD  Active   pantoprazole (Protonix) 40 mg EC tablet [id number]  Take 1 tablet (40 mg) by mouth every 12 hours if needed (acid reflux). Historical Provider, MD  Active   pantoprazole (ProtoNix) 40 mg EC tablet [id number]  Take 1 tablet (40 mg) by mouth once daily in the morning. Take before meals. Do not crush, chew, or split. PEDRO LUIS Schroeder   24 2359   vit A/vit C/vit E/zinc/copper (ICAPS AREDS ORAL) [id number]  Take 1 capsule by mouth once daily. Historical Provider, MD  Active                    History reviewed. No pertinent past medical history.  Social History     Tobacco Use   Smoking Status Every Day    Types: Cigarettes   Smokeless Tobacco Current     No family history on file.   Past Surgical History:   Procedure Laterality Date    CT HEAD ANGIO W AND WO IV CONTRAST  2019    CT HEAD ANGIO W AND WO IV CONTRAST 2019 [facility]    OTHER SURGICAL HISTORY  2021    Hysterectomy    OTHER SURGICAL HISTORY  2021    Cholecystectomy    OTHER SURGICAL HISTORY  2021    Cataract surgery    OTHER SURGICAL HISTORY  2021    Lung surgery    OTHER SURGICAL HISTORY  2021    Pleural biopsy     OTHER SURGICAL HISTORY  12/09/2021    Lung wedge resection    OTHER SURGICAL HISTORY  09/28/2021    Thyroidectomy total    OTHER SURGICAL HISTORY  09/29/2021    Percutaneous endoscopic gastrostomy tube removal    OTHER SURGICAL HISTORY  09/29/2021    Percutaneous endoscopic gastrostomy tube insertion       Assessment/Plan   Patient with history of dysphagia, malfunction of PEG tube.  Leakage around the PEG tube.  Patient tolerates diet.  PEG tube was removed without complications.  Recommended dressing changes until complete healing.  Follow-up as needed      Singh Smith MD

## 2024-02-23 PROCEDURE — 1090000001 HH PPS REVENUE CREDIT

## 2024-02-23 PROCEDURE — 1090000002 HH PPS REVENUE DEBIT

## 2024-02-24 ENCOUNTER — HOME CARE VISIT (OUTPATIENT)
Dept: HOME HEALTH SERVICES | Facility: HOME HEALTH | Age: 79
End: 2024-02-24
Payer: COMMERCIAL

## 2024-02-24 VITALS
HEART RATE: 76 BPM | SYSTOLIC BLOOD PRESSURE: 126 MMHG | RESPIRATION RATE: 18 BRPM | OXYGEN SATURATION: 96 % | DIASTOLIC BLOOD PRESSURE: 74 MMHG | TEMPERATURE: 97.3 F

## 2024-02-24 PROCEDURE — 1090000001 HH PPS REVENUE CREDIT

## 2024-02-24 PROCEDURE — G0300 HHS/HOSPICE OF LPN EA 15 MIN: HCPCS | Mod: HHH

## 2024-02-24 PROCEDURE — 1090000002 HH PPS REVENUE DEBIT

## 2024-02-24 SDOH — ECONOMIC STABILITY: HOUSING INSECURITY: EVIDENCE OF SMOKING MATERIAL: 1

## 2024-02-24 SDOH — HEALTH STABILITY: MENTAL HEALTH: SMOKING IN HOME: 1

## 2024-02-24 ASSESSMENT — ENCOUNTER SYMPTOMS
STOOL FREQUENCY: DAILY
LAST BOWEL MOVEMENT: 66894
FATIGUES EASILY: 1
APPETITE LEVEL: POOR
DYSPNEA ON EXERTION: 1
COUGH: 1
CHANGE IN APPETITE: DECREASED
HIGHEST PAIN SEVERITY IN PAST 24 HOURS: 7/10
PAIN LOCATION - PAIN FREQUENCY: INTERMITTENT
PAIN SEVERITY GOAL: 0/10
SUBJECTIVE PAIN PROGRESSION: GRADUALLY WORSENING
BOWEL PATTERN NORMAL: 1
COUGH CHARACTERISTICS: DRY
PAIN: 1
LOWEST PAIN SEVERITY IN PAST 24 HOURS: 2/10
PAIN LOCATION - PAIN QUALITY: ACHE, SHARP
MUSCLE WEAKNESS: 1
COUGH CHARACTERISTICS: NON-PRODUCTIVE
PAIN LOCATION: BACK
PAIN LOCATION - PAIN SEVERITY: 7/10

## 2024-02-24 ASSESSMENT — ACTIVITIES OF DAILY LIVING (ADL)
MONEY MANAGEMENT (EXPENSES/BILLS): INDEPENDENT
AMBULATION ASSISTANCE: STAND BY ASSIST
CURRENT_FUNCTION: STAND BY ASSIST

## 2024-02-24 NOTE — HOME HEALTH
Patient had feeding tube removed last week. Per patient. She is drinking two nutritional shakes daily and making an effort to eat small protein-rich snacks throughout the day. PEG tube insertion site observed to be clean, dry and intact with no fresh drainage present. Area is JAMES. patient states with tube out she was finally able to sleep in her own bed last night after months of sleeping in the living room in her recliner. All VS WNL.

## 2024-02-25 PROCEDURE — 1090000002 HH PPS REVENUE DEBIT

## 2024-02-25 PROCEDURE — 1090000001 HH PPS REVENUE CREDIT

## 2024-02-26 PROCEDURE — 1090000002 HH PPS REVENUE DEBIT

## 2024-02-26 PROCEDURE — 1090000001 HH PPS REVENUE CREDIT

## 2024-02-27 ENCOUNTER — HOME CARE VISIT (OUTPATIENT)
Dept: HOME HEALTH SERVICES | Facility: HOME HEALTH | Age: 79
End: 2024-02-27
Payer: COMMERCIAL

## 2024-02-27 VITALS
OXYGEN SATURATION: 97 % | TEMPERATURE: 97.7 F | DIASTOLIC BLOOD PRESSURE: 70 MMHG | RESPIRATION RATE: 18 BRPM | HEART RATE: 72 BPM | SYSTOLIC BLOOD PRESSURE: 120 MMHG

## 2024-02-27 PROCEDURE — 1090000001 HH PPS REVENUE CREDIT

## 2024-02-27 PROCEDURE — G0300 HHS/HOSPICE OF LPN EA 15 MIN: HCPCS | Mod: HHH

## 2024-02-27 PROCEDURE — 1090000002 HH PPS REVENUE DEBIT

## 2024-02-27 ASSESSMENT — ENCOUNTER SYMPTOMS
STOOL FREQUENCY: LESS THAN DAILY
DYSPNEA ACTIVITY LEVEL: AFTER AMBULATING LESS THAN 10 FT
DYSPNEA ON EXERTION: 1
FATIGUES EASILY: 1
APPETITE LEVEL: POOR
COUGH: 1
NAUSEA: 1
COUGH CHARACTERISTICS: DRY
MUSCLE WEAKNESS: 1
BOWEL PATTERN NORMAL: 1
LOWEST PAIN SEVERITY IN PAST 24 HOURS: 2/10
PAIN LOCATION - PAIN SEVERITY: 5/10
FATIGUE: 1
PERSON REPORTING PAIN: PATIENT
HIGHEST PAIN SEVERITY IN PAST 24 HOURS: 7/10
CHANGE IN APPETITE: UNCHANGED
LAST BOWEL MOVEMENT: 66897
PAIN: 1
LIMITED RANGE OF MOTION: 1
SHORTNESS OF BREATH: 1
PAIN SEVERITY GOAL: 0/10
PAIN LOCATION: BACK
COUGH CHARACTERISTICS: NON-PRODUCTIVE
SUBJECTIVE PAIN PROGRESSION: GRADUALLY WORSENING

## 2024-02-27 ASSESSMENT — ACTIVITIES OF DAILY LIVING (ADL)
AMBULATION ASSISTANCE: STAND BY ASSIST
CURRENT_FUNCTION: STAND BY ASSIST
MONEY MANAGEMENT (EXPENSES/BILLS): NEEDS ASSISTANCE

## 2024-02-27 NOTE — HOME HEALTH
Patient seen today for routine nursing visit. All VS WNL. Patient rates back pain today 5/10. Healing PEG tube insertion site is 100% epithelialized, patient is sleeping comfortably in her bed since tube was removed. Appetite remains poor and patient continues to supplement with 2 nutritional shakes daily. Patient continues on 2L O2 via N/C  continuous.

## 2024-02-28 PROCEDURE — 1090000001 HH PPS REVENUE CREDIT

## 2024-02-28 PROCEDURE — 1090000002 HH PPS REVENUE DEBIT

## 2024-02-29 ENCOUNTER — HOME CARE VISIT (OUTPATIENT)
Dept: HOME HEALTH SERVICES | Facility: HOME HEALTH | Age: 79
End: 2024-02-29
Payer: COMMERCIAL

## 2024-02-29 PROCEDURE — 1090000002 HH PPS REVENUE DEBIT

## 2024-02-29 PROCEDURE — 1090000001 HH PPS REVENUE CREDIT

## 2024-03-01 PROCEDURE — 1090000002 HH PPS REVENUE DEBIT

## 2024-03-01 PROCEDURE — 1090000001 HH PPS REVENUE CREDIT

## 2024-03-02 PROCEDURE — 1090000001 HH PPS REVENUE CREDIT

## 2024-03-02 PROCEDURE — 1090000002 HH PPS REVENUE DEBIT

## 2024-03-03 PROCEDURE — 1090000001 HH PPS REVENUE CREDIT

## 2024-03-03 PROCEDURE — 1090000002 HH PPS REVENUE DEBIT

## 2024-03-04 PROCEDURE — 1090000002 HH PPS REVENUE DEBIT

## 2024-03-04 PROCEDURE — 1090000001 HH PPS REVENUE CREDIT

## 2024-03-05 ENCOUNTER — HOME CARE VISIT (OUTPATIENT)
Dept: HOME HEALTH SERVICES | Facility: HOME HEALTH | Age: 79
End: 2024-03-05
Payer: COMMERCIAL

## 2024-03-05 VITALS
TEMPERATURE: 97.8 F | RESPIRATION RATE: 18 BRPM | HEART RATE: 72 BPM | SYSTOLIC BLOOD PRESSURE: 112 MMHG | DIASTOLIC BLOOD PRESSURE: 64 MMHG | OXYGEN SATURATION: 96 %

## 2024-03-05 PROCEDURE — G0300 HHS/HOSPICE OF LPN EA 15 MIN: HCPCS | Mod: HHH

## 2024-03-05 PROCEDURE — 1090000002 HH PPS REVENUE DEBIT

## 2024-03-05 PROCEDURE — 400014 HH F/U

## 2024-03-05 PROCEDURE — 1090000001 HH PPS REVENUE CREDIT

## 2024-03-05 SDOH — ECONOMIC STABILITY: HOUSING INSECURITY: EVIDENCE OF SMOKING MATERIAL: 0

## 2024-03-05 SDOH — HEALTH STABILITY: MENTAL HEALTH: SMOKING IN HOME: 0

## 2024-03-05 ASSESSMENT — ENCOUNTER SYMPTOMS
CHANGE IN APPETITE: UNCHANGED
LAST BOWEL MOVEMENT: 66904
PAIN LOCATION - PAIN QUALITY: ACHE
APPETITE LEVEL: POOR
PAIN LOCATION - PAIN SEVERITY: 6/10
SUBJECTIVE PAIN PROGRESSION: UNCHANGED
PAIN SEVERITY GOAL: 0/10
HIGHEST PAIN SEVERITY IN PAST 24 HOURS: 7/10
LOWEST PAIN SEVERITY IN PAST 24 HOURS: 2/10
STOOL FREQUENCY: DAILY
PAIN LOCATION - PAIN FREQUENCY: INTERMITTENT
FATIGUES EASILY: 1
PAIN: 1
COUGH CHARACTERISTICS: NON-PRODUCTIVE
BOWEL PATTERN NORMAL: 1
PAIN LOCATION: BACK
DYSPNEA ON EXERTION: 1
COUGH CHARACTERISTICS: DRY
PERSON REPORTING PAIN: PATIENT
COUGH: 1
MUSCLE WEAKNESS: 1

## 2024-03-05 ASSESSMENT — ACTIVITIES OF DAILY LIVING (ADL)
AMBULATION ASSISTANCE: STAND BY ASSIST
CURRENT_FUNCTION: STAND BY ASSIST
MONEY MANAGEMENT (EXPENSES/BILLS): INDEPENDENT

## 2024-03-05 NOTE — HOME HEALTH
Patient seen today for routine visit, c/o chronic back pain 6/10 but states PRN pain meds and rest are effective at relieving pain. Appetite remains poor. Patient states she contacted her oncologist yesterday, as she has been putting off an appointment for several months now, is currently awaiting a call back. She feels she is ready at this point to find out “where the cancer has spread”. VS WNL.

## 2024-03-06 PROCEDURE — 1090000002 HH PPS REVENUE DEBIT

## 2024-03-06 PROCEDURE — 1090000001 HH PPS REVENUE CREDIT

## 2024-03-07 PROCEDURE — 1090000001 HH PPS REVENUE CREDIT

## 2024-03-07 PROCEDURE — 1090000002 HH PPS REVENUE DEBIT

## 2024-03-08 ENCOUNTER — HOME CARE VISIT (OUTPATIENT)
Dept: HOME HEALTH SERVICES | Facility: HOME HEALTH | Age: 79
End: 2024-03-08
Payer: COMMERCIAL

## 2024-03-08 VITALS
HEART RATE: 85 BPM | OXYGEN SATURATION: 96 % | RESPIRATION RATE: 18 BRPM | DIASTOLIC BLOOD PRESSURE: 80 MMHG | TEMPERATURE: 98.3 F | SYSTOLIC BLOOD PRESSURE: 140 MMHG

## 2024-03-08 PROCEDURE — G0299 HHS/HOSPICE OF RN EA 15 MIN: HCPCS | Mod: HHH

## 2024-03-08 PROCEDURE — 1090000002 HH PPS REVENUE DEBIT

## 2024-03-08 PROCEDURE — 1090000001 HH PPS REVENUE CREDIT

## 2024-03-08 SDOH — ECONOMIC STABILITY: HOUSING INSECURITY: EVIDENCE OF SMOKING MATERIAL: 1

## 2024-03-08 SDOH — HEALTH STABILITY: MENTAL HEALTH: SMOKING IN HOME: 1

## 2024-03-08 ASSESSMENT — ENCOUNTER SYMPTOMS
MUSCLE WEAKNESS: 1
HIGHEST PAIN SEVERITY IN PAST 24 HOURS: 6/10
PAIN SEVERITY GOAL: 0/10
LOWEST PAIN SEVERITY IN PAST 24 HOURS: 0/10
APPETITE LEVEL: POOR

## 2024-03-08 ASSESSMENT — PAIN SCALES - PAIN ASSESSMENT IN ADVANCED DEMENTIA (PAINAD): BREATHING: 1

## 2024-03-09 PROCEDURE — 1090000001 HH PPS REVENUE CREDIT

## 2024-03-09 PROCEDURE — 1090000002 HH PPS REVENUE DEBIT

## 2024-03-10 PROCEDURE — 1090000001 HH PPS REVENUE CREDIT

## 2024-03-10 PROCEDURE — 1090000002 HH PPS REVENUE DEBIT

## 2024-03-11 PROCEDURE — 1090000001 HH PPS REVENUE CREDIT

## 2024-03-11 PROCEDURE — 1090000002 HH PPS REVENUE DEBIT

## 2024-03-12 ENCOUNTER — HOME CARE VISIT (OUTPATIENT)
Dept: HOME HEALTH SERVICES | Facility: HOME HEALTH | Age: 79
End: 2024-03-12
Payer: COMMERCIAL

## 2024-03-12 VITALS
SYSTOLIC BLOOD PRESSURE: 128 MMHG | RESPIRATION RATE: 18 BRPM | TEMPERATURE: 97.7 F | DIASTOLIC BLOOD PRESSURE: 76 MMHG | HEART RATE: 82 BPM | OXYGEN SATURATION: 99 %

## 2024-03-12 PROCEDURE — 1090000002 HH PPS REVENUE DEBIT

## 2024-03-12 PROCEDURE — G0300 HHS/HOSPICE OF LPN EA 15 MIN: HCPCS | Mod: HHH

## 2024-03-12 PROCEDURE — 1090000001 HH PPS REVENUE CREDIT

## 2024-03-12 SDOH — HEALTH STABILITY: MENTAL HEALTH: SMOKING IN HOME: 0

## 2024-03-12 SDOH — ECONOMIC STABILITY: HOUSING INSECURITY: EVIDENCE OF SMOKING MATERIAL: 0

## 2024-03-12 ASSESSMENT — ENCOUNTER SYMPTOMS
FATIGUES EASILY: 1
COUGH CHARACTERISTICS: NON-PRODUCTIVE
PAIN SEVERITY GOAL: 0/10
COUGH: 1
MUSCLE WEAKNESS: 1
PAIN: 1
PERSON REPORTING PAIN: PATIENT
LOWEST PAIN SEVERITY IN PAST 24 HOURS: 2/10
PAIN LOCATION - PAIN SEVERITY: 6/10
PAIN LOCATION: BACK
COUGH CHARACTERISTICS: DRY
LAST BOWEL MOVEMENT: 66910
SUBJECTIVE PAIN PROGRESSION: GRADUALLY WORSENING
PAIN LOCATION - PAIN FREQUENCY: INTERMITTENT
STOOL FREQUENCY: LESS THAN DAILY
APPETITE LEVEL: POOR
BOWEL PATTERN NORMAL: 1
HIGHEST PAIN SEVERITY IN PAST 24 HOURS: 7/10
DYSPNEA ON EXERTION: 1
CHANGE IN APPETITE: UNCHANGED

## 2024-03-12 ASSESSMENT — ACTIVITIES OF DAILY LIVING (ADL)
AMBULATION ASSISTANCE: STAND BY ASSIST
MONEY MANAGEMENT (EXPENSES/BILLS): NEEDS ASSISTANCE
CURRENT_FUNCTION: STAND BY ASSIST

## 2024-03-12 NOTE — HOME HEALTH
Patient seen today for routine visit. She has bloodwork scheduled for this Thursday and also a scan of chest scheduled for this Friday at [facility]. Followup visit with [facility] oncologist is scheduled for 3/22/25. Appetite remains poor at this time, endurance and strength continue a gradual decline. Patient denies any issues with moving bowels or voiding. All VS WNL.

## 2024-03-13 PROCEDURE — 1090000001 HH PPS REVENUE CREDIT

## 2024-03-13 PROCEDURE — 1090000002 HH PPS REVENUE DEBIT

## 2024-03-14 PROCEDURE — 1090000001 HH PPS REVENUE CREDIT

## 2024-03-14 PROCEDURE — 1090000002 HH PPS REVENUE DEBIT

## 2024-03-15 PROCEDURE — 1090000002 HH PPS REVENUE DEBIT

## 2024-03-15 PROCEDURE — 1090000001 HH PPS REVENUE CREDIT

## 2024-03-16 PROCEDURE — 1090000001 HH PPS REVENUE CREDIT

## 2024-03-16 PROCEDURE — 1090000002 HH PPS REVENUE DEBIT

## 2024-03-17 PROCEDURE — 1090000001 HH PPS REVENUE CREDIT

## 2024-03-17 PROCEDURE — 1090000002 HH PPS REVENUE DEBIT

## 2024-03-18 PROCEDURE — 1090000002 HH PPS REVENUE DEBIT

## 2024-03-18 PROCEDURE — 1090000001 HH PPS REVENUE CREDIT

## 2024-03-19 PROCEDURE — 1090000001 HH PPS REVENUE CREDIT

## 2024-03-19 PROCEDURE — 1090000002 HH PPS REVENUE DEBIT

## 2024-03-20 PROCEDURE — 1090000001 HH PPS REVENUE CREDIT

## 2024-03-20 PROCEDURE — 1090000002 HH PPS REVENUE DEBIT

## 2024-03-21 PROCEDURE — 1090000001 HH PPS REVENUE CREDIT

## 2024-03-21 PROCEDURE — 1090000002 HH PPS REVENUE DEBIT

## 2024-03-22 ENCOUNTER — HOME CARE VISIT (OUTPATIENT)
Dept: HOME HEALTH SERVICES | Facility: HOME HEALTH | Age: 79
End: 2024-03-22
Payer: COMMERCIAL

## 2024-03-22 PROCEDURE — 1090000002 HH PPS REVENUE DEBIT

## 2024-03-22 PROCEDURE — 1090000001 HH PPS REVENUE CREDIT

## 2024-03-23 PROCEDURE — 1090000002 HH PPS REVENUE DEBIT

## 2024-03-23 PROCEDURE — 1090000001 HH PPS REVENUE CREDIT

## 2024-03-24 PROCEDURE — 1090000002 HH PPS REVENUE DEBIT

## 2024-03-24 PROCEDURE — 1090000001 HH PPS REVENUE CREDIT

## 2024-03-25 ENCOUNTER — HOME CARE VISIT (OUTPATIENT)
Dept: HOME HEALTH SERVICES | Facility: HOME HEALTH | Age: 79
End: 2024-03-25
Payer: COMMERCIAL

## 2024-03-25 VITALS
HEART RATE: 78 BPM | OXYGEN SATURATION: 98 % | SYSTOLIC BLOOD PRESSURE: 122 MMHG | DIASTOLIC BLOOD PRESSURE: 70 MMHG | TEMPERATURE: 97.5 F | RESPIRATION RATE: 18 BRPM

## 2024-03-25 PROCEDURE — 1090000002 HH PPS REVENUE DEBIT

## 2024-03-25 PROCEDURE — 1090000001 HH PPS REVENUE CREDIT

## 2024-03-25 PROCEDURE — G0300 HHS/HOSPICE OF LPN EA 15 MIN: HCPCS | Mod: HHH

## 2024-03-25 SDOH — ECONOMIC STABILITY: HOUSING INSECURITY: EVIDENCE OF SMOKING MATERIAL: 0

## 2024-03-25 SDOH — HEALTH STABILITY: MENTAL HEALTH: SMOKING IN HOME: 0

## 2024-03-25 ASSESSMENT — ENCOUNTER SYMPTOMS
SHORTNESS OF BREATH: 1
MUSCLE WEAKNESS: 1
FATIGUES EASILY: 1
FATIGUE: 1
DYSPNEA ACTIVITY LEVEL: AFTER AMBULATING LESS THAN 10 FT
SUBJECTIVE PAIN PROGRESSION: UNCHANGED
DRY SKIN: 1
PAIN LOCATION: BACK
PAIN SEVERITY GOAL: 0/10
COUGH CHARACTERISTICS: WEAK
STOOL FREQUENCY: LESS THAN DAILY
DYSPNEA ON EXERTION: 1
COUGH CHARACTERISTICS: PRODUCTIVE
LAST BOWEL MOVEMENT: 66924
PERSON REPORTING PAIN: PATIENT
HIGHEST PAIN SEVERITY IN PAST 24 HOURS: 6/10
COUGH CHARACTERISTICS: MOIST
BOWEL PATTERN NORMAL: 1
CHANGE IN APPETITE: UNCHANGED
PAIN: 1
COUGH: 1
APPETITE LEVEL: POOR
PAIN LOCATION - PAIN SEVERITY: 6/10

## 2024-03-25 ASSESSMENT — ACTIVITIES OF DAILY LIVING (ADL)
AMBULATION ASSISTANCE: ONE PERSON
AMBULATION ASSISTANCE: STAND BY ASSIST
CURRENT_FUNCTION: STAND BY ASSIST
CURRENT_FUNCTION: ONE PERSON
MONEY MANAGEMENT (EXPENSES/BILLS): TOTALLY DEPENDENT

## 2024-03-25 NOTE — HOME HEALTH
Patient had CT scan 2 weeks ago and met oncologist last Friday. Per patient, results of CT scan showed that her cancer has spread to her lymph nodes. Patient is awaiting a call from Dr. Mathias’s office regarding whether or not she should begin taking Lobrena 25mg. VS WNL. Appetite remains poor, patient is supplementing with 2 nutritional shakes daily. 
PAST MEDICAL HISTORY:  Anxiety     Mental health problem     Osteoporosis

## 2024-03-26 PROCEDURE — 1090000002 HH PPS REVENUE DEBIT

## 2024-03-26 PROCEDURE — 1090000001 HH PPS REVENUE CREDIT

## 2024-03-27 PROCEDURE — 1090000002 HH PPS REVENUE DEBIT

## 2024-03-27 PROCEDURE — 1090000001 HH PPS REVENUE CREDIT

## 2024-03-28 PROCEDURE — 1090000002 HH PPS REVENUE DEBIT

## 2024-03-28 PROCEDURE — 1090000001 HH PPS REVENUE CREDIT

## 2024-03-29 PROCEDURE — 1090000002 HH PPS REVENUE DEBIT

## 2024-03-29 PROCEDURE — 1090000001 HH PPS REVENUE CREDIT

## 2024-03-30 PROCEDURE — 1090000002 HH PPS REVENUE DEBIT

## 2024-03-30 PROCEDURE — 1090000001 HH PPS REVENUE CREDIT

## 2024-03-31 PROCEDURE — 1090000002 HH PPS REVENUE DEBIT

## 2024-03-31 PROCEDURE — 1090000001 HH PPS REVENUE CREDIT

## 2024-04-01 PROCEDURE — 1090000002 HH PPS REVENUE DEBIT

## 2024-04-01 PROCEDURE — 1090000001 HH PPS REVENUE CREDIT

## 2024-04-02 ENCOUNTER — HOME CARE VISIT (OUTPATIENT)
Dept: HOME HEALTH SERVICES | Facility: HOME HEALTH | Age: 79
End: 2024-04-02
Payer: COMMERCIAL

## 2024-04-02 VITALS
TEMPERATURE: 98.3 F | HEART RATE: 72 BPM | SYSTOLIC BLOOD PRESSURE: 138 MMHG | RESPIRATION RATE: 18 BRPM | OXYGEN SATURATION: 98 % | DIASTOLIC BLOOD PRESSURE: 80 MMHG

## 2024-04-02 PROCEDURE — G0299 HHS/HOSPICE OF RN EA 15 MIN: HCPCS | Mod: HHH

## 2024-04-02 PROCEDURE — 1090000002 HH PPS REVENUE DEBIT

## 2024-04-02 PROCEDURE — 1090000001 HH PPS REVENUE CREDIT

## 2024-04-02 SDOH — HEALTH STABILITY: MENTAL HEALTH: SMOKING IN HOME: 1

## 2024-04-02 SDOH — ECONOMIC STABILITY: HOUSING INSECURITY: EVIDENCE OF SMOKING MATERIAL: 1

## 2024-04-02 ASSESSMENT — ENCOUNTER SYMPTOMS
PAIN SEVERITY GOAL: 0/10
LOWEST PAIN SEVERITY IN PAST 24 HOURS: 0/10
HIGHEST PAIN SEVERITY IN PAST 24 HOURS: 6/10

## 2024-04-02 ASSESSMENT — ACTIVITIES OF DAILY LIVING (ADL)
OASIS_M1830: 01
HOME_HEALTH_OASIS: 01

## 2024-05-17 ENCOUNTER — TELEPHONE (OUTPATIENT)
Dept: RADIATION ONCOLOGY | Facility: HOSPITAL | Age: 79
End: 2024-05-17
Payer: COMMERCIAL

## 2024-05-17 NOTE — TELEPHONE ENCOUNTER
Called pt to remind of appointment on 9/20/24 at 10:00. Pt's phone went to voicemail left number if needs to reschedule.

## 2024-05-20 ENCOUNTER — APPOINTMENT (OUTPATIENT)
Dept: RADIATION ONCOLOGY | Facility: HOSPITAL | Age: 79
End: 2024-05-20
Payer: COMMERCIAL

## 2024-09-24 ENCOUNTER — APPOINTMENT (OUTPATIENT)
Dept: CT IMAGING | Age: 79
End: 2024-09-24
Payer: COMMERCIAL

## 2024-09-24 ENCOUNTER — APPOINTMENT (OUTPATIENT)
Dept: GENERAL RADIOLOGY | Age: 79
End: 2024-09-24
Payer: COMMERCIAL

## 2024-09-24 ENCOUNTER — HOSPITAL ENCOUNTER (EMERGENCY)
Age: 79
Discharge: HOME | End: 2024-09-24
Attending: EMERGENCY MEDICINE
Payer: COMMERCIAL

## 2024-09-24 VITALS
TEMPERATURE: 97.4 F | WEIGHT: 117 LBS | OXYGEN SATURATION: 98 % | DIASTOLIC BLOOD PRESSURE: 68 MMHG | HEIGHT: 55 IN | BODY MASS INDEX: 27.08 KG/M2 | HEART RATE: 92 BPM | SYSTOLIC BLOOD PRESSURE: 160 MMHG | RESPIRATION RATE: 12 BRPM

## 2024-09-24 DIAGNOSIS — J39.8 TRACHEAL MASS: ICD-10-CM

## 2024-09-24 DIAGNOSIS — G93.89 BRAIN MASS: ICD-10-CM

## 2024-09-24 DIAGNOSIS — S09.90XA INJURY OF HEAD, INITIAL ENCOUNTER: Primary | ICD-10-CM

## 2024-09-24 DIAGNOSIS — S01.81XA FACIAL LACERATION, INITIAL ENCOUNTER: ICD-10-CM

## 2024-09-24 PROCEDURE — 72125 CT NECK SPINE W/O DYE: CPT

## 2024-09-24 PROCEDURE — 99284 EMERGENCY DEPT VISIT MOD MDM: CPT

## 2024-09-24 PROCEDURE — 6370000000 HC RX 637 (ALT 250 FOR IP): Performed by: EMERGENCY MEDICINE

## 2024-09-24 PROCEDURE — 70450 CT HEAD/BRAIN W/O DYE: CPT

## 2024-09-24 PROCEDURE — 71045 X-RAY EXAM CHEST 1 VIEW: CPT

## 2024-09-24 PROCEDURE — 94640 AIRWAY INHALATION TREATMENT: CPT

## 2024-09-24 PROCEDURE — 12011 RPR F/E/E/N/L/M 2.5 CM/<: CPT

## 2024-09-24 RX ORDER — IPRATROPIUM BROMIDE AND ALBUTEROL SULFATE 2.5; .5 MG/3ML; MG/3ML
2 SOLUTION RESPIRATORY (INHALATION) ONCE
Status: COMPLETED | OUTPATIENT
Start: 2024-09-24 | End: 2024-09-24

## 2024-09-24 RX ADMIN — IPRATROPIUM BROMIDE AND ALBUTEROL SULFATE 2 DOSE: .5; 3 SOLUTION RESPIRATORY (INHALATION) at 10:41

## 2024-09-24 ASSESSMENT — PAIN - FUNCTIONAL ASSESSMENT: PAIN_FUNCTIONAL_ASSESSMENT: NONE - DENIES PAIN

## 2024-09-24 ASSESSMENT — ENCOUNTER SYMPTOMS
SORE THROAT: 0
BACK PAIN: 0
NAUSEA: 0
COUGH: 0
VOMITING: 0
DIARRHEA: 0
SHORTNESS OF BREATH: 0
WHEEZING: 0
CHEST TIGHTNESS: 0
ABDOMINAL PAIN: 0

## 2024-09-24 ASSESSMENT — LIFESTYLE VARIABLES
HOW MANY STANDARD DRINKS CONTAINING ALCOHOL DO YOU HAVE ON A TYPICAL DAY: PATIENT DOES NOT DRINK
HOW OFTEN DO YOU HAVE A DRINK CONTAINING ALCOHOL: NEVER

## 2024-11-20 DIAGNOSIS — R52 PAIN: ICD-10-CM

## 2024-11-20 DIAGNOSIS — R09.89 AIR HUNGER: ICD-10-CM

## 2024-11-20 DIAGNOSIS — Z51.5 HOSPICE CARE PATIENT: Primary | ICD-10-CM

## 2024-11-20 DIAGNOSIS — F41.9 ANXIETY: ICD-10-CM

## 2024-11-20 RX ORDER — HALOPERIDOL 1 MG/1
1 TABLET ORAL EVERY 6 HOURS PRN
Qty: 5 TABLET | Refills: 0 | Status: SHIPPED | OUTPATIENT
Start: 2024-11-20

## 2024-11-20 RX ORDER — OLANZAPINE 5 MG/1
5 TABLET, ORALLY DISINTEGRATING ORAL EVERY 12 HOURS PRN
Qty: 5 TABLET | Refills: 0 | Status: SHIPPED | OUTPATIENT
Start: 2024-11-20

## 2024-11-20 RX ORDER — LORAZEPAM 1 MG/1
1 TABLET ORAL EVERY 6 HOURS PRN
Qty: 5 TABLET | Refills: 0 | Status: SHIPPED | OUTPATIENT
Start: 2024-11-20 | End: 2024-12-20

## 2024-11-20 RX ORDER — OXYCODONE AND ACETAMINOPHEN 10; 325 MG/1; MG/1
1 TABLET ORAL EVERY 4 HOURS PRN
Qty: 90 TABLET | Refills: 0 | Status: SHIPPED | OUTPATIENT
Start: 2024-11-20 | End: 2024-12-20

## 2024-11-20 RX ORDER — GLYCOPYRROLATE 2 MG/1
2 TABLET ORAL 3 TIMES DAILY PRN
Qty: 5 TABLET | Refills: 0 | Status: SHIPPED | OUTPATIENT
Start: 2024-11-20

## 2024-11-20 RX ORDER — ACETAMINOPHEN 650 MG/1
650 SUPPOSITORY RECTAL EVERY 4 HOURS PRN
Qty: 3 SUPPOSITORY | Refills: 0 | Status: SHIPPED | OUTPATIENT
Start: 2024-11-20

## 2024-11-20 RX ORDER — MORPHINE SULFATE 100 MG/5ML
5 SOLUTION ORAL EVERY 6 HOURS PRN
Qty: 30 ML | Refills: 0 | Status: SHIPPED | OUTPATIENT
Start: 2024-11-20 | End: 2024-12-20

## 2024-11-20 NOTE — TELEPHONE ENCOUNTER
Patient is admitted to hospice care.  Hospice comfort care kit was sent to hospice pharmacy to place in the home setting.

## 2024-11-26 DIAGNOSIS — F41.9 ANXIETY: ICD-10-CM

## 2024-11-26 DIAGNOSIS — Z51.5 HOSPICE CARE PATIENT: ICD-10-CM

## 2024-11-26 RX ORDER — LORAZEPAM 1 MG/1
TABLET ORAL
Qty: 60 TABLET | Refills: 3 | Status: SHIPPED | OUTPATIENT
Start: 2024-11-26 | End: 2024-12-26